# Patient Record
Sex: MALE | Race: BLACK OR AFRICAN AMERICAN | Employment: UNEMPLOYED | URBAN - METROPOLITAN AREA
[De-identification: names, ages, dates, MRNs, and addresses within clinical notes are randomized per-mention and may not be internally consistent; named-entity substitution may affect disease eponyms.]

---

## 2018-01-26 ENCOUNTER — HOSPITAL ENCOUNTER (EMERGENCY)
Age: 38
Discharge: LEFT BEFORE DISCHARGE | End: 2018-01-27
Attending: EMERGENCY MEDICINE
Payer: MEDICAID

## 2018-01-26 VITALS
HEIGHT: 70 IN | SYSTOLIC BLOOD PRESSURE: 180 MMHG | OXYGEN SATURATION: 99 % | HEART RATE: 105 BPM | BODY MASS INDEX: 36.08 KG/M2 | WEIGHT: 252 LBS | DIASTOLIC BLOOD PRESSURE: 120 MMHG | RESPIRATION RATE: 18 BRPM | TEMPERATURE: 98.5 F

## 2018-01-26 PROCEDURE — 75810000275 HC EMERGENCY DEPT VISIT NO LEVEL OF CARE

## 2018-01-27 NOTE — ED NOTES
Patient left without being seen after triage. He alerted the Registrar and left prior to the Charge Nurse being able to speak to him.

## 2018-01-27 NOTE — ED TRIAGE NOTES
Triage: Patient arrives ambulatory from home with c/o headache and neck pain x several weeks. Patient also reporting uncontrolled hypertension for several weeks.

## 2018-11-26 ENCOUNTER — HOSPITAL ENCOUNTER (EMERGENCY)
Age: 38
Discharge: STILL ADMITTED OR ADMINISTRATIVE | End: 2018-11-26
Attending: EMERGENCY MEDICINE
Payer: MEDICAID

## 2018-11-26 PROCEDURE — 75810000275 HC EMERGENCY DEPT VISIT NO LEVEL OF CARE

## 2021-01-02 ENCOUNTER — HOSPITAL ENCOUNTER (OUTPATIENT)
Age: 41
Setting detail: OBSERVATION
Discharge: LEFT BEFORE DISCHARGE | End: 2021-01-02
Attending: STUDENT IN AN ORGANIZED HEALTH CARE EDUCATION/TRAINING PROGRAM | Admitting: INTERNAL MEDICINE
Payer: MEDICAID

## 2021-01-02 ENCOUNTER — APPOINTMENT (OUTPATIENT)
Dept: GENERAL RADIOLOGY | Age: 41
End: 2021-01-02
Attending: STUDENT IN AN ORGANIZED HEALTH CARE EDUCATION/TRAINING PROGRAM
Payer: MEDICAID

## 2021-01-02 VITALS
BODY MASS INDEX: 44.5 KG/M2 | OXYGEN SATURATION: 90 % | TEMPERATURE: 97.9 F | WEIGHT: 310.85 LBS | RESPIRATION RATE: 24 BRPM | DIASTOLIC BLOOD PRESSURE: 110 MMHG | SYSTOLIC BLOOD PRESSURE: 153 MMHG | HEART RATE: 89 BPM | HEIGHT: 70 IN

## 2021-01-02 DIAGNOSIS — I16.0 HYPERTENSIVE URGENCY: ICD-10-CM

## 2021-01-02 DIAGNOSIS — I50.23 ACUTE ON CHRONIC SYSTOLIC CONGESTIVE HEART FAILURE (HCC): ICD-10-CM

## 2021-01-02 DIAGNOSIS — R07.9 ACUTE CHEST PAIN: Primary | ICD-10-CM

## 2021-01-02 DIAGNOSIS — R77.8 ELEVATED TROPONIN: ICD-10-CM

## 2021-01-02 DIAGNOSIS — R06.09 EXERTIONAL DYSPNEA: ICD-10-CM

## 2021-01-02 LAB
ALBUMIN SERPL-MCNC: 2.8 G/DL (ref 3.5–5)
ALBUMIN/GLOB SERPL: 0.6 {RATIO} (ref 1.1–2.2)
ALP SERPL-CCNC: 50 U/L (ref 45–117)
ALT SERPL-CCNC: 23 U/L (ref 12–78)
ANION GAP SERPL CALC-SCNC: 1 MMOL/L (ref 5–15)
AST SERPL-CCNC: 24 U/L (ref 15–37)
BASOPHILS # BLD: 0.1 K/UL (ref 0–0.1)
BASOPHILS NFR BLD: 0 % (ref 0–1)
BILIRUB SERPL-MCNC: 0.5 MG/DL (ref 0.2–1)
BNP SERPL-MCNC: 2855 PG/ML
BUN SERPL-MCNC: 12 MG/DL (ref 6–20)
BUN/CREAT SERPL: 10 (ref 12–20)
CALCIUM SERPL-MCNC: 8.7 MG/DL (ref 8.5–10.1)
CHLORIDE SERPL-SCNC: 106 MMOL/L (ref 97–108)
CO2 SERPL-SCNC: 32 MMOL/L (ref 21–32)
COMMENT, HOLDF: NORMAL
CREAT SERPL-MCNC: 1.16 MG/DL (ref 0.7–1.3)
DIFFERENTIAL METHOD BLD: ABNORMAL
EOSINOPHIL # BLD: 0.1 K/UL (ref 0–0.4)
EOSINOPHIL NFR BLD: 1 % (ref 0–7)
ERYTHROCYTE [DISTWIDTH] IN BLOOD BY AUTOMATED COUNT: 14.5 % (ref 11.5–14.5)
GLOBULIN SER CALC-MCNC: 4.7 G/DL (ref 2–4)
GLUCOSE SERPL-MCNC: 143 MG/DL (ref 65–100)
HCT VFR BLD AUTO: 41.8 % (ref 36.6–50.3)
HGB BLD-MCNC: 12.6 G/DL (ref 12.1–17)
IMM GRANULOCYTES # BLD AUTO: 0 K/UL (ref 0–0.04)
IMM GRANULOCYTES NFR BLD AUTO: 0 % (ref 0–0.5)
LYMPHOCYTES # BLD: 2.7 K/UL (ref 0.8–3.5)
LYMPHOCYTES NFR BLD: 23 % (ref 12–49)
MCH RBC QN AUTO: 27.9 PG (ref 26–34)
MCHC RBC AUTO-ENTMCNC: 30.1 G/DL (ref 30–36.5)
MCV RBC AUTO: 92.5 FL (ref 80–99)
MONOCYTES # BLD: 0.9 K/UL (ref 0–1)
MONOCYTES NFR BLD: 8 % (ref 5–13)
NEUTS SEG # BLD: 7.9 K/UL (ref 1.8–8)
NEUTS SEG NFR BLD: 68 % (ref 32–75)
NRBC # BLD: 0 K/UL (ref 0–0.01)
NRBC BLD-RTO: 0 PER 100 WBC
PLATELET # BLD AUTO: 313 K/UL (ref 150–400)
PMV BLD AUTO: 10.2 FL (ref 8.9–12.9)
POTASSIUM SERPL-SCNC: 4.2 MMOL/L (ref 3.5–5.1)
PROT SERPL-MCNC: 7.5 G/DL (ref 6.4–8.2)
RBC # BLD AUTO: 4.52 M/UL (ref 4.1–5.7)
SAMPLES BEING HELD,HOLD: NORMAL
SODIUM SERPL-SCNC: 139 MMOL/L (ref 136–145)
TROPONIN I SERPL-MCNC: 0.11 NG/ML
WBC # BLD AUTO: 11.7 K/UL (ref 4.1–11.1)

## 2021-01-02 PROCEDURE — 77030012341 HC CHMB SPCR OPTC MDI VYRM -A

## 2021-01-02 PROCEDURE — 94664 DEMO&/EVAL PT USE INHALER: CPT

## 2021-01-02 PROCEDURE — 80053 COMPREHEN METABOLIC PANEL: CPT

## 2021-01-02 PROCEDURE — 85025 COMPLETE CBC W/AUTO DIFF WBC: CPT

## 2021-01-02 PROCEDURE — 65660000000 HC RM CCU STEPDOWN

## 2021-01-02 PROCEDURE — 93005 ELECTROCARDIOGRAM TRACING: CPT

## 2021-01-02 PROCEDURE — 99218 HC RM OBSERVATION: CPT

## 2021-01-02 PROCEDURE — 96374 THER/PROPH/DIAG INJ IV PUSH: CPT

## 2021-01-02 PROCEDURE — 94640 AIRWAY INHALATION TREATMENT: CPT

## 2021-01-02 PROCEDURE — 99285 EMERGENCY DEPT VISIT HI MDM: CPT

## 2021-01-02 PROCEDURE — 84484 ASSAY OF TROPONIN QUANT: CPT

## 2021-01-02 PROCEDURE — 71046 X-RAY EXAM CHEST 2 VIEWS: CPT

## 2021-01-02 PROCEDURE — 74011250636 HC RX REV CODE- 250/636: Performed by: STUDENT IN AN ORGANIZED HEALTH CARE EDUCATION/TRAINING PROGRAM

## 2021-01-02 PROCEDURE — 83880 ASSAY OF NATRIURETIC PEPTIDE: CPT

## 2021-01-02 PROCEDURE — 74011250637 HC RX REV CODE- 250/637: Performed by: STUDENT IN AN ORGANIZED HEALTH CARE EDUCATION/TRAINING PROGRAM

## 2021-01-02 PROCEDURE — 36415 COLL VENOUS BLD VENIPUNCTURE: CPT

## 2021-01-02 PROCEDURE — 96375 TX/PRO/DX INJ NEW DRUG ADDON: CPT

## 2021-01-02 RX ORDER — SODIUM CHLORIDE 0.9 % (FLUSH) 0.9 %
5-40 SYRINGE (ML) INJECTION AS NEEDED
Status: DISCONTINUED | OUTPATIENT
Start: 2021-01-02 | End: 2021-01-02 | Stop reason: HOSPADM

## 2021-01-02 RX ORDER — GUAIFENESIN 100 MG/5ML
324 LIQUID (ML) ORAL
Status: COMPLETED | OUTPATIENT
Start: 2021-01-02 | End: 2021-01-02

## 2021-01-02 RX ORDER — FUROSEMIDE 10 MG/ML
40 INJECTION INTRAMUSCULAR; INTRAVENOUS ONCE
Status: COMPLETED | OUTPATIENT
Start: 2021-01-02 | End: 2021-01-02

## 2021-01-02 RX ORDER — METOLAZONE 5 MG/1
5 TABLET ORAL ONCE
Status: DISCONTINUED | OUTPATIENT
Start: 2021-01-02 | End: 2021-01-02 | Stop reason: HOSPADM

## 2021-01-02 RX ORDER — SPIRONOLACTONE 25 MG/1
25 TABLET ORAL DAILY
Status: DISCONTINUED | OUTPATIENT
Start: 2021-01-03 | End: 2021-01-02 | Stop reason: HOSPADM

## 2021-01-02 RX ORDER — SODIUM CHLORIDE 0.9 % (FLUSH) 0.9 %
5-40 SYRINGE (ML) INJECTION EVERY 8 HOURS
Status: DISCONTINUED | OUTPATIENT
Start: 2021-01-02 | End: 2021-01-02 | Stop reason: HOSPADM

## 2021-01-02 RX ORDER — CARVEDILOL 12.5 MG/1
12.5 TABLET ORAL 2 TIMES DAILY WITH MEALS
Status: DISCONTINUED | OUTPATIENT
Start: 2021-01-02 | End: 2021-01-02 | Stop reason: HOSPADM

## 2021-01-02 RX ORDER — PROMETHAZINE HYDROCHLORIDE 25 MG/1
12.5 TABLET ORAL
Status: DISCONTINUED | OUTPATIENT
Start: 2021-01-02 | End: 2021-01-02 | Stop reason: HOSPADM

## 2021-01-02 RX ORDER — LISINOPRIL 10 MG/1
20 TABLET ORAL DAILY
Status: DISCONTINUED | OUTPATIENT
Start: 2021-01-02 | End: 2021-01-02 | Stop reason: HOSPADM

## 2021-01-02 RX ORDER — ALBUTEROL SULFATE 90 UG/1
4 AEROSOL, METERED RESPIRATORY (INHALATION)
Status: COMPLETED | OUTPATIENT
Start: 2021-01-02 | End: 2021-01-02

## 2021-01-02 RX ORDER — ACETAMINOPHEN 650 MG/1
650 SUPPOSITORY RECTAL
Status: DISCONTINUED | OUTPATIENT
Start: 2021-01-02 | End: 2021-01-02 | Stop reason: HOSPADM

## 2021-01-02 RX ORDER — POLYETHYLENE GLYCOL 3350 17 G/17G
17 POWDER, FOR SOLUTION ORAL DAILY PRN
Status: DISCONTINUED | OUTPATIENT
Start: 2021-01-02 | End: 2021-01-02 | Stop reason: HOSPADM

## 2021-01-02 RX ORDER — HYDRALAZINE HYDROCHLORIDE 20 MG/ML
10 INJECTION INTRAMUSCULAR; INTRAVENOUS ONCE
Status: COMPLETED | OUTPATIENT
Start: 2021-01-02 | End: 2021-01-02

## 2021-01-02 RX ORDER — BUMETANIDE 0.25 MG/ML
2 INJECTION INTRAMUSCULAR; INTRAVENOUS EVERY 12 HOURS
Status: DISCONTINUED | OUTPATIENT
Start: 2021-01-02 | End: 2021-01-02 | Stop reason: HOSPADM

## 2021-01-02 RX ORDER — ONDANSETRON 2 MG/ML
4 INJECTION INTRAMUSCULAR; INTRAVENOUS
Status: DISCONTINUED | OUTPATIENT
Start: 2021-01-02 | End: 2021-01-02 | Stop reason: HOSPADM

## 2021-01-02 RX ORDER — ACETAMINOPHEN 325 MG/1
650 TABLET ORAL
Status: DISCONTINUED | OUTPATIENT
Start: 2021-01-02 | End: 2021-01-02 | Stop reason: HOSPADM

## 2021-01-02 RX ADMIN — ALBUTEROL SULFATE 4 PUFF: 108 AEROSOL, METERED RESPIRATORY (INHALATION) at 17:05

## 2021-01-02 RX ADMIN — FUROSEMIDE 40 MG: 10 INJECTION, SOLUTION INTRAMUSCULAR; INTRAVENOUS at 18:11

## 2021-01-02 RX ADMIN — HYDRALAZINE HYDROCHLORIDE 10 MG: 20 INJECTION INTRAMUSCULAR; INTRAVENOUS at 18:11

## 2021-01-02 RX ADMIN — ASPIRIN 324 MG: 81 TABLET, CHEWABLE ORAL at 17:04

## 2021-01-02 NOTE — CONSULTS
CARDIOLOGY CONSULT                 Assessment:     Assessment:       Active Problems:    Chest pain (1/2/2021)         Plan:    1. Elevated troponin  Will trend  prob related to htn  2.  htn  severe  3. Cardiomyopathy  Non ischemic dilated   4. CHF acute on chronic systolic btnp 1725     Home meds:   torsemide 80 bid,  Coreg 12.5 bid, lisinopril 20 mg daily,   Aldactone 25 mg every day, and asthma meds  Resume with IV bumex 2 bid  5. Asthma with exacerbation        Subjective:    Date of  Admission: 1/2/2021  4:31 PM     Admission type:Emergency    Jose Singh is a 41 y.o. male admitted for Chest pain [R07.9]. Pt merrill 3 yr hx of DCM non ischemic managed at [facility]. He has not been taking  His meds and over the last 2 weeks he has been having progressive dyspnea so now dyspneic at rest with + pnd.   Pt has been havign discussions regarding ICD for primary prevention. No hx of syncope or pal[pirtaions. No fevers.         Patient Active Problem List    Diagnosis Date Noted    Chest pain 01/02/2021      Unknown, Provider  Past Medical History:   Diagnosis Date    Asthma     Gastrointestinal disorder     ulcers    Hypertension       Past Surgical History:   Procedure Laterality Date    HX ORTHOPAEDIC      R hand repair     No Known Allergies   Family History   Problem Relation Age of Onset    Hypertension Mother       Current Facility-Administered Medications   Medication Dose Route Frequency    furosemide (LASIX) injection 40 mg  40 mg IntraVENous ONCE    hydrALAZINE (APRESOLINE) 20 mg/mL injection 10 mg  10 mg IntraVENous ONCE    sodium chloride (NS) flush 5-40 mL  5-40 mL IntraVENous Q8H    sodium chloride (NS) flush 5-40 mL  5-40 mL IntraVENous PRN    acetaminophen (TYLENOL) tablet 650 mg  650 mg Oral Q6H PRN    Or    acetaminophen (TYLENOL) suppository 650 mg  650 mg Rectal Q6H PRN    polyethylene glycol (MIRALAX) packet 17 g  17 g Oral DAILY PRN    promethazine (PHENERGAN) tablet 12.5 mg 12.5 mg Oral Q6H PRN    Or    ondansetron (ZOFRAN) injection 4 mg  4 mg IntraVENous Q6H PRN     Current Outpatient Medications   Medication Sig    albuterol (PROVENTIL HFA, VENTOLIN HFA, PROAIR HFA) 90 mcg/actuation inhaler Take 2 Puffs by inhalation every six (6) hours as needed for Wheezing.  dextromethorphan-guaiFENesin (ROBITUSSIN-DM)  mg/5 mL syrup Take 10 mL by mouth every six (6) hours as needed for Cough. Review of Symptoms:  A comprehensive review of systems was negative. No hemoptysis, hematemesis, epistaxis, melena, hematuria. No fevers,  Rashes, seizures, visual disturbances, difficulty walking, no abdominal pain         Physical Exam    Visit Vitals  BP (!) 161/116 (BP 1 Location: Left arm, BP Patient Position: Sitting)   Pulse 91   Temp 97.9 °F (36.6 °C)   Resp 24   Ht 5' 10\" (1.778 m)   Wt 141 kg (310 lb 13.6 oz)   SpO2 98%   BMI 44.60 kg/m²     Skin warm and dry  PERRLA, EOMI  Oropharynx without exudate. Mallampati 2  Neck supple, thyroid not enlarged  Lungs diffuse ronchi   PMI non displaced. Normal S1/ S2   No Mummurs, click or Rubs  + S3 or S4  Abdomen soft and non tender, No Hepatosplenomegaly  Pulses 2+ throughout,    Neuro:   normal facial grimace,  Moves all extremities.    AAAO  unanxious      Cardiographics    Telemetry:nsr   ECG: normal sinus rhythm, nonspecific ST and T waves changes  Echocardiogram: Not done    Labs:   Recent Results (from the past 24 hour(s))   EKG, 12 LEAD, INITIAL    Collection Time: 01/02/21  3:32 PM   Result Value Ref Range    Ventricular Rate 85 BPM    Atrial Rate 85 BPM    P-R Interval 180 ms    QRS Duration 132 ms    Q-T Interval 410 ms    QTC Calculation (Bezet) 487 ms    Calculated P Axis 49 degrees    Calculated R Axis -59 degrees    Calculated T Axis 123 degrees    Diagnosis       Normal sinus rhythm  Left axis deviation  Left ventricular hypertrophy with QRS widening and repolarization abnormality  No previous ECGs available     SAMPLES BEING HELD    Collection Time: 01/02/21  3:36 PM   Result Value Ref Range    SAMPLES BEING HELD 1RED,1BLU     COMMENT        Add-on orders for these samples will be processed based on acceptable specimen integrity and analyte stability, which may vary by analyte. CBC WITH AUTOMATED DIFF    Collection Time: 01/02/21  3:36 PM   Result Value Ref Range    WBC 11.7 (H) 4.1 - 11.1 K/uL    RBC 4.52 4.10 - 5.70 M/uL    HGB 12.6 12.1 - 17.0 g/dL    HCT 41.8 36.6 - 50.3 %    MCV 92.5 80.0 - 99.0 FL    MCH 27.9 26.0 - 34.0 PG    MCHC 30.1 30.0 - 36.5 g/dL    RDW 14.5 11.5 - 14.5 %    PLATELET 963 535 - 239 K/uL    MPV 10.2 8.9 - 12.9 FL    NRBC 0.0 0  WBC    ABSOLUTE NRBC 0.00 0.00 - 0.01 K/uL    NEUTROPHILS 68 32 - 75 %    LYMPHOCYTES 23 12 - 49 %    MONOCYTES 8 5 - 13 %    EOSINOPHILS 1 0 - 7 %    BASOPHILS 0 0 - 1 %    IMMATURE GRANULOCYTES 0 0.0 - 0.5 %    ABS. NEUTROPHILS 7.9 1.8 - 8.0 K/UL    ABS. LYMPHOCYTES 2.7 0.8 - 3.5 K/UL    ABS. MONOCYTES 0.9 0.0 - 1.0 K/UL    ABS. EOSINOPHILS 0.1 0.0 - 0.4 K/UL    ABS. BASOPHILS 0.1 0.0 - 0.1 K/UL    ABS. IMM. GRANS. 0.0 0.00 - 0.04 K/UL    DF AUTOMATED     METABOLIC PANEL, COMPREHENSIVE    Collection Time: 01/02/21  3:36 PM   Result Value Ref Range    Sodium 139 136 - 145 mmol/L    Potassium 4.2 3.5 - 5.1 mmol/L    Chloride 106 97 - 108 mmol/L    CO2 32 21 - 32 mmol/L    Anion gap 1 (L) 5 - 15 mmol/L    Glucose 143 (H) 65 - 100 mg/dL    BUN 12 6 - 20 MG/DL    Creatinine 1.16 0.70 - 1.30 MG/DL    BUN/Creatinine ratio 10 (L) 12 - 20      GFR est AA >60 >60 ml/min/1.73m2    GFR est non-AA >60 >60 ml/min/1.73m2    Calcium 8.7 8.5 - 10.1 MG/DL    Bilirubin, total 0.5 0.2 - 1.0 MG/DL    ALT (SGPT) 23 12 - 78 U/L    AST (SGOT) 24 15 - 37 U/L    Alk.  phosphatase 50 45 - 117 U/L    Protein, total 7.5 6.4 - 8.2 g/dL    Albumin 2.8 (L) 3.5 - 5.0 g/dL    Globulin 4.7 (H) 2.0 - 4.0 g/dL    A-G Ratio 0.6 (L) 1.1 - 2.2     TROPONIN I    Collection Time: 01/02/21  3:36 PM   Result Value Ref Range    Troponin-I, Qt. 0.11 (H) <0.05 ng/mL   NT-PRO BNP    Collection Time: 01/02/21  3:36 PM   Result Value Ref Range    NT pro-BNP 2,855 (H) <125 PG/ML

## 2021-01-02 NOTE — ED NOTES
Triage Note: Patient is coming in with shortness of breath for the past couple of weeks. Patient is also having chest pain. Patient states has been running low on medicine and is having issue getting medicine from pharmacy. Patient's cardiologist is at [facility].

## 2021-01-02 NOTE — Clinical Note
Status[de-identified] INPATIENT [101]   Type of Bed: Telemetry [19]   Inpatient Hospitalization Certified Necessary for the Following Reasons: 3.  Patient receiving treatment that can only be provided in an inpatient setting (further clarification in H&P documentation)   Admitting Diagnosis: Chest pain [[id number]]   Admitting Physician: Abigail Velasquez   Attending Physician: Abigail Velasquez   Estimated Length of Stay: 3-4 Midnights   Discharge Plan[de-identified] Home with Office Follow-up

## 2021-01-02 NOTE — ED PROVIDER NOTES
Chief Complaint   Patient presents with    Chest Pain    Shortness of Breath     This is a 68-year-old male with hypertension, congestive heart failure (last ejection fracture unknown as no echocardiogram results are available in our EMR), asthma, presenting with right-sided chest pain and exertional dyspnea over the last week and a half. He also reports orthopnea and has had a 20 pound weight gain in the last several months. He ran out of his medications sometime last week and asked his pharmacist to call his cardiologist to provide him with a refill but has not yet heard back. He receives most of his cardiac care through [facility]. Denies any fevers, cough, abdominal pain, vomiting, or recent respiratory illness. No recent sick contacts that have been positive for or under investigation for COVID-19 infection. Has chronic lower extremity edema which he does not feel is acutely worse today. No other systemic complaints.         Past Medical History:   Diagnosis Date    Asthma     Gastrointestinal disorder     ulcers    Hypertension        Past Surgical History:   Procedure Laterality Date    HX ORTHOPAEDIC      R hand repair         Family History:   Problem Relation Age of Onset    Hypertension Mother        Social History     Socioeconomic History    Marital status: SINGLE     Spouse name: Not on file    Number of children: Not on file    Years of education: Not on file    Highest education level: Not on file   Occupational History    Not on file   Social Needs    Financial resource strain: Not on file    Food insecurity     Worry: Not on file     Inability: Not on file    Transportation needs     Medical: Not on file     Non-medical: Not on file   Tobacco Use    Smoking status: Current Every Day Smoker     Packs/day: 0.25   Substance and Sexual Activity    Alcohol use: No    Drug use: No    Sexual activity: Not on file   Lifestyle    Physical activity     Days per week: Not on file Minutes per session: Not on file    Stress: Not on file   Relationships    Social connections     Talks on phone: Not on file     Gets together: Not on file     Attends Taoist service: Not on file     Active member of club or organization: Not on file     Attends meetings of clubs or organizations: Not on file     Relationship status: Not on file    Intimate partner violence     Fear of current or ex partner: Not on file     Emotionally abused: Not on file     Physically abused: Not on file     Forced sexual activity: Not on file   Other Topics Concern    Not on file   Social History Narrative    Not on file         ALLERGIES: Patient has no known allergies. Review of Systems   Constitutional: Negative for fever. HENT: Negative for congestion. Eyes: Negative for redness. Respiratory: Positive for shortness of breath. Cardiovascular: Positive for chest pain. Gastrointestinal: Negative for abdominal pain. Genitourinary: Negative for difficulty urinating. Musculoskeletal: Negative for back pain. Neurological: Negative for headaches. Psychiatric/Behavioral: Negative for confusion.        Vitals:    01/02/21 1518 01/02/21 1706   BP: (!) 161/116    Pulse: 91    Resp: 24    Temp: 97.9 °F (36.6 °C)    SpO2: 95% 98%   Weight: 141 kg (310 lb 13.6 oz)    Height: 5' 10\" (1.778 m)             Physical Exam  General:  Awake and alert, NAD  HEENT:  NC/AT, equal pupils, moist mucous membranes  Neck:   Normal inspection, full range of motion  Cardiac:  RRR, no murmurs  Respiratory:  +Coarse breath sounds throughout, no wheezing, normal effort  Abdomen:  Soft and nontender, nondistended  Extremities: Warm and well perfused, mild to moderate pitting edema in the lower extremities, symmetric  Neuro:  Moving all extremities symmetrically without gross motor deficit  Skin:   No rashes or pallor    RESULTS  Recent Results (from the past 12 hour(s))   EKG, 12 LEAD, INITIAL    Collection Time: 01/02/21  3:32 PM Result Value Ref Range    Ventricular Rate 85 BPM    Atrial Rate 85 BPM    P-R Interval 180 ms    QRS Duration 132 ms    Q-T Interval 410 ms    QTC Calculation (Bezet) 487 ms    Calculated P Axis 49 degrees    Calculated R Axis -59 degrees    Calculated T Axis 123 degrees    Diagnosis       Normal sinus rhythm  Left axis deviation  Left ventricular hypertrophy with QRS widening and repolarization abnormality  No previous ECGs available     SAMPLES BEING HELD    Collection Time: 01/02/21  3:36 PM   Result Value Ref Range    SAMPLES BEING HELD 1RED,1BLU     COMMENT        Add-on orders for these samples will be processed based on acceptable specimen integrity and analyte stability, which may vary by analyte. CBC WITH AUTOMATED DIFF    Collection Time: 01/02/21  3:36 PM   Result Value Ref Range    WBC 11.7 (H) 4.1 - 11.1 K/uL    RBC 4.52 4.10 - 5.70 M/uL    HGB 12.6 12.1 - 17.0 g/dL    HCT 41.8 36.6 - 50.3 %    MCV 92.5 80.0 - 99.0 FL    MCH 27.9 26.0 - 34.0 PG    MCHC 30.1 30.0 - 36.5 g/dL    RDW 14.5 11.5 - 14.5 %    PLATELET 625 806 - 695 K/uL    MPV 10.2 8.9 - 12.9 FL    NRBC 0.0 0  WBC    ABSOLUTE NRBC 0.00 0.00 - 0.01 K/uL    NEUTROPHILS 68 32 - 75 %    LYMPHOCYTES 23 12 - 49 %    MONOCYTES 8 5 - 13 %    EOSINOPHILS 1 0 - 7 %    BASOPHILS 0 0 - 1 %    IMMATURE GRANULOCYTES 0 0.0 - 0.5 %    ABS. NEUTROPHILS 7.9 1.8 - 8.0 K/UL    ABS. LYMPHOCYTES 2.7 0.8 - 3.5 K/UL    ABS. MONOCYTES 0.9 0.0 - 1.0 K/UL    ABS. EOSINOPHILS 0.1 0.0 - 0.4 K/UL    ABS. BASOPHILS 0.1 0.0 - 0.1 K/UL    ABS. IMM.  GRANS. 0.0 0.00 - 0.04 K/UL    DF AUTOMATED     METABOLIC PANEL, COMPREHENSIVE    Collection Time: 01/02/21  3:36 PM   Result Value Ref Range    Sodium 139 136 - 145 mmol/L    Potassium 4.2 3.5 - 5.1 mmol/L    Chloride 106 97 - 108 mmol/L    CO2 32 21 - 32 mmol/L    Anion gap 1 (L) 5 - 15 mmol/L    Glucose 143 (H) 65 - 100 mg/dL    BUN 12 6 - 20 MG/DL    Creatinine 1.16 0.70 - 1.30 MG/DL    BUN/Creatinine ratio 10 (L) 12 - 20      GFR est AA >60 >60 ml/min/1.73m2    GFR est non-AA >60 >60 ml/min/1.73m2    Calcium 8.7 8.5 - 10.1 MG/DL    Bilirubin, total 0.5 0.2 - 1.0 MG/DL    ALT (SGPT) 23 12 - 78 U/L    AST (SGOT) 24 15 - 37 U/L    Alk. phosphatase 50 45 - 117 U/L    Protein, total 7.5 6.4 - 8.2 g/dL    Albumin 2.8 (L) 3.5 - 5.0 g/dL    Globulin 4.7 (H) 2.0 - 4.0 g/dL    A-G Ratio 0.6 (L) 1.1 - 2.2     TROPONIN I    Collection Time: 01/02/21  3:36 PM   Result Value Ref Range    Troponin-I, Qt. 0.11 (H) <0.05 ng/mL   NT-PRO BNP    Collection Time: 01/02/21  3:36 PM   Result Value Ref Range    NT pro-BNP 2,855 (H) <125 PG/ML        IMAGING  Xr Chest Pa Lat    Result Date: 1/2/2021  IMPRESSION: There is moderately severe cardiomegaly and a pericardial effusion should be excluded. The lungs are clear of a focal airspace process. Procedures - none unless documented below  EKG as interpreted by me:  Normal sinus rhythm at a rate of 85, left axis deviation, nonspecific intraventricular conduction delay with ST elevations in leads V3 and V4 although they do not appear to be of ischemic morphology. Left ventricular hypertrophy by aVL criteria. There are new T wave inversions in leads V5V6, 1 and aVL. These changes are new compared to his last EKG from 2014. ED course: Labs, EKG and imaging reviewed. Hx CHF unknown EF, ran out of his medications 1-2 weeks ago. Hypertensive with exertional dyspnea, orthopnea, troponin 0.11 without basis for comparison and elevated BNP in the 2000's. Chest film clear. EKG abnormal but no STEMI. Discussed with cardiology Chance Mayorga) who agrees this is likely CHF driven, he agreed to evaluate at the bedside. I've ordered aspirin, IV hydralazine and Lasix 40 mg, he was given albuterol as well, he does not known what he takes for his blood pressure. When I reexamined the patient, he was very unhappy that he did not receive an albuterol neb treatment.   I informed him that this was not safe in the setting of the current COVID pandemic especially without the results of a COVID-19 test, and offered to give him additional albuterol MDI. He declined the test itself, felt that he was waiting too long to receive medical care, did not want to wait any further, and wished to seek care at a different hospitalist.  He was evaluated by the hospitalist and decided to leave AMA. The patient wishes to leave against medical advice as he does not wish to be hospitalized, understanding that this is necessary for further evaluation and treatment, given the concern for potential life-threatening illness. At this time the patient is alert and oriented with full decision making capacity, verbalized understanding of risks of leaving AMA including heart attack, respiratory failure, as well as other undiagnosed medical pathology that could result in severe disability or death. He refused to sign a copy of the AMA form. His IV was removed by nursing and he left shortly thereafter. Hospitalist King Serve for Admission  5:29 PM    ED Room Number:   ER16/16  Patient Name and age:  Alysa George 41 y.o.  male  Working Diagnosis:     1. Acute chest pain    2. Exertional dyspnea    3. Acute on chronic systolic congestive heart failure (HCC)    4. Elevated troponin    5. Hypertensive urgency      COVID suspicion:   Other(low suspicion but will defer to you on testing)  Code Status:    Full Code  Readmission:    no  Isolation Requirements:  Other  Recommended Level of Care: telemetry  Department:    [facility] ED - 21   Other:     Hx CHF unknown EF, ran out of his medications 1-2 weeks ago. Hypertensive with exertional dyspnea, orthopnea, troponin 0.11 without basis for comparison and elevated BNP in the 2000's. Chest film clear. EKG abnormal but no STEMI. Discussed with cardiology Adriana Tay) who agrees this is likely CHF driven, he will evaluate at the bedside.   I've ordered aspirin, IV hydralazine and Lasix 40 mg, he does not known what he takes for his blood pressure. no indicators present

## 2021-01-02 NOTE — ED NOTES
Patient signed out [place] after being seen by the Hospitalist and Cardiologist. Patient signed out AMA by Dr. Dolores Rodríguez

## 2021-01-03 LAB
ATRIAL RATE: 85 BPM
CALCULATED P AXIS, ECG09: 49 DEGREES
CALCULATED R AXIS, ECG10: -59 DEGREES
CALCULATED T AXIS, ECG11: 123 DEGREES
DIAGNOSIS, 93000: NORMAL
P-R INTERVAL, ECG05: 180 MS
Q-T INTERVAL, ECG07: 410 MS
QRS DURATION, ECG06: 132 MS
QTC CALCULATION (BEZET), ECG08: 487 MS
VENTRICULAR RATE, ECG03: 85 BPM

## 2021-01-03 NOTE — PROGRESS NOTES
Hospitalist consulted for admission. During brief encounter, patient elected to leave AMA. No history or physical exam completed. Signed out AMA with emergency room provider.

## 2021-01-20 NOTE — ADT AUTH CERT NOTES
PREVIOUSLY DENIED FOR INPATIENT DOWNGRADED TO OBSERVATION REF #[id number]    
  
PLEASE FAX FORM OR CALL BACK TO NOTIFY IF  AUTHORIZATION FOR OBSERVATION IS OR IS NOT REQUIRED  PHONE # [phone]  FAX # [phone]

## 2022-03-20 PROBLEM — R07.9 CHEST PAIN: Status: ACTIVE | Noted: 2021-01-02

## 2022-06-28 ENCOUNTER — HOSPITAL ENCOUNTER (EMERGENCY)
Age: 42
Discharge: HOME | End: 2022-06-28
Attending: STUDENT IN AN ORGANIZED HEALTH CARE EDUCATION/TRAINING PROGRAM
Payer: MEDICAID

## 2022-06-28 ENCOUNTER — APPOINTMENT (OUTPATIENT)
Dept: ULTRASOUND IMAGING | Age: 42
End: 2022-06-28
Attending: STUDENT IN AN ORGANIZED HEALTH CARE EDUCATION/TRAINING PROGRAM
Payer: MEDICAID

## 2022-06-28 VITALS
SYSTOLIC BLOOD PRESSURE: 156 MMHG | WEIGHT: 315 LBS | TEMPERATURE: 97.9 F | RESPIRATION RATE: 20 BRPM | HEART RATE: 86 BPM | DIASTOLIC BLOOD PRESSURE: 95 MMHG | OXYGEN SATURATION: 91 % | BODY MASS INDEX: 49.76 KG/M2

## 2022-06-28 DIAGNOSIS — N43.3 HYDROCELE IN ADULT: Primary | ICD-10-CM

## 2022-06-28 LAB
ALBUMIN SERPL-MCNC: 2.8 G/DL (ref 3.5–5)
ALBUMIN/GLOB SERPL: 0.5 {RATIO} (ref 1.1–2.2)
ALP SERPL-CCNC: 56 U/L (ref 45–117)
ALT SERPL-CCNC: 15 U/L (ref 12–78)
ANION GAP SERPL CALC-SCNC: 3 MMOL/L (ref 5–15)
AST SERPL-CCNC: 12 U/L (ref 15–37)
ATRIAL RATE: 90 BPM
BASOPHILS # BLD: 0 K/UL (ref 0–0.1)
BASOPHILS NFR BLD: 0 % (ref 0–1)
BILIRUB SERPL-MCNC: 0.3 MG/DL (ref 0.2–1)
BNP SERPL-MCNC: 1738 PG/ML
BUN SERPL-MCNC: 14 MG/DL (ref 6–20)
BUN/CREAT SERPL: 13 (ref 12–20)
CALCIUM SERPL-MCNC: 8.3 MG/DL (ref 8.5–10.1)
CALCULATED P AXIS, ECG09: 70 DEGREES
CALCULATED R AXIS, ECG10: -60 DEGREES
CALCULATED T AXIS, ECG11: 109 DEGREES
CHLORIDE SERPL-SCNC: 100 MMOL/L (ref 97–108)
CO2 SERPL-SCNC: 35 MMOL/L (ref 21–32)
COMMENT, HOLDF: NORMAL
CREAT SERPL-MCNC: 1.09 MG/DL (ref 0.7–1.3)
DIAGNOSIS, 93000: NORMAL
DIFFERENTIAL METHOD BLD: ABNORMAL
EOSINOPHIL # BLD: 0.1 K/UL (ref 0–0.4)
EOSINOPHIL NFR BLD: 1 % (ref 0–7)
ERYTHROCYTE [DISTWIDTH] IN BLOOD BY AUTOMATED COUNT: 16.6 % (ref 11.5–14.5)
GLOBULIN SER CALC-MCNC: 5.8 G/DL (ref 2–4)
GLUCOSE SERPL-MCNC: 111 MG/DL (ref 65–100)
HCT VFR BLD AUTO: 39 % (ref 36.6–50.3)
HGB BLD-MCNC: 11.3 G/DL (ref 12.1–17)
IMM GRANULOCYTES # BLD AUTO: 0 K/UL (ref 0–0.04)
IMM GRANULOCYTES NFR BLD AUTO: 0 % (ref 0–0.5)
LYMPHOCYTES # BLD: 2.1 K/UL (ref 0.8–3.5)
LYMPHOCYTES NFR BLD: 19 % (ref 12–49)
MCH RBC QN AUTO: 24.4 PG (ref 26–34)
MCHC RBC AUTO-ENTMCNC: 29 G/DL (ref 30–36.5)
MCV RBC AUTO: 84.2 FL (ref 80–99)
MONOCYTES # BLD: 0.9 K/UL (ref 0–1)
MONOCYTES NFR BLD: 8 % (ref 5–13)
NEUTS SEG # BLD: 8.2 K/UL (ref 1.8–8)
NEUTS SEG NFR BLD: 72 % (ref 32–75)
NRBC # BLD: 0 K/UL (ref 0–0.01)
NRBC BLD-RTO: 0 PER 100 WBC
P-R INTERVAL, ECG05: 198 MS
PLATELET # BLD AUTO: 333 K/UL (ref 150–400)
POTASSIUM SERPL-SCNC: 3.3 MMOL/L (ref 3.5–5.1)
PROT SERPL-MCNC: 8.6 G/DL (ref 6.4–8.2)
Q-T INTERVAL, ECG07: 404 MS
QRS DURATION, ECG06: 130 MS
QTC CALCULATION (BEZET), ECG08: 494 MS
RBC # BLD AUTO: 4.63 M/UL (ref 4.1–5.7)
RBC MORPH BLD: ABNORMAL
RBC MORPH BLD: ABNORMAL
SAMPLES BEING HELD,HOLD: NORMAL
SODIUM SERPL-SCNC: 138 MMOL/L (ref 136–145)
TROPONIN-HIGH SENSITIVITY: 72 NG/L (ref 0–76)
VENTRICULAR RATE, ECG03: 90 BPM
WBC # BLD AUTO: 11.3 K/UL (ref 4.1–11.1)

## 2022-06-28 PROCEDURE — 80053 COMPREHEN METABOLIC PANEL: CPT

## 2022-06-28 PROCEDURE — 93005 ELECTROCARDIOGRAM TRACING: CPT

## 2022-06-28 PROCEDURE — 85025 COMPLETE CBC W/AUTO DIFF WBC: CPT

## 2022-06-28 PROCEDURE — 83880 ASSAY OF NATRIURETIC PEPTIDE: CPT

## 2022-06-28 PROCEDURE — 84484 ASSAY OF TROPONIN QUANT: CPT

## 2022-06-28 PROCEDURE — 99284 EMERGENCY DEPT VISIT MOD MDM: CPT

## 2022-06-28 PROCEDURE — 36415 COLL VENOUS BLD VENIPUNCTURE: CPT

## 2022-06-28 PROCEDURE — 76870 US EXAM SCROTUM: CPT

## 2022-06-28 RX ORDER — CARVEDILOL 3.12 MG/1
3.12 TABLET ORAL 2 TIMES DAILY
COMMUNITY
Start: 2022-04-25

## 2022-06-28 RX ORDER — DAPAGLIFLOZIN 5 MG/1
5 TABLET, FILM COATED ORAL DAILY
COMMUNITY
Start: 2022-06-18

## 2022-06-28 RX ORDER — SPIRONOLACTONE 25 MG/1
25 TABLET ORAL 2 TIMES DAILY
Status: ON HOLD | COMMUNITY
Start: 2022-06-18 | End: 2022-11-03 | Stop reason: SDUPTHER

## 2022-06-28 RX ORDER — SACUBITRIL AND VALSARTAN 49; 51 MG/1; MG/1
TABLET, FILM COATED ORAL
COMMUNITY
Start: 2022-06-20

## 2022-06-28 RX ORDER — TORSEMIDE 20 MG/1
TABLET ORAL
COMMUNITY
Start: 2022-06-24

## 2022-06-28 RX ORDER — LISINOPRIL 20 MG/1
20 TABLET ORAL DAILY
COMMUNITY
Start: 2022-05-19 | End: 2022-10-25

## 2022-06-28 NOTE — ED TRIAGE NOTES
Patient in through triage with complaints of SOB and bilateral leg edema with notable swelling of his scrotum. Patient reports that he takes 200mg of Lasix daily but is still noticing increased swelling all over his body. Patient states that he has an apt with his cardiologist on Friday.

## 2022-06-28 NOTE — ED PROVIDER NOTES
Patient is a 20-year-old male present emergency department with scrotal edema, swelling. Patient states that he has a history of CHF is on Lasix daily recently been wearing his compression socks to help with edema noticed that his upper thighs are more edematous but patient's more concerned about the scrotal swelling. Patient says he noticed a bump on his scrotum recently thought it was a blackhead and tried to pop it the following day he was concerned because he was developing scrotal swelling. Patient denies any fevers, dysuria nausea or vomiting. Patient states that his shortness of breath secondary to CHF has actually improved recently he has a appointment with his cardiologist next Friday. Patient denies any tenderness to the scrotum but he has noticed that the scrotum is extremely swollen.            Past Medical History:   Diagnosis Date    Asthma     Gastrointestinal disorder     ulcers    Hypertension        Past Surgical History:   Procedure Laterality Date    HX ORTHOPAEDIC      R hand repair         Family History:   Problem Relation Age of Onset    Hypertension Mother        Social History     Socioeconomic History    Marital status: SINGLE     Spouse name: Not on file    Number of children: Not on file    Years of education: Not on file    Highest education level: Not on file   Occupational History    Not on file   Tobacco Use    Smoking status: Current Every Day Smoker     Packs/day: 0.25    Smokeless tobacco: Not on file   Substance and Sexual Activity    Alcohol use: No    Drug use: No    Sexual activity: Not on file   Other Topics Concern    Not on file   Social History Narrative    Not on file     Social Determinants of Health     Financial Resource Strain:     Difficulty of Paying Living Expenses: Not on file   Food Insecurity:     Worried About Running Out of Food in the Last Year: Not on file    Renato of Food in the Last Year: Not on file   Transportation Needs:     Lack of Transportation (Medical): Not on file    Lack of Transportation (Non-Medical): Not on file   Physical Activity:     Days of Exercise per Week: Not on file    Minutes of Exercise per Session: Not on file   Stress:     Feeling of Stress : Not on file   Social Connections:     Frequency of Communication with Friends and Family: Not on file    Frequency of Social Gatherings with Friends and Family: Not on file    Attends Yazdanism Services: Not on file    Active Member of [address] [place] [address] or Organizations: Not on file    Attends Club or Organization Meetings: Not on file    Marital Status: Not on file   Intimate Partner Violence:     Fear of Current or Ex-Partner: Not on file    Emotionally Abused: Not on file    Physically Abused: Not on file    Sexually Abused: Not on file   Housing Stability:     Unable to Pay for Housing in the Last Year: Not on file    Number of [place] in the Last Year: Not on file    Unstable Housing in the Last Year: Not on file         ALLERGIES: Patient has no known allergies. Review of Systems   Cardiovascular: Positive for leg swelling. Genitourinary: Positive for penile discharge, penile swelling and scrotal swelling. Negative for difficulty urinating, dysuria, flank pain, frequency, penile pain and testicular pain. All other systems reviewed and are negative. Vitals:    06/28/22 0050   BP: 120/85   Pulse: 93   Resp: 20   Temp: 97.9 °F (36.6 °C)   SpO2: 93%   Weight: 157.3 kg (346 lb 12.5 oz)            Physical Exam  Vitals and nursing note reviewed. Constitutional:       Appearance: Normal appearance. He is morbidly obese. HENT:      Head: Normocephalic and atraumatic. Eyes:      Extraocular Movements: Extraocular movements intact. Pupils: Pupils are equal, round, and reactive to light. Cardiovascular:      Rate and Rhythm: Normal rate and regular rhythm. Abdominal:      General: Abdomen is protuberant.    Genitourinary:     Penis: Normal. Testes:         Right: Swelling and testicular hydrocele present. Left: Swelling and testicular hydrocele present. Comments: Scrotum severely swollen boggy fluid-filled. Nontender nonerythematous no evidence of skin breakdown or subcutaneous collection. Musculoskeletal:      Cervical back: Normal range of motion and neck supple. Right lower le+ Edema present. Left lower le+ Edema present. Skin:     General: Skin is warm and dry. Neurological:      General: No focal deficit present. Mental Status: He is alert and oriented to person, place, and time. Psychiatric:         Mood and Affect: Mood is anxious. MDM  Number of Diagnoses or Management Options  Diagnosis management comments: Scrotal edema, cellulitis. 26-year-old male present emergency department with severe scrotal swelling likely secondary to CHF, peripheral edema. Patient recently started using compression socks leaving them on at night with increased swelling to his upper thighs likely communicating now to the scrotum. Low suspicion for cellulitis versus foreign years gangrene or infectious causes. Will obtain labs including cardiac markers, proBNP, scrotal ultrasound. Procedures      EKG shows a normal sinus rhythm with a rate of 90, left axis deviation no ST or T wave abnormalities to suggest ischemia or infarct.

## 2022-10-25 ENCOUNTER — HOSPITAL ENCOUNTER (INPATIENT)
Age: 42
LOS: 9 days | Discharge: HOME | DRG: 194 | End: 2022-11-03
Attending: STUDENT IN AN ORGANIZED HEALTH CARE EDUCATION/TRAINING PROGRAM | Admitting: INTERNAL MEDICINE
Payer: MEDICAID

## 2022-10-25 ENCOUNTER — APPOINTMENT (OUTPATIENT)
Dept: GENERAL RADIOLOGY | Age: 42
DRG: 194 | End: 2022-10-25
Attending: HOSPITALIST
Payer: MEDICAID

## 2022-10-25 ENCOUNTER — APPOINTMENT (OUTPATIENT)
Dept: GENERAL RADIOLOGY | Age: 42
DRG: 194 | End: 2022-10-25
Attending: STUDENT IN AN ORGANIZED HEALTH CARE EDUCATION/TRAINING PROGRAM
Payer: MEDICAID

## 2022-10-25 ENCOUNTER — APPOINTMENT (OUTPATIENT)
Dept: VASCULAR SURGERY | Age: 42
DRG: 194 | End: 2022-10-25
Attending: INTERNAL MEDICINE
Payer: MEDICAID

## 2022-10-25 DIAGNOSIS — E87.70 HYPERVOLEMIA DUE TO CONGESTIVE HEART FAILURE (HCC): Primary | ICD-10-CM

## 2022-10-25 DIAGNOSIS — R06.09 DOE (DYSPNEA ON EXERTION): ICD-10-CM

## 2022-10-25 DIAGNOSIS — I50.9 ACUTE ON CHRONIC CONGESTIVE HEART FAILURE, UNSPECIFIED HEART FAILURE TYPE (HCC): ICD-10-CM

## 2022-10-25 DIAGNOSIS — I50.9 HYPERVOLEMIA DUE TO CONGESTIVE HEART FAILURE (HCC): Primary | ICD-10-CM

## 2022-10-25 LAB
ALBUMIN SERPL-MCNC: 2.8 G/DL (ref 3.5–5)
ALBUMIN/GLOB SERPL: 0.4 {RATIO} (ref 1.1–2.2)
ALP SERPL-CCNC: 53 U/L (ref 45–117)
ALT SERPL-CCNC: 15 U/L (ref 12–78)
ANION GAP SERPL CALC-SCNC: 9 MMOL/L (ref 5–15)
AST SERPL-CCNC: 20 U/L (ref 15–37)
ATRIAL RATE: 102 BPM
BASOPHILS # BLD: 0 K/UL (ref 0–0.1)
BASOPHILS NFR BLD: 0 % (ref 0–1)
BILIRUB SERPL-MCNC: 1 MG/DL (ref 0.2–1)
BNP SERPL-MCNC: 4293 PG/ML
BUN SERPL-MCNC: 11 MG/DL (ref 6–20)
BUN/CREAT SERPL: 8 (ref 12–20)
CALCIUM SERPL-MCNC: 8.3 MG/DL (ref 8.5–10.1)
CALCULATED P AXIS, ECG09: 71 DEGREES
CALCULATED R AXIS, ECG10: -108 DEGREES
CALCULATED T AXIS, ECG11: 75 DEGREES
CHLORIDE SERPL-SCNC: 93 MMOL/L (ref 97–108)
CO2 SERPL-SCNC: 31 MMOL/L (ref 21–32)
COMMENT, HOLDF: NORMAL
CREAT SERPL-MCNC: 1.43 MG/DL (ref 0.7–1.3)
D DIMER PPP FEU-MCNC: 0.93 MG/L FEU (ref 0–0.65)
DIAGNOSIS, 93000: NORMAL
DIFFERENTIAL METHOD BLD: ABNORMAL
EOSINOPHIL # BLD: 0 K/UL (ref 0–0.4)
EOSINOPHIL NFR BLD: 0 % (ref 0–7)
ERYTHROCYTE [DISTWIDTH] IN BLOOD BY AUTOMATED COUNT: 18.5 % (ref 11.5–14.5)
GLOBULIN SER CALC-MCNC: 6.8 G/DL (ref 2–4)
GLUCOSE SERPL-MCNC: 118 MG/DL (ref 65–100)
HCT VFR BLD AUTO: 37.1 % (ref 36.6–50.3)
HGB BLD-MCNC: 10.3 G/DL (ref 12.1–17)
IMM GRANULOCYTES # BLD AUTO: 0 K/UL (ref 0–0.04)
IMM GRANULOCYTES NFR BLD AUTO: 0 % (ref 0–0.5)
LYMPHOCYTES # BLD: 0.9 K/UL (ref 0.8–3.5)
LYMPHOCYTES NFR BLD: 10 % (ref 12–49)
MCH RBC QN AUTO: 22 PG (ref 26–34)
MCHC RBC AUTO-ENTMCNC: 27.8 G/DL (ref 30–36.5)
MCV RBC AUTO: 79.3 FL (ref 80–99)
MONOCYTES # BLD: 1.4 K/UL (ref 0–1)
MONOCYTES NFR BLD: 16 % (ref 5–13)
NEUTS SEG # BLD: 6.7 K/UL (ref 1.8–8)
NEUTS SEG NFR BLD: 74 % (ref 32–75)
NRBC # BLD: 0 K/UL (ref 0–0.01)
NRBC BLD-RTO: 0 PER 100 WBC
P-R INTERVAL, ECG05: 194 MS
PLATELET # BLD AUTO: 316 K/UL (ref 150–400)
PMV BLD AUTO: 9.3 FL (ref 8.9–12.9)
POTASSIUM SERPL-SCNC: 3.5 MMOL/L (ref 3.5–5.1)
PROT SERPL-MCNC: 9.6 G/DL (ref 6.4–8.2)
Q-T INTERVAL, ECG07: 390 MS
QRS DURATION, ECG06: 132 MS
QTC CALCULATION (BEZET), ECG08: 508 MS
RBC # BLD AUTO: 4.68 M/UL (ref 4.1–5.7)
RBC MORPH BLD: ABNORMAL
SAMPLES BEING HELD,HOLD: NORMAL
SODIUM SERPL-SCNC: 133 MMOL/L (ref 136–145)
TROPONIN-HIGH SENSITIVITY: 135 NG/L (ref 0–76)
VENTRICULAR RATE, ECG03: 102 BPM
WBC # BLD AUTO: 9 K/UL (ref 4.1–11.1)

## 2022-10-25 PROCEDURE — 94640 AIRWAY INHALATION TREATMENT: CPT

## 2022-10-25 PROCEDURE — 74011000250 HC RX REV CODE- 250: Performed by: STUDENT IN AN ORGANIZED HEALTH CARE EDUCATION/TRAINING PROGRAM

## 2022-10-25 PROCEDURE — 93970 EXTREMITY STUDY: CPT

## 2022-10-25 PROCEDURE — 85025 COMPLETE CBC W/AUTO DIFF WBC: CPT

## 2022-10-25 PROCEDURE — 74011000250 HC RX REV CODE- 250: Performed by: INTERNAL MEDICINE

## 2022-10-25 PROCEDURE — 74011250636 HC RX REV CODE- 250/636: Performed by: INTERNAL MEDICINE

## 2022-10-25 PROCEDURE — 93005 ELECTROCARDIOGRAM TRACING: CPT

## 2022-10-25 PROCEDURE — 36415 COLL VENOUS BLD VENIPUNCTURE: CPT

## 2022-10-25 PROCEDURE — 74011250637 HC RX REV CODE- 250/637: Performed by: INTERNAL MEDICINE

## 2022-10-25 PROCEDURE — 85379 FIBRIN DEGRADATION QUANT: CPT

## 2022-10-25 PROCEDURE — 96374 THER/PROPH/DIAG INJ IV PUSH: CPT

## 2022-10-25 PROCEDURE — 65270000046 HC RM TELEMETRY

## 2022-10-25 PROCEDURE — 71045 X-RAY EXAM CHEST 1 VIEW: CPT

## 2022-10-25 PROCEDURE — 80053 COMPREHEN METABOLIC PANEL: CPT

## 2022-10-25 PROCEDURE — 74011250636 HC RX REV CODE- 250/636: Performed by: STUDENT IN AN ORGANIZED HEALTH CARE EDUCATION/TRAINING PROGRAM

## 2022-10-25 PROCEDURE — 99285 EMERGENCY DEPT VISIT HI MDM: CPT

## 2022-10-25 PROCEDURE — 83880 ASSAY OF NATRIURETIC PEPTIDE: CPT

## 2022-10-25 PROCEDURE — 71046 X-RAY EXAM CHEST 2 VIEWS: CPT

## 2022-10-25 PROCEDURE — 84484 ASSAY OF TROPONIN QUANT: CPT

## 2022-10-25 RX ORDER — ONDANSETRON 4 MG/1
4 TABLET, ORALLY DISINTEGRATING ORAL
Status: DISCONTINUED | OUTPATIENT
Start: 2022-10-25 | End: 2022-11-03 | Stop reason: HOSPADM

## 2022-10-25 RX ORDER — FUROSEMIDE 10 MG/ML
80 INJECTION INTRAMUSCULAR; INTRAVENOUS ONCE
Status: COMPLETED | OUTPATIENT
Start: 2022-10-25 | End: 2022-10-25

## 2022-10-25 RX ORDER — ONDANSETRON 2 MG/ML
4 INJECTION INTRAMUSCULAR; INTRAVENOUS
Status: DISCONTINUED | OUTPATIENT
Start: 2022-10-25 | End: 2022-11-03 | Stop reason: HOSPADM

## 2022-10-25 RX ORDER — POLYETHYLENE GLYCOL 3350 17 G/17G
17 POWDER, FOR SOLUTION ORAL DAILY PRN
Status: DISCONTINUED | OUTPATIENT
Start: 2022-10-25 | End: 2022-11-03 | Stop reason: HOSPADM

## 2022-10-25 RX ORDER — CARVEDILOL 3.12 MG/1
3.12 TABLET ORAL 2 TIMES DAILY
Status: DISCONTINUED | OUTPATIENT
Start: 2022-10-25 | End: 2022-11-03 | Stop reason: HOSPADM

## 2022-10-25 RX ORDER — ENOXAPARIN SODIUM 100 MG/ML
40 INJECTION SUBCUTANEOUS DAILY
Status: DISCONTINUED | OUTPATIENT
Start: 2022-10-26 | End: 2022-10-26

## 2022-10-25 RX ORDER — IPRATROPIUM BROMIDE AND ALBUTEROL SULFATE 2.5; .5 MG/3ML; MG/3ML
3 SOLUTION RESPIRATORY (INHALATION)
Status: DISCONTINUED | OUTPATIENT
Start: 2022-10-25 | End: 2022-11-03 | Stop reason: HOSPADM

## 2022-10-25 RX ORDER — SODIUM CHLORIDE 0.9 % (FLUSH) 0.9 %
5-40 SYRINGE (ML) INJECTION EVERY 8 HOURS
Status: DISCONTINUED | OUTPATIENT
Start: 2022-10-25 | End: 2022-11-03 | Stop reason: HOSPADM

## 2022-10-25 RX ORDER — ACETAMINOPHEN 325 MG/1
650 TABLET ORAL
Status: DISCONTINUED | OUTPATIENT
Start: 2022-10-25 | End: 2022-11-03 | Stop reason: HOSPADM

## 2022-10-25 RX ORDER — IPRATROPIUM BROMIDE AND ALBUTEROL SULFATE 2.5; .5 MG/3ML; MG/3ML
3 SOLUTION RESPIRATORY (INHALATION)
Status: COMPLETED | OUTPATIENT
Start: 2022-10-25 | End: 2022-10-25

## 2022-10-25 RX ORDER — FLUTICASONE PROPIONATE 50 MCG
2 SPRAY, SUSPENSION (ML) NASAL
COMMUNITY

## 2022-10-25 RX ORDER — SODIUM CHLORIDE 0.9 % (FLUSH) 0.9 %
5-40 SYRINGE (ML) INJECTION AS NEEDED
Status: DISCONTINUED | OUTPATIENT
Start: 2022-10-25 | End: 2022-11-03 | Stop reason: HOSPADM

## 2022-10-25 RX ORDER — FUROSEMIDE 10 MG/ML
40 INJECTION INTRAMUSCULAR; INTRAVENOUS ONCE
Status: COMPLETED | OUTPATIENT
Start: 2022-10-26 | End: 2022-10-26

## 2022-10-25 RX ORDER — FUROSEMIDE 10 MG/ML
80 INJECTION INTRAMUSCULAR; INTRAVENOUS 2 TIMES DAILY
Status: DISCONTINUED | OUTPATIENT
Start: 2022-10-25 | End: 2022-10-27

## 2022-10-25 RX ORDER — ACETAMINOPHEN 650 MG/1
650 SUPPOSITORY RECTAL
Status: DISCONTINUED | OUTPATIENT
Start: 2022-10-25 | End: 2022-11-03 | Stop reason: HOSPADM

## 2022-10-25 RX ADMIN — IPRATROPIUM BROMIDE AND ALBUTEROL SULFATE 3 ML: .5; 3 SOLUTION RESPIRATORY (INHALATION) at 17:05

## 2022-10-25 RX ADMIN — CARVEDILOL 3.12 MG: 3.12 TABLET, FILM COATED ORAL at 18:45

## 2022-10-25 RX ADMIN — IPRATROPIUM BROMIDE AND ALBUTEROL SULFATE 3 ML: .5; 3 SOLUTION RESPIRATORY (INHALATION) at 22:55

## 2022-10-25 RX ADMIN — FUROSEMIDE 80 MG: 10 INJECTION, SOLUTION INTRAVENOUS at 17:03

## 2022-10-25 RX ADMIN — FUROSEMIDE 80 MG: 10 INJECTION, SOLUTION INTRAVENOUS at 18:46

## 2022-10-25 RX ADMIN — SACUBITRIL AND VALSARTAN 1 TABLET: 49; 51 TABLET, FILM COATED ORAL at 22:20

## 2022-10-25 RX ADMIN — SODIUM CHLORIDE, PRESERVATIVE FREE 10 ML: 5 INJECTION INTRAVENOUS at 22:20

## 2022-10-25 RX ADMIN — SODIUM CHLORIDE, PRESERVATIVE FREE 10 ML: 5 INJECTION INTRAVENOUS at 18:46

## 2022-10-25 NOTE — ROUTINE PROCESS
TRANSFER - OUT REPORT:    Verbal report given to [employer] RN(name) on Ila Cole  being transferred to (unit) for routine progression of care       Report consisted of patients Situation, Background, Assessment and   Recommendations(SBAR). Information from the following report(s) SBAR, ED Summary, Intake/Output, MAR, and Recent Results was reviewed with the receiving nurse. Lines:   Peripheral IV 10/25/22 Left Antecubital (Active)   Site Assessment Clean, dry, & intact 10/25/22 1244   Phlebitis Assessment 0 10/25/22 1244   Infiltration Assessment 0 10/25/22 1244   Dressing Status Clean, dry, & intact 10/25/22 1244   Dressing Type Transparent 10/25/22 1244   Hub Color/Line Status Pink;Flushed;Patent 10/25/22 1244   Action Taken Blood drawn 10/25/22 1244        Opportunity for questions and clarification was provided.       Patient transported with:   Monitor  O2 @ 2 liters  Registered Nurse

## 2022-10-25 NOTE — ED PROVIDER NOTES
Ila Cole is a 38 y.o. male with past medical history notable for congestive heart failure presenting with gradually worsening dyspnea exertion, lower extremity edema, and severe episodes of paroxysmal nocturnal dyspnea over the past week. He wakes up with \"panic attacks\" related to dyspnea over the past week. He states he been compliant with Lasix but has not taken some of the other medications recently prescribed by his cardiologist due to potential side effects, has not experienced side effects however. Denies fevers, chills. He also has a history of asthma. Past Medical History:   Diagnosis Date    Asthma     Gastrointestinal disorder     ulcers    Hypertension        Past Surgical History:   Procedure Laterality Date    HX ORTHOPAEDIC      R hand repair         Family History:   Problem Relation Age of Onset    Hypertension Mother        Social History     Socioeconomic History    Marital status: SINGLE     Spouse name: Not on file    Number of children: Not on file    Years of education: Not on file    Highest education level: Not on file   Occupational History    Not on file   Tobacco Use    Smoking status: Every Day     Packs/day: 0.25     Types: Cigarettes    Smokeless tobacco: Not on file   Substance and Sexual Activity    Alcohol use: No    Drug use: No    Sexual activity: Not on file   Other Topics Concern    Not on file   Social History Narrative    Not on file     Social Determinants of Health     Financial Resource Strain: Not on file   Food Insecurity: Not on file   Transportation Needs: Not on file   Physical Activity: Not on file   Stress: Not on file   Social Connections: Not on file   Intimate Partner Violence: Not on file   Housing Stability: Not on file         ALLERGIES: Patient has no known allergies. Review of Systems   Constitutional:  Positive for fatigue. Negative for chills and fever. HENT:  Negative for ear pain, sore throat and trouble swallowing.     Eyes: Negative for visual disturbance. Respiratory:  Positive for shortness of breath. Negative for cough. Cardiovascular:  Positive for leg swelling. Negative for chest pain. Gastrointestinal:  Negative for abdominal pain, nausea and vomiting. Genitourinary:  Negative for dysuria. Musculoskeletal:  Negative for back pain. Skin:  Negative for rash. Neurological:  Negative for syncope, light-headedness and headaches. Psychiatric/Behavioral:  Negative for confusion. All other systems reviewed and are negative. Vitals:    10/25/22 1206 10/25/22 1511 10/25/22 1519   BP: (!) 136/95 (!) 149/133    Pulse: (!) 104 99    Resp: 24 23    Temp: 98.9 °F (37.2 °C) 99.8 °F (37.7 °C)    SpO2: (!) 89% 96% 96%            Physical Exam  Constitutional:       General: He is not in acute distress. Appearance: He is obese. He is not toxic-appearing. HENT:      Head: Normocephalic and atraumatic. Mouth/Throat:      Mouth: Mucous membranes are moist.   Eyes:      Extraocular Movements: Extraocular movements intact. Neck:      Vascular: No JVD. Cardiovascular:      Rate and Rhythm: Normal rate and regular rhythm. Heart sounds: Normal heart sounds. Pulmonary:      Effort: Pulmonary effort is normal. No respiratory distress. Breath sounds: Rhonchi present. Abdominal:      Palpations: Abdomen is soft. Tenderness: There is no abdominal tenderness. Musculoskeletal:      Cervical back: Normal range of motion. Right lower leg: No tenderness. Edema present. Left lower leg: No tenderness. Edema present. Skin:     Capillary Refill: Capillary refill takes less than 2 seconds. Neurological:      General: No focal deficit present. Mental Status: He is alert and oriented to person, place, and time.    Psychiatric:         Mood and Affect: Mood normal.        [facility]       MEDICAL DECISION MAKIN y.o. male presents with Shortness of Breath and Peripheral Edema    Differential diagnosis includes but not limited to:    CHF, hyperkalemia from renal failure, liver failure, pneumonia, pleural effusion, hydrothorax    Appears to be CHF, no pulmonary edema but would make sense in the context of gradually worsening hypervolemia. He has severe PND. He appears to be markedly hypervolemic. Not been checking his weights. LABORATORY TESTS:  Labs Reviewed   CBC WITH AUTOMATED DIFF - Abnormal; Notable for the following components:       Result Value    HGB 10.3 (*)     MCV 79.3 (*)     MCH 22.0 (*)     MCHC 27.8 (*)     RDW 18.5 (*)     LYMPHOCYTES 10 (*)     MONOCYTES 16 (*)     ABS. MONOCYTES 1.4 (*)     All other components within normal limits   METABOLIC PANEL, COMPREHENSIVE - Abnormal; Notable for the following components:    Sodium 133 (*)     Chloride 93 (*)     Glucose 118 (*)     Creatinine 1.43 (*)     BUN/Creatinine ratio 8 (*)     Calcium 8.3 (*)     Protein, total 9.6 (*)     Albumin 2.8 (*)     Globulin 6.8 (*)     A-G Ratio 0.4 (*)     All other components within normal limits   NT-PRO BNP - Abnormal; Notable for the following components:    NT pro-BNP 4,293 (*)     All other components within normal limits   SAMPLES BEING HELD       IMAGING RESULTS:  XR CHEST PA LAT   Final Result      No acute process. Stable cardiac silhouette enlargement. MEDICATIONS GIVEN:  Medications   albuterol-ipratropium (DUO-NEB) 2.5 MG-0.5 MG/3 ML (has no administration in time range)   furosemide (LASIX) injection 80 mg (has no administration in time range)     EKG:  Reviewed           CONSULTS:  Discussed with family at bedside    [facility] for Admission  4:54 PM    ED Room Number: ER04/04  Patient Name and age:  Loyda Chandra 38 y.o.  male  Working Diagnosis:   1. Hypervolemia due to congestive heart failure ([address]    2. FRY (dyspnea on exertion)    3.  Acute on chronic congestive heart failure, unspecified heart failure type ([facility] [address]        COVID-19 Suspicion:  no  Sepsis present:  no  Reassessment needed: no  Code Status:  Full Code  Readmission: no  Isolation Requirements:  no  Recommended Level of Care:  telemetry  Department:[facility] Adult ED - 21   Other: Worsening dyspnea on exertion PND      IMPRESSION:  1. Hypervolemia due to congestive heart failure ([address]    2. FRY (dyspnea on exertion)    3. Acute on chronic congestive heart failure, unspecified heart failure type ([address]        PLAN:  - Admit to hospitalist    Rina Guthrie MD      Please note that this dictation was completed with [employer], the computer voice recognition software. Quite often unanticipated grammatical, syntax, homophones, and other interpretive errors are inadvertently transcribed by the computer software. Please disregard these errors. Please excuse any errors that have escaped final proofreading.   Procedures

## 2022-10-25 NOTE — PROGRESS NOTES
Admission Medication Reconciliation:    Information obtained from:  Patient at bedside  RxQuery data available¹:  YES    Comments/Recommendations: Updated PTA meds/reviewed patient's allergies. 1)  Spoke to patient at bedside who is a fair historian, able to confirm which medication he takes (and doesn't). Admits to poor compliance with some of his medications such as Coreg and Farxiga due to fear of side effects, but reports he knows he should be on those medications    2)  Medication changes (since last review): Added  - Flonase    Adjusted  - n/a    Removed  - Lisinopril     ¹RxQuery pharmacy benefit data reflects medications filled and processed through the patient's insurance, however   this data does NOT capture whether the medication was picked up or is currently being taken by the patient. Allergies:  Patient has no known allergies. Significant PMH/Disease States:   Past Medical History:   Diagnosis Date    Asthma     Gastrointestinal disorder     ulcers    Hypertension      Chief Complaint for this Admission:    Chief Complaint   Patient presents with    Shortness of Breath    Peripheral Edema     Prior to Admission Medications:   Prior to Admission Medications   Prescriptions Last Dose Informant Taking? Entresto 49-51 mg tab tablet  Self Yes   Sig: TAKE 1 TABLET BY MOUTH TWICE DAILY. STOP TAKING LISINOPRIL   Farxiga 5 mg tab tablet 2022 Self Yes   Sig: Take 5 mg by mouth daily. albuterol (PROVENTIL HFA, VENTOLIN HFA, PROAIR HFA) 90 mcg/actuation inhaler  Self Yes   Sig: Take 2 Puffs by inhalation every six (6) hours as needed for Wheezing. carvediloL (COREG) 3.125 mg tablet  Self Yes   Sig: Take 3.125 mg by mouth two (2) times a day. fluticasone propionate (FLONASE) 50 mcg/actuation nasal spray  Self Yes   Si Sprays by Both Nostrils route daily as needed. spironolactone (ALDACTONE) 25 mg tablet  Self Yes   Sig: Take 25 mg by mouth two (2) times a day.    torsemide (DEMADEX) 20 mg tablet  Self Yes   Sig: TAKE 5 TABLETS BY MOUTH TWICE DAILY      Facility-Administered Medications: None     Please contact the main inpatient pharmacy with any questions or concerns at [phone] and we will direct you to the clinical pharmacist covering this patient's care while in-house.    Aileen Monterroso, PHARMD

## 2022-10-25 NOTE — H&P
HISTORY AND PHYSICAL      PCP: Unknown, Provider, MD  History source: Patient       CC: Shortness of breath       HPI: A 38year old male patient with PMH of CHF with EF 20%, HTN presented to ED for evaluation of worsening sob. Patient has been non complain with his medications - he only takes torsemide and stopped taking other meds as he feels that he gets amnesia/ memory impairment. He has been noticing worsening sob since 1 month. He was not able to sleep. + PND and orthopnea. He c/o worsening edema of legs and abdominal distension. He does have chronic productive cough. He also states that he has on/off chest pains. He denied fever, abd pain, urinary or bowel changes. In ED, pro BNP elevated , IV lasix 80mg given. Hospitalist consulted for admission       PMH/PSH:  Past Medical History:   Diagnosis Date    Asthma     Gastrointestinal disorder     ulcers    Hypertension      Past Surgical History:   Procedure Laterality Date    HX ORTHOPAEDIC      R hand repair       Home meds:   Prior to Admission medications    Medication Sig Start Date End Date Taking? Authorizing Provider   carvediloL (COREG) 3.125 mg tablet Take 3.125 mg by mouth two (2) times a day. 4/25/22   Robby Garzon MD   Farxiga 5 mg tab tablet Take 5 mg by mouth daily. 6/18/22   Robby Garzon MD   lisinopriL (PRINIVIL, ZESTRIL) 20 mg tablet Take 20 mg by mouth daily. 5/19/22   Robby Garzon MD   Entresto 49-51 mg tab tablet TAKE 1 TABLET BY MOUTH TWICE DAILY. STOP TAKING LISINOPRIL 6/20/22   Robby Garzon MD   spironolactone (ALDACTONE) 25 mg tablet Take 25 mg by mouth two (2) times a day. 6/18/22   Robby Garzon MD   torsemide (DEMADEX) 20 mg tablet TAKE 5 TABLETS BY MOUTH TWICE DAILY 6/24/22   Robby Garzon MD   albuterol (PROVENTIL HFA, VENTOLIN HFA, PROAIR HFA) 90 mcg/actuation inhaler Take 2 Puffs by inhalation every six (6) hours as needed for Wheezing.  12/31/16   Mala Escobar MD   dextromethorphan-guaiFENesin (ROBITUSSIN-DM)  mg/5 mL syrup Take 10 mL by mouth every six (6) hours as needed for Cough. 12/31/16   Leana Miller MD       Allergies:  No Known Allergies    FH:  Family History   Problem Relation Age of Onset    Hypertension Mother        SH:  Social History     Tobacco Use    Smoking status: Every Day     Packs/day: 0.25     Types: Cigarettes    Smokeless tobacco: Not on file   Substance Use Topics    Alcohol use: No   He denied smoker or alcohol intake or illicit drug use     ROS: A comprehensive review of systems was negative except for that written in the HPI. PHYSICAL EXAM:  Visit Vitals  BP (!) 139/93 (BP 1 Location: Left upper arm, BP Patient Position: At rest)   Pulse (!) 102   Temp 99.8 °F (37.7 °C)   Resp 20   SpO2 95%       Gen: mild distress, chronically ill   HEENT: anicteric sclerae, normal conjunctiva, oropharynx clear, MM moist  Neck: supple, trachea midline, no adenopathy  Heart: RRR, no MRG, no JVD, +++ edema  Lungs: CTA b/l, non-labored respirations  Abd: soft, NT, ND, BS+, no organomegaly  Extr: warm  Skin: dry, no rash  Neuro: CN II-XII grossly intact, normal speech, moves all extremities  Psych: normal mood, appropriate affect        Labs/Imaging:  Recent Results (from the past 24 hour(s))   CBC WITH AUTOMATED DIFF    Collection Time: 10/25/22 12:40 PM   Result Value Ref Range    WBC 9.0 4.1 - 11.1 K/uL    RBC 4.68 4.10 - 5.70 M/uL    HGB 10.3 (L) 12.1 - 17.0 g/dL    HCT 37.1 36.6 - 50.3 %    MCV 79.3 (L) 80.0 - 99.0 FL    MCH 22.0 (L) 26.0 - 34.0 PG    MCHC 27.8 (L) 30.0 - 36.5 g/dL    RDW 18.5 (H) 11.5 - 14.5 %    PLATELET 988 993 - 904 K/uL    MPV 9.3 8.9 - 12.9 FL    NRBC 0.0 0  WBC    ABSOLUTE NRBC 0.00 0.00 - 0.01 K/uL    NEUTROPHILS 74 32 - 75 %    LYMPHOCYTES 10 (L) 12 - 49 %    MONOCYTES 16 (H) 5 - 13 %    EOSINOPHILS 0 0 - 7 %    BASOPHILS 0 0 - 1 %    IMMATURE GRANULOCYTES 0 0.0 - 0.5 %    ABS. NEUTROPHILS 6.7 1.8 - 8.0 K/UL    ABS. LYMPHOCYTES 0.9 0.8 - 3.5 K/UL    ABS.  MONOCYTES 1.4 (H) 0.0 - 1.0 K/UL    ABS. EOSINOPHILS 0.0 0.0 - 0.4 K/UL    ABS. BASOPHILS 0.0 0.0 - 0.1 K/UL    ABS. IMM. GRANS. 0.0 0.00 - 0.04 K/UL    DF SMEAR SCANNED      RBC COMMENTS ANISOCYTOSIS  1+        RBC COMMENTS MICROCYTOSIS  1+        RBC COMMENTS HYPOCHROMIA  2+        RBC COMMENTS POLYCHROMASIA  PRESENT       METABOLIC PANEL, COMPREHENSIVE    Collection Time: 10/25/22 12:40 PM   Result Value Ref Range    Sodium 133 (L) 136 - 145 mmol/L    Potassium 3.5 3.5 - 5.1 mmol/L    Chloride 93 (L) 97 - 108 mmol/L    CO2 31 21 - 32 mmol/L    Anion gap 9 5 - 15 mmol/L    Glucose 118 (H) 65 - 100 mg/dL    BUN 11 6 - 20 MG/DL    Creatinine 1.43 (H) 0.70 - 1.30 MG/DL    BUN/Creatinine ratio 8 (L) 12 - 20      eGFR >60 >60 ml/min/1.73m2    Calcium 8.3 (L) 8.5 - 10.1 MG/DL    Bilirubin, total 1.0 0.2 - 1.0 MG/DL    ALT (SGPT) 15 12 - 78 U/L    AST (SGOT) 20 15 - 37 U/L    Alk. phosphatase 53 45 - 117 U/L    Protein, total 9.6 (H) 6.4 - 8.2 g/dL    Albumin 2.8 (L) 3.5 - 5.0 g/dL    Globulin 6.8 (H) 2.0 - 4.0 g/dL    A-G Ratio 0.4 (L) 1.1 - 2.2     SAMPLES BEING HELD    Collection Time: 10/25/22 12:40 PM   Result Value Ref Range    SAMPLES BEING HELD 1RED 1BLUE     COMMENT        Add-on orders for these samples will be processed based on acceptable specimen integrity and analyte stability, which may vary by analyte. NT-PRO BNP    Collection Time: 10/25/22 12:40 PM   Result Value Ref Range    NT pro-BNP 4,293 (H) <125 PG/ML       Recent Labs     10/25/22  1240   WBC 9.0   HGB 10.3*   HCT 37.1        Recent Labs     10/25/22  1240   *   K 3.5   CL 93*   CO2 31   BUN 11   CREA 1.43*   *   CA 8.3*     Recent Labs     10/25/22  1240   ALT 15   AP 53   TBILI 1.0   TP 9.6*   ALB 2.8*   GLOB 6.8*       No results for input(s): CPK, CKNDX, TROIQ in the last 72 hours. No lab exists for component: CPKMB    No results for input(s): INR, PTP, APTT, INREXT in the last 72 hours.      No results for input(s): PH, PCO2, PO2 in the last 72 hours. All labs and imaging personally reviewed by me       Assessment & Plan:   Acute on Chronic Systolic and diastolic CHF NYHA class IV on poa  - admit to tele   - last echo in 2018 with EF 20%  - pro BNP 4293  - CXR: clear lungs  - cardiology consult, echo ordered   - strict I&Os , daily weight   - c/w IV lasix 80mg bid   - restarted cardiac meds coreg, jardiance, entresto. - hold aldactone   - venous duplex, trop, d dimer ordered   - saturating on 2l NC, try to wean off     ANH- cr 1.43 on poa  - likely due to CHF  - will watch closely while on diuresis   - nephro consult if cr worsens     HTN  - c/w home meds coreg, entresto       DVT ppx: Lovenox   Code status: Full. Mother at bedside and was updated about the plan   Disposition: TBD.  Home likely when ready     Signed By: Lilliana Maria MD     October 25, 2022

## 2022-10-25 NOTE — ED NOTES
Bedside and Verbal shift change report given to Sushma Jacobs RN (oncoming nurse) by Justina Carson RN (offgoing nurse). Report included the following information SBAR, ED Summary, MAR and Recent Results.

## 2022-10-25 NOTE — DISCHARGE INSTRUCTIONS
Download the Heart Failure [place] Cecilia: Search in your [employer] (Android) or Meet My Friends Cecilia Store (WhatSalonphone): Search for- HF [place] Cecilia.    BOKU    HF [place] is a brand-new phone cecilia that helps you track daily symptoms, vitals, mood, energy level and more. You can even add your heart failure care team members to view your data and monitor your condition at home. [facility] [place] lets you:  Track symptoms, medications and more  Share health information with your health care team  Connect with others living with heart failure     [facility] Visit Follow-Up Instructions/Information    You may have an in home follow up visit set up [facility] or may wish to contact [facility] to set-up a visit:    What are we? [facility] is an in-home urgent care service staffed with emergency trained medical teams. We come to your home in a vehicle stocked with medical supplies and technology. An ER physician is always available if needed. When? As a part of your hospital follow-up, an appointment has been/ or can be set up for us to come see you. Usually, this will be 24-72 hours after you leave the hospital or as needed. [employer] is open 7am-9pm, 7 days a week, 365 days a year, including holidays. Why? We know that you cannot always get to your doctor after being in the hospital and that your doctor is not always available when you need them. Once your workup is complete, we'll call in your prescriptions, update your family doctor, and handle billing with your insurance so you can focus on feeling better, faster without leaving home. How much? We accept most major health insurance plans, including Medicaid, Medicare, and Medicare Advantage [facility], [facility], [facility], and [employer].  We also accept: credit, debit, health savings account (HSA), health reimbursement account (HRA) and flexible spending account (FSA) payments. [facility]'s prices compare to conventional urgent care facilities, but we bring the care to you. How to reach us? Getting care is easy- use our mobile arianna ([employer]), website ([email or web address]) or call us [phone].

## 2022-10-25 NOTE — ED TRIAGE NOTES
Pt c/o SOB and increased swelling. Pt reports taking his diuretic as prescribed with no improvement. Denies CP. Pt 89% on room air on arrival. Pt placed on 2L nasal cannula and improved to 95%.

## 2022-10-26 ENCOUNTER — APPOINTMENT (OUTPATIENT)
Dept: NON INVASIVE DIAGNOSTICS | Age: 42
DRG: 194 | End: 2022-10-26
Attending: INTERNAL MEDICINE
Payer: MEDICAID

## 2022-10-26 ENCOUNTER — APPOINTMENT (OUTPATIENT)
Dept: CT IMAGING | Age: 42
DRG: 194 | End: 2022-10-26
Attending: INTERNAL MEDICINE
Payer: MEDICAID

## 2022-10-26 LAB
ANION GAP SERPL CALC-SCNC: 5 MMOL/L (ref 5–15)
ARTERIAL PATENCY WRIST A: POSITIVE
B PERT DNA SPEC QL NAA+PROBE: NOT DETECTED
BASE EXCESS BLD CALC-SCNC: 6.3 MMOL/L
BDY SITE: ABNORMAL
BORDETELLA PARAPERTUSSIS PCR, BORPAR: NOT DETECTED
BUN SERPL-MCNC: 17 MG/DL (ref 6–20)
BUN/CREAT SERPL: 13 (ref 12–20)
C PNEUM DNA SPEC QL NAA+PROBE: NOT DETECTED
CALCIUM SERPL-MCNC: 7.9 MG/DL (ref 8.5–10.1)
CHLORIDE SERPL-SCNC: 95 MMOL/L (ref 97–108)
CO2 SERPL-SCNC: 33 MMOL/L (ref 21–32)
CREAT SERPL-MCNC: 1.26 MG/DL (ref 0.7–1.3)
ERYTHROCYTE [DISTWIDTH] IN BLOOD BY AUTOMATED COUNT: 18.6 % (ref 11.5–14.5)
FLUAV SUBTYP SPEC NAA+PROBE: NOT DETECTED
FLUBV RNA SPEC QL NAA+PROBE: NOT DETECTED
GAS FLOW.O2 O2 DELIVERY SYS: ABNORMAL L/MIN
GLUCOSE SERPL-MCNC: 120 MG/DL (ref 65–100)
HADV DNA SPEC QL NAA+PROBE: NOT DETECTED
HCO3 BLD-SCNC: 31.6 MMOL/L (ref 22–26)
HCOV 229E RNA SPEC QL NAA+PROBE: NOT DETECTED
HCOV HKU1 RNA SPEC QL NAA+PROBE: NOT DETECTED
HCOV NL63 RNA SPEC QL NAA+PROBE: NOT DETECTED
HCOV OC43 RNA SPEC QL NAA+PROBE: NOT DETECTED
HCT VFR BLD AUTO: 36.6 % (ref 36.6–50.3)
HGB BLD-MCNC: 10.4 G/DL (ref 12.1–17)
HMPV RNA SPEC QL NAA+PROBE: NOT DETECTED
HPIV1 RNA SPEC QL NAA+PROBE: NOT DETECTED
HPIV2 RNA SPEC QL NAA+PROBE: NOT DETECTED
HPIV3 RNA SPEC QL NAA+PROBE: NOT DETECTED
HPIV4 RNA SPEC QL NAA+PROBE: NOT DETECTED
LACTATE SERPL-SCNC: 1.1 MMOL/L (ref 0.4–2)
M PNEUMO DNA SPEC QL NAA+PROBE: NOT DETECTED
MCH RBC QN AUTO: 22.7 PG (ref 26–34)
MCHC RBC AUTO-ENTMCNC: 28.4 G/DL (ref 30–36.5)
MCV RBC AUTO: 79.7 FL (ref 80–99)
NRBC # BLD: 0.02 K/UL (ref 0–0.01)
NRBC BLD-RTO: 0.3 PER 100 WBC
O2/TOTAL GAS SETTING VFR VENT: 4 %
PCO2 BLD: 47 MMHG (ref 35–45)
PH BLD: 7.44 [PH] (ref 7.35–7.45)
PLATELET # BLD AUTO: 309 K/UL (ref 150–400)
PMV BLD AUTO: 9.7 FL (ref 8.9–12.9)
PO2 BLD: 78 MMHG (ref 80–100)
POTASSIUM SERPL-SCNC: 3.9 MMOL/L (ref 3.5–5.1)
PROCALCITONIN SERPL-MCNC: 0.19 NG/ML
RBC # BLD AUTO: 4.59 M/UL (ref 4.1–5.7)
RSV RNA SPEC QL NAA+PROBE: NOT DETECTED
RV+EV RNA SPEC QL NAA+PROBE: NOT DETECTED
SAO2 % BLD: 95.6 % (ref 92–97)
SARS-COV-2 PCR, COVPCR: NOT DETECTED
SODIUM SERPL-SCNC: 133 MMOL/L (ref 136–145)
SPECIMEN TYPE: ABNORMAL
TROPONIN-HIGH SENSITIVITY: 149 NG/L (ref 0–76)
TROPONIN-HIGH SENSITIVITY: 164 NG/L (ref 0–76)
WBC # BLD AUTO: 7.3 K/UL (ref 4.1–11.1)

## 2022-10-26 PROCEDURE — 80048 BASIC METABOLIC PNL TOTAL CA: CPT

## 2022-10-26 PROCEDURE — 94640 AIRWAY INHALATION TREATMENT: CPT

## 2022-10-26 PROCEDURE — 36600 WITHDRAWAL OF ARTERIAL BLOOD: CPT

## 2022-10-26 PROCEDURE — 74011250636 HC RX REV CODE- 250/636: Performed by: HOSPITALIST

## 2022-10-26 PROCEDURE — 36415 COLL VENOUS BLD VENIPUNCTURE: CPT

## 2022-10-26 PROCEDURE — 85027 COMPLETE CBC AUTOMATED: CPT

## 2022-10-26 PROCEDURE — 0202U NFCT DS 22 TRGT SARS-COV-2: CPT

## 2022-10-26 PROCEDURE — 77010033678 HC OXYGEN DAILY

## 2022-10-26 PROCEDURE — 74011250637 HC RX REV CODE- 250/637: Performed by: INTERNAL MEDICINE

## 2022-10-26 PROCEDURE — 74011000250 HC RX REV CODE- 250: Performed by: INTERNAL MEDICINE

## 2022-10-26 PROCEDURE — 65270000046 HC RM TELEMETRY

## 2022-10-26 PROCEDURE — 74011250636 HC RX REV CODE- 250/636: Performed by: INTERNAL MEDICINE

## 2022-10-26 PROCEDURE — 82803 BLOOD GASES ANY COMBINATION: CPT

## 2022-10-26 PROCEDURE — 83605 ASSAY OF LACTIC ACID: CPT

## 2022-10-26 PROCEDURE — 71275 CT ANGIOGRAPHY CHEST: CPT

## 2022-10-26 PROCEDURE — 74011000636 HC RX REV CODE- 636: Performed by: INTERNAL MEDICINE

## 2022-10-26 PROCEDURE — 84145 PROCALCITONIN (PCT): CPT

## 2022-10-26 PROCEDURE — 84484 ASSAY OF TROPONIN QUANT: CPT

## 2022-10-26 PROCEDURE — 74011000258 HC RX REV CODE- 258: Performed by: INTERNAL MEDICINE

## 2022-10-26 RX ORDER — ENOXAPARIN SODIUM 100 MG/ML
30 INJECTION SUBCUTANEOUS EVERY 12 HOURS
Status: DISCONTINUED | OUTPATIENT
Start: 2022-10-26 | End: 2022-11-02 | Stop reason: SDUPTHER

## 2022-10-26 RX ADMIN — CARVEDILOL 3.12 MG: 3.12 TABLET, FILM COATED ORAL at 09:03

## 2022-10-26 RX ADMIN — IOPAMIDOL 100 ML: 755 INJECTION, SOLUTION INTRAVENOUS at 15:21

## 2022-10-26 RX ADMIN — SODIUM CHLORIDE, PRESERVATIVE FREE 10 ML: 5 INJECTION INTRAVENOUS at 05:32

## 2022-10-26 RX ADMIN — ENOXAPARIN SODIUM 30 MG: 100 INJECTION SUBCUTANEOUS at 09:03

## 2022-10-26 RX ADMIN — CARVEDILOL 3.12 MG: 3.12 TABLET, FILM COATED ORAL at 18:48

## 2022-10-26 RX ADMIN — EMPAGLIFLOZIN 10 MG: 10 TABLET, FILM COATED ORAL at 09:07

## 2022-10-26 RX ADMIN — ENOXAPARIN SODIUM 30 MG: 100 INJECTION SUBCUTANEOUS at 22:46

## 2022-10-26 RX ADMIN — IPRATROPIUM BROMIDE AND ALBUTEROL SULFATE 3 ML: .5; 3 SOLUTION RESPIRATORY (INHALATION) at 16:12

## 2022-10-26 RX ADMIN — PIPERACILLIN AND TAZOBACTAM 4.5 G: 4; .5 INJECTION, POWDER, FOR SOLUTION INTRAVENOUS at 18:48

## 2022-10-26 RX ADMIN — FUROSEMIDE 40 MG: 10 INJECTION, SOLUTION INTRAVENOUS at 01:20

## 2022-10-26 RX ADMIN — FUROSEMIDE 80 MG: 10 INJECTION, SOLUTION INTRAVENOUS at 18:48

## 2022-10-26 RX ADMIN — FUROSEMIDE 80 MG: 10 INJECTION, SOLUTION INTRAVENOUS at 09:03

## 2022-10-26 RX ADMIN — SODIUM CHLORIDE, PRESERVATIVE FREE 10 ML: 5 INJECTION INTRAVENOUS at 22:47

## 2022-10-26 NOTE — PROGRESS NOTES
18 MD notified of pt's Dyspnea an exertion and at rest. On 4L via NC. Pt with noted expiratory wheezing and coarse lung sounds. Critical troponin reported per lab. Pt denies CP. Pt also noted to have 2 large chic-christian-a cups of water and lemonade half consumed. Pt educated on fluid restrictions and the importance of compliance. Pt states he is aware of fluid restrictions. Fluids removed from bedside. MD aware. New order for lasix now, cxr, abg, and bipap if needed. RT on unit and notified. 0230 MD notified of critical troponin results and of abg. Pt does not need bipap or transfer at this time.  Pt to remain on unit per MD.

## 2022-10-26 NOTE — PROGRESS NOTES
Physician Progress Note      PATIENT:               Torrie Guido  CSN #:                  [id number]  :                       1980  ADMIT DATE:       10/25/2022 3:06 PM  [address] [place] DATE:  RESPONDING  PROVIDER #:        Angel Aviles MD          QUERY TEXT:    Patient admitted with acute CHF. Noted documentation of Acute Kidney Injury in H&P in a patient whose admitting Cr is not 1.5x baseline of 1.09 from 2022. In order to support the diagnosis of ANH, please include additional clinical indicators in your documentation. ? Or please document if the diagnosis of ANH has been ruled out after further study. The medical record reflects the following:  Risk Factors: HTN    Clinical Indicators:    Cr 1.43-> 1.26 (1.09 on 22), (1.16 on 21)    H&P-  ANH- cr 1.43 on poa  - likely due to CHF  - will watch closely while on diuresis  - nephro consult if cr worsens    Treatment: monitor while on diuretics; monitor BMP; Defined by Kidney Disease Improving Global Outcomes (KDIGO) clinical practice guideline for acute kidney injury:  -Increase in SCr by greater than or equal to 0.3 mg/dl within 48 hours; or  -Increase or decrease in SCr to greater than or equal to 1.5 times baseline, which is known or presumed to have occurred within the prior 7 days; or  -Urine volume < 0.5ml/kg/h for 6 hours. Thank you,  Trang Brown RN, BSN, CRCR, CCDS, SMART  Clinical Documentation Improvement  Jannet Alvarez. [email or web address]  LM at [phone] or via Perfect Serve  Options provided:  -- Acute kidney injury evidenced by, Please document evidence as well as a numerical baseline creatinine, if known. -- Currently resolved acute kidney injury was evidenced by, Please document evidence as well as a numerical baseline creatinine, if known.   -- Acute kidney injury ruled out after study  -- Other - I will add my own diagnosis  -- Disagree - Not applicable / Not valid  -- Disagree - Clinically unable to determine / Unknown  -- Refer to Clinical Documentation Reviewer    PROVIDER RESPONSE TEXT:    Now resolved acute kidney injury was evidenced by cr 1.43 on poa    Query created by: Oralia Damon on 10/26/2022 11:46 AM      Electronically signed by:  Art Kang MD 10/26/2022 1:39 PM

## 2022-10-26 NOTE — PROGRESS NOTES
Cc:  worsening Hypoxia/Resp failure      A/P :   likely 2/2 chf exacerbation  Iv lasix 40mg now  Abg   BIPAP  CXR

## 2022-10-26 NOTE — PROGRESS NOTES
Hospitalist Progress Note      Hospital summary: A 38year old male patient with PMH of CHF with EF 20%, HTN presented to ED for evaluation of worsening sob. Patient has been non complain with his medications - he only takes torsemide and stopped taking other meds as he feels that he gets amnesia/ memory impairment. He has been noticing worsening sob since 1 month. He was not able to sleep. + PND and orthopnea. He c/o worsening edema of legs and abdominal distension. He does have chronic productive cough. He also states that he has on/off chest pains. He denied fever, abd pain, urinary or bowel changes. In ED, pro BNP elevated , IV lasix 80mg given. Hospitalist consulted for admission  10/25/2022      Assessment/Plan:  Acute on Chronic Systolic and diastolic CHF NYHA class IV on poa  Acute hypoxic respiratory failure   - last echo in 2018 with EF 20%  - pro BNP 4293  - CT: Mild groundglass opacity which may represent mild pulmonary edema. - appreciate cardiology input  - echo pending    - strict I&Os , daily weight   - c/w IV lasix 80mg bid ------>not much diuresing   - c/w cardiac meds coreg, jardiance, entresto. hold aldactone   - venous duplex: no DVT   - saturating on 4l NC, try to wean off     Fever on 10/26 . Possible aspiration PNA   - normal lactic, procalcitonin  - resp panel neg  - CTA chest: no PE. Small nodular airspace opacities in the right lower lobe may reflect aspiration or infection  - started on zosyn      ANH- cr 1.43 on poa - cr improved   - likely due to CHF  - will watch closely while on diuresis      HTN  - c/w home meds coreg, entresto     Morbid obesity Body mass index is 44.48 kg/m². Recommend weight loss     Suspect YONATAN- sleep study as outpt     Not currently a candidate for advanced CHF therapies given non-compliance with follow-up and medications, morbid obesity and poor social support system     DVT ppx: Lovenox   Code status: Full. Disposition: TBD.  Home likely when ready   ----------------------------------------------    CC: SOB     S: Patient is seen and examined at bedside this AM. He feels the same. Still has sob, requesting bigger bed. He also wants to drink fluids - stressed about 1500ml fluid restriction and also recommended no food from outside ( [employer] food present with large cup)     Discussed with nursing     Review of Systems:  A comprehensive review of systems was negative.     O:  Visit Vitals  BP (!) 112/90 (BP 1 Location: Left upper arm, BP Patient Position: Sitting)   Pulse 76   Temp 98.1 °F (36.7 °C)   Resp 30   Ht 5' 10\" (1.778 m)   Wt 140.6 kg (309 lb 15.5 oz)   SpO2 95%   BMI 44.48 kg/m²       PHYSICAL EXAM:  Gen: NAD  HEENT: anicteric sclerae, normal conjunctiva, oropharynx clear, MM moist  Neck: supple, trachea midline, no adenopathy  Heart: RRR, no MRG, no JVD, no peripheral edema  Lungs: CTA b/l, non-labored respirations  Abd: soft, NT, ND, BS+, no organomegaly  Extr: warm  Skin: dry, no rash  Neuro: CN II-XII grossly intact, normal speech, moves all extremities  Psych: normal mood, appropriate affect      Intake/Output Summary (Last 24 hours) at 10/26/2022 1352  Last data filed at 10/26/2022 0400  Gross per 24 hour   Intake --   Output 1475 ml   Net -1475 ml        Recent labs & imaging reviewed:  Recent Results (from the past 24 hour(s))   TROPONIN-HIGH SENSITIVITY    Collection Time: 10/25/22  7:29 PM   Result Value Ref Range    Troponin-High Sensitivity 135 (HH) 0 - 76 ng/L   D DIMER    Collection Time: 10/25/22  7:29 PM   Result Value Ref Range    D-dimer 0.93 (H) 0.00 - 0.65 mg/L FEU   POC G3 - PUL    Collection Time: 10/26/22 12:35 AM   Result Value Ref Range    FIO2 (POC) 4 %    pH (POC) 7.44 7.35 - 7.45      pCO2 (POC) 47.0 (H) 35.0 - 45.0 MMHG    pO2 (POC) 78 (L) 80 - 100 MMHG    HCO3 (POC) 31.6 (H) 22 - 26 MMOL/L    sO2 (POC) 95.6 92 - 97 %    Base excess (POC) 6.3 mmol/L    Site RIGHT RADIAL      Device: NASAL CANNULA      Allens test (POC) Positive      Specimen type (POC) ARTERIAL     METABOLIC PANEL, BASIC    Collection Time: 10/26/22  1:07 AM   Result Value Ref Range    Sodium 133 (L) 136 - 145 mmol/L    Potassium 3.9 3.5 - 5.1 mmol/L    Chloride 95 (L) 97 - 108 mmol/L    CO2 33 (H) 21 - 32 mmol/L    Anion gap 5 5 - 15 mmol/L    Glucose 120 (H) 65 - 100 mg/dL    BUN 17 6 - 20 MG/DL    Creatinine 1.26 0.70 - 1.30 MG/DL    BUN/Creatinine ratio 13 12 - 20      eGFR >60 >60 ml/min/1.73m2    Calcium 7.9 (L) 8.5 - 10.1 MG/DL   CBC W/O DIFF    Collection Time: 10/26/22  1:07 AM   Result Value Ref Range    WBC 7.3 4.1 - 11.1 K/uL    RBC 4.59 4. 10 - 5.70 M/uL    HGB 10.4 (L) 12.1 - 17.0 g/dL    HCT 36.6 36.6 - 50.3 %    MCV 79.7 (L) 80.0 - 99.0 FL    MCH 22.7 (L) 26.0 - 34.0 PG    MCHC 28.4 (L) 30.0 - 36.5 g/dL    RDW 18.6 (H) 11.5 - 14.5 %    PLATELET 981 357 - 342 K/uL    MPV 9.7 8.9 - 12.9 FL    NRBC 0.3 (H) 0  WBC    ABSOLUTE NRBC 0.02 (H) 0.00 - 0.01 K/uL   TROPONIN-HIGH SENSITIVITY    Collection Time: 10/26/22  1:07 AM   Result Value Ref Range    Troponin-High Sensitivity 164 (HH) 0 - 76 ng/L   TROPONIN-HIGH SENSITIVITY    Collection Time: 10/26/22  3:32 AM   Result Value Ref Range    Troponin-High Sensitivity 149 (HH) 0 - 76 ng/L   PROCALCITONIN    Collection Time: 10/26/22 11:22 AM   Result Value Ref Range    Procalcitonin 0.19 ng/mL   LACTIC ACID    Collection Time: 10/26/22 11:22 AM   Result Value Ref Range    Lactic acid 1.1 0.4 - 2.0 MMOL/L     Recent Labs     10/26/22  0107 10/25/22  1240   WBC 7.3 9.0   HGB 10.4* 10.3*   HCT 36.6 37.1    316     Recent Labs     10/26/22  0107 10/25/22  1240   * 133*   K 3.9 3.5   CL 95* 93*   CO2 33* 31   BUN 17 11   CREA 1.26 1.43*   * 118*   CA 7.9* 8.3*     Recent Labs     10/25/22  1240   ALT 15   AP 53   TBILI 1.0   TP 9.6*   ALB 2.8*   GLOB 6.8*     No results for input(s): INR, PTP, APTT, INREXT in the last 72 hours.    No results for input(s): FE, TIBC, PSAT, FERR in the last 72 hours. No results found for: FOL, RBCF   No results for input(s): PH, PCO2, PO2 in the last 72 hours. No results for input(s): CPK, CKNDX, TROIQ in the last 72 hours.     No lab exists for component: CPKMB  No results found for: CHOL, CHOLX, CHLST, CHOLV, HDL, HDLP, LDL, LDLC, DLDLP, TGLX, TRIGL, TRIGP, CHHD, CHHDX  Lab Results   Component Value Date/Time    Glucose (POC) 99 12/04/2014 12:44 PM     No results found for: COLOR, APPRN, SPGRU, REFSG, CORINNA, PROTU, GLUCU, KETU, BILU, UROU, LALO, LEUKU, GLUKE, EPSU, BACTU, WBCU, RBCU, CASTS, UCRY    Med list reviewed  Current Facility-Administered Medications   Medication Dose Route Frequency    enoxaparin (LOVENOX) injection 30 mg  30 mg SubCUTAneous Q12H    sodium chloride (NS) flush 5-40 mL  5-40 mL IntraVENous Q8H    sodium chloride (NS) flush 5-40 mL  5-40 mL IntraVENous PRN    acetaminophen (TYLENOL) tablet 650 mg  650 mg Oral Q6H PRN    Or    acetaminophen (TYLENOL) suppository 650 mg  650 mg Rectal Q6H PRN    polyethylene glycol (MIRALAX) packet 17 g  17 g Oral DAILY PRN    ondansetron (ZOFRAN ODT) tablet 4 mg  4 mg Oral Q8H PRN    Or    ondansetron (ZOFRAN) injection 4 mg  4 mg IntraVENous Q6H PRN    carvediloL (COREG) tablet 3.125 mg  3.125 mg Oral BID    sacubitriL-valsartan (ENTRESTO) 49-51 mg tablet 1 Tablet  1 Tablet Oral Q12H    empagliflozin (JARDIANCE) tablet 10 mg  10 mg Oral DAILY    albuterol-ipratropium (DUO-NEB) 2.5 MG-0.5 MG/3 ML  3 mL Nebulization Q4H PRN    furosemide (LASIX) injection 80 mg  80 mg IntraVENous BID       Care Plan discussed with:  Patient/Family and Nurse    Socorro Apley, MD  Internal Medicine  Date of Service: 10/26/2022

## 2022-10-26 NOTE — CONSULTS
S Cardiology Consult Note    Date of consult:  10/26/22  Date of admission: 10/25/2022  Primary Cardiologist: Dr Lisa [facility])  Physician Requesting consult: Dr Keven Mckeon / Reason for consult: CHF    History of the presenting illness:  Gordon Del Real is a 38 y.o. M admitted with shortness of breath. He has been having difficulty sleeping, with orthopnea. Has had progressive dyspnea on exertion and edema that isn't responding to lasix. He has a known history of CHF, apparently with an EF around 20%. He is normally seen by [facility] but has not been to recent follow-up visits, stating that he's been raising kids by himself and has a lot of social challenges. He has not been taking CHF meds other than lasix recently. Past Medical History:   Diagnosis Date    Asthma     Gastrointestinal disorder     ulcers    Hypertension        Prior to Admission medications    Medication Sig Start Date End Date Taking? Authorizing Provider   fluticasone propionate (FLONASE) 50 mcg/actuation nasal spray 2 Sprays by Both Nostrils route daily as needed. Yes Provider, Historical   carvediloL (COREG) 3.125 mg tablet Take 3.125 mg by mouth two (2) times a day. 4/25/22  Yes Other, MD Saurabh Bustamante [place] 5 mg tab tablet Take 5 mg by mouth daily. 6/18/22  Yes Other, MD Zia Bustamantesto 49-51 mg tab tablet TAKE 1 TABLET BY MOUTH TWICE DAILY. STOP TAKING LISINOPRIL 6/20/22  Yes Other, MD Robby   spironolactone (ALDACTONE) 25 mg tablet Take 25 mg by mouth two (2) times a day. 6/18/22  Yes Other, MD Robby   torsemide (DEMADEX) 20 mg tablet TAKE 5 TABLETS BY MOUTH TWICE DAILY 6/24/22  Yes Other, MD Robby   albuterol (PROVENTIL HFA, VENTOLIN HFA, PROAIR HFA) 90 mcg/actuation inhaler Take 2 Puffs by inhalation every six (6) hours as needed for Wheezing.  12/31/16  Yes Michelle Madrid MD       No Known Allergies     Family History   Problem Relation Age of Onset    Hypertension Mother      No pertinent CV family history    Social History     Socioeconomic History    Marital status: SINGLE     Spouse name: Not on file    Number of children: Not on file    Years of education: Not on file    Highest education level: Not on file   Occupational History    Not on file   Tobacco Use    Smoking status: Every Day     Packs/day: 0.25     Types: Cigarettes    Smokeless tobacco: Not on file   Substance and Sexual Activity    Alcohol use: No    Drug use: No    Sexual activity: Not on file   Other Topics Concern    Not on file   Social History Narrative    Not on file     Social Determinants of Health     Financial Resource Strain: Not on file   Food Insecurity: Not on file   Transportation Needs: Not on file   Physical Activity: Not on file   Stress: Not on file   Social Connections: Not on file   Intimate Partner Violence: Not on file   Housing Stability: Not on file       Review of Systems   Constitutional:  Negative for chills and fever. HENT:  Negative for hearing loss and nosebleeds. Eyes:  Negative for blurred vision and double vision. Respiratory:  Positive for shortness of breath and wheezing. Negative for cough and sputum production. Cardiovascular:  Positive for orthopnea, leg swelling and PND. Negative for chest pain. Gastrointestinal:  Negative for abdominal pain, nausea and vomiting. Genitourinary:  Negative for frequency and urgency. Musculoskeletal:  Negative for back pain and joint pain. Skin:  Negative for itching and rash. Neurological:  Negative for dizziness and headaches. Endo/Heme/Allergies:  Negative for environmental allergies. Does not bruise/bleed easily. Visit Vitals  /88 (BP 1 Location: Right arm, BP Patient Position: At rest)   Pulse 88   Temp 98.4 °F (36.9 °C)   Resp 22   Ht 5' 10\" (1.778 m)   Wt 140.6 kg (309 lb 15.5 oz)   SpO2 94%   BMI 44.48 kg/m²     Physical Exam  Constitutional:       Appearance: He is obese. Interventions: Nasal cannula in place.    HENT: Head: Normocephalic and atraumatic. Nose: Nose normal.   Eyes:      General: No scleral icterus. Conjunctiva/sclera: Conjunctivae normal.   Cardiovascular:      Rate and Rhythm: Normal rate and regular rhythm. Heart sounds: Normal heart sounds. No murmur heard. No gallop. Pulmonary:      Effort: Pulmonary effort is normal. No respiratory distress. Breath sounds: Normal breath sounds. No stridor. No wheezing. Abdominal:      General: There is no distension. Palpations: Abdomen is soft. Musculoskeletal:         General: No deformity. Normal range of motion. Skin:     General: Skin is warm and dry. Neurological:      General: No focal deficit present. Mental Status: He is alert.        Lab review:  BMP:   Lab Results   Component Value Date/Time     (L) 10/26/2022 01:07 AM    K 3.9 10/26/2022 01:07 AM    CL 95 (L) 10/26/2022 01:07 AM    CO2 33 (H) 10/26/2022 01:07 AM    AGAP 5 10/26/2022 01:07 AM     (H) 10/26/2022 01:07 AM    BUN 17 10/26/2022 01:07 AM    CREA 1.26 10/26/2022 01:07 AM        CBC:  Lab Results   Component Value Date/Time    WBC 7.3 10/26/2022 01:07 AM    Hemoglobin (POC) 12.9 12/04/2014 12:44 PM    HGB 10.4 (L) 10/26/2022 01:07 AM    Hematocrit (POC) 38 12/04/2014 12:44 PM    HCT 36.6 10/26/2022 01:07 AM    PLATELET 189 17/06/7743 01:07 AM    MCV 79.7 (L) 10/26/2022 01:07 AM       All Cardiac Markers in the last 24 hours:    Lab Results   Component Value Date/Time    BNPNT 4,293 (H) 10/25/2022 12:40 PM           Data review:  EKG tracing personally reviewed: MERY JONES    Echocardiogram:  pending    Other imaging:  CXR: cardiomegaly with central congestion    Assessment & Recommendations / Plan:    Acute on chronic systolic heart failure  Exacerbation due to non-compliance with medications and follow-up.    -> continue IV lasix for now  -> monitor Is and Os, fluid restriction 1500ml  -> monitor renal function  -> resume coreg  -> resume Entresto  -> resume Marieldiance    Not currently a candidate for advanced CHF therapies given non-compliance with follow-up and medications, morbid obesity and poor social support system. Non-ischemic cardiomyopathy  Chronic. Repeat echo pending. Non-MI troponin elevation secondary to CHF    Hypertensive heart disease with chronic systolic CHF    Non-compliance as above    Once feeling better, can be discharged and will need appointment with [address] [place] [address] Cardiology, Dr Amy Noguera. Discussed the importance of long term follow-up. Signed:  Yanet ROBLEDO  [facility]  Interventional Cardiology  10/26/22

## 2022-10-26 NOTE — PROGRESS NOTES
Lovenox Monitoring  Indication: DVT Prophylaxis  Recent Labs     10/26/22  0107 10/25/22  1240   HGB 10.4* 10.3*    316   CREA 1.26 1.43*     Current Weight: 140.6 kg  Est. CrCl = 105.8 ml/min  Current Dose: 40 mg subcutaneously every 24 hours. Plan: Change to 30 mg every 12 hours per protocol for patient 101-105.9 kg.

## 2022-10-26 NOTE — PROGRESS NOTES
TRANSITION OF CARE  RUR--8%  Disposition--Home with family assist.  PT and OT evaluations are pending. Transport--Family    Patient's primary family contact: mother Isabela Garcia    Note: Plan made for patient to contact his [employer] medicaid  to obtain a PCP in [facility] with his insurance. Note: Patient plans to call his [employer]  to discuss possible eligibilty for [place] [employer]. Care Management Interventions  PCP Verified by CM: No  Transition of Care Consult (CM Consult): Other  Physical Therapy Consult: Yes  Occupational Therapy Consult: Yes  Speech Therapy Consult: No  Support Systems: Parent(s)  Confirm Follow Up Transport: Family  The Plan for Transition of Care is Related to the Following Treatment Goals : Home with family assistance. Discharge Location  Patient Expects to be Discharged to[de-identified] Home with family assistance    Reason for Admission:  Acute on chronic CHF                        Fever with possible aspiration PNA                                 Morbid Obesity (3909 lbs)                   RUR Score:             8%           Plan for utilizing home health:        TBD    PCP: First and Last name:  None  Note: patient has a cardiologist at [facility]. Name of Practice:    Are you a current patient: Yes    Approximate date of last visit:    Can you participate in a virtual visit with your PCP:                     Current Advanced Directive/Advance Care Plan: Full Code    Healthcare Decision Maker:   Click here to complete [address] including selection of the Healthcare Decision Maker Relationship (ie \"Primary\")                         Transition of Care Plan:     CM met with patient. Patient  has a cousin in poor health who lives with him. He lives in a one story home. Patient has  mother and 5 supportive adult children as well as extended family who live locally. Patient reports good family /social support.  Patient is ambulatory and expects return to independent functioning. Patient confirmed health insurance and prescription coverage.    Previous home health : None  Previous IPR/ SNF rehab : None  DME : cane

## 2022-10-26 NOTE — NURSE NAVIGATOR
HEART FAILURE NURSE NAVIGATOR NOTE  [address]    Patient chart was reviewed by Heart Failure (HF) Nurse Navigators for compliance of prescribed treatment with guidelines directed medical therapy (GDMT) and HF database completed. Please, review beneath recommendations for symptomatic patients with HF with Reduced Ejection Fraction ? 40% (HFrEF)* and patients whose LVEF improved > 40% (HFimpEF)* for your consideration when taking care of this patient. Current Medical Therapy:    Name Amari MARAVILLA 1980   LVEF Prior echo 20%; repeat echo pending   NYHA Functional Class IV on admission   ARNi/ACEi/ARB Entresto 49-51 mg twice daily   Beta-blocker Coreg 3.125 mg twice daily   Aldosterone Antagonist [place] for elevated creatinine   SGLT2 inhibitor Jardiance 10 mg daily   Hydralazine/Isosorbide Dinitrate Not currently prescribed, see recommendations below   Consulting Cardiologist: Dr. Garret Veronica ([facility]). Pt is [facility] by [facility] cardiolgy Dr. Mandy Nunez. Recommendations:    Please, add the following GDMT for HFrEF ? 40% [Class 1] or document in discharge summary/progress note why patient cannot take the medication:  ARNi/ACEi or ARB  Beta-blockers (carvedilol, sustained-release metoprolol succinate or bisoprolol)  Aldosterone antagonists GFR > 30 and K< 5  SGLT2 inhibitor  Hydralazine/Isosorbide dinitrate for  Americans with Class III/IV symptoms  Adjust diuretic dose at discharge if hospitalized for volume overload    Consider adding the following GDMT for HFrEF ? 40%, if appropriate [Class 2b]:   Ivabradine for patients on maximally tolerated beta-blocker dose in order to achieve HR 70-80bpm  Digoxin, goal level 0.5-0.9  Polyunsaturated fatty acids  Vericuguat  For patient with hyperkalemia while on RAASi > 5.5, consider adding potassium binders (patiromer, sodium zirconium cycosilicate)    Note: the following medications may be potentially harmful in heart failure [Class 3]:  Calcium channel blockers (doxazosin, diltiazem, verapamil, nifedipine)  Antiarrhythmics (flecanide, disopyrimide, sotalol, dronedarone)  Diabetes medications (thiasolidinediones, saxagliptin, alogliptin)  NSAIDs and ROSENTHAL 2 inhibitors    Consider vaccinations for respiratory illnesses (flu, pneumonia, covid) [Class 2b]    For eligible patients with LVEF < 35% consider discharge with wearable defibrillation [Class 2b] and/or referral for ICD implantation [Class 1] for prevention of sudden cardiac death. Due to severely reduced LVEF < 25% and/or recurrent hospitalizations this patient may benefit from referral to Advanced Heart Failure Program to assess suitability for advanced therapies, such as left-ventricular assist device, heart transplantation, palliative inotropes or palliation [Class 1]. To obtain in-patient consultation or refer to [address] [place] [address], call [phone]    Patient Education:     Teach back in heart failure education provided, including information about medical therapy, lifestyle modifications, diet and fluid restrictions, physical activity. Educational resources provided: Living with Heart Failure booklet; Signs/Symptoms magnet; Weight Calendar; [facility] flyer; Preparation for Successful Discharge Checklist.  Information provided about HF support group. Heart failure avoiding triggers on discharge instructions. Plan for Transitional Care:    Post discharge follow up phone call to be made within 48-72 hours of discharge. Patient will follow-up with PCP. Patient will follow-up with Primary Cardiologist.  Obstructive sleep apnea screening done and patient was referred to Sleep Medicine. Referral/follow-up with Cardiac Rehabilitation.   Referral/follow-up with Advanced Heart Failure Specialist.  Referral/follow-up with Palliative Care Specialist.      Heart Failure Nurse Navigator  Phone: [phone]  /  [phone]    *Recommendations listed above are based on 2022 AHA/ACC/HFSA Guideline for the Management of Heart Failure: A Report of the [address] on Clinical Practice Guidelines. Circulation 2022; [id number]. and 2017 AHA/ACC/HRS guideline for management of patients with ventricular arrhythmias and the prevention of sudden cardiac death: A Report of the [employer] of Cardiology/American Heart Association Task Force on Clinical Practice Guidelines and the Heart Rhythm Society.  Heart Rhythm, Vol 15, No 10, October 2018 *Class of Recommendation: Class 1 (strong), Class 2a (moderate), Class 2b (weak), Class 3 (not recommended, potentially harmful)

## 2022-10-26 NOTE — PROGRESS NOTES
Pharmacy Note     Zosyn 3.375 gm IV q8h ordered for treatment of aspiration pneumonia. Per [address] / Extended Infusion B Lactam Policy, Zosyn will be changed to 4.5 gm IV once then 4.5 gm IV q8h. Estimated Creatinine Clearance: Estimated Creatinine Clearance: 105.8 mL/min (based on SCr of 1.26 mg/dL). Dialysis Status, ANH, CKD: n/a    BMI:  Body mass index is 44.48 kg/m². Recent Labs     10/26/22  0107 10/25/22  1240   WBC 7.3 9.0     Temp (24hrs), Av.2 °F (37.3 °C), Min:98.1 °F (36.7 °C), Max:100.6 °F (38.1 °C)      Rationale for Adjustment: Extended infusion dosing strategy aims to enhance microbiology and clinical efficacy. Pharmacy will continue to monitor and adjust dose as necessary. Please call with any questions.     Thank you,  Jaquan Petit, PHARMD

## 2022-10-26 NOTE — PROGRESS NOTES
Problem: Heart Failure: Day 1  Goal: Activity/Safety  Outcome: Progressing Towards Goal  Goal: Consults, if ordered  Outcome: Progressing Towards Goal  Goal: Diagnostic Test/Procedures  Outcome: Progressing Towards Goal  Goal: Nutrition/Diet  Outcome: Progressing Towards Goal  Goal: Medications  Outcome: Progressing Towards Goal

## 2022-10-27 ENCOUNTER — APPOINTMENT (OUTPATIENT)
Dept: NON INVASIVE DIAGNOSTICS | Age: 42
DRG: 194 | End: 2022-10-27
Attending: INTERNAL MEDICINE
Payer: MEDICAID

## 2022-10-27 LAB
ANION GAP SERPL CALC-SCNC: 5 MMOL/L (ref 5–15)
BUN SERPL-MCNC: 22 MG/DL (ref 6–20)
BUN/CREAT SERPL: 14 (ref 12–20)
CALCIUM SERPL-MCNC: 8.4 MG/DL (ref 8.5–10.1)
CHLORIDE SERPL-SCNC: 96 MMOL/L (ref 97–108)
CO2 SERPL-SCNC: 33 MMOL/L (ref 21–32)
CREAT SERPL-MCNC: 1.56 MG/DL (ref 0.7–1.3)
ECHO AV AREA PEAK VELOCITY: 1.4 CM2
ECHO AV AREA PEAK VELOCITY: 2.4 CM2
ECHO AV MEAN GRADIENT: 14 MMHG
ECHO AV MEAN VELOCITY: 1.8 M/S
ECHO AV PEAK GRADIENT: 25 MMHG
ECHO AV PEAK GRADIENT: 9 MMHG
ECHO AV PEAK VELOCITY: 1.5 M/S
ECHO AV PEAK VELOCITY: 2.5 M/S
ECHO AV VTI: 43.1 CM
ECHO LA DIAMETER INDEX: 1.87 CM/M2
ECHO LA DIAMETER: 4.8 CM
ECHO LV E' LATERAL VELOCITY: 6 CM/S
ECHO LV E' SEPTAL VELOCITY: 8 CM/S
ECHO LV FRACTIONAL SHORTENING: 11 % (ref 28–44)
ECHO LV INTERNAL DIMENSION DIASTOLE INDEX: 2.57 CM/M2
ECHO LV INTERNAL DIMENSION DIASTOLIC: 6.6 CM (ref 4.2–5.9)
ECHO LV INTERNAL DIMENSION SYSTOLIC INDEX: 2.3 CM/M2
ECHO LV INTERNAL DIMENSION SYSTOLIC: 5.9 CM
ECHO LV IVSD: 2 CM (ref 0.6–1)
ECHO LV MASS 2D: 809.5 G (ref 88–224)
ECHO LV MASS INDEX 2D: 315 G/M2 (ref 49–115)
ECHO LV POSTERIOR WALL DIASTOLIC: 2.2 CM (ref 0.6–1)
ECHO LV RELATIVE WALL THICKNESS RATIO: 0.67
ECHO LVOT AREA: 4.2 CM2
ECHO LVOT DIAM: 2.3 CM
ECHO LVOT PEAK GRADIENT: 3 MMHG
ECHO LVOT PEAK VELOCITY: 0.8 M/S
ECHO MV A VELOCITY: 0.58 M/S
ECHO MV AREA PHT: 7.4 CM2
ECHO MV E DECELERATION TIME (DT): 102.3 MS
ECHO MV E VELOCITY: 0.66 M/S
ECHO MV E/A RATIO: 1.14
ECHO MV E/E' LATERAL: 11
ECHO MV E/E' RATIO (AVERAGED): 9.63
ECHO MV E/E' SEPTAL: 8.25
ECHO MV PRESSURE HALF TIME (PHT): 29.7 MS
ECHO PV MAX VELOCITY: 1 M/S
ECHO PV PEAK GRADIENT: 4 MMHG
ECHO RV FREE WALL PEAK S': 14 CM/S
ECHO RV INTERNAL DIMENSION: 4.1 CM
ECHO TV REGURGITANT MAX VELOCITY: 2.35 M/S
ECHO TV REGURGITANT PEAK GRADIENT: 22 MMHG
ERYTHROCYTE [DISTWIDTH] IN BLOOD BY AUTOMATED COUNT: 18.5 % (ref 11.5–14.5)
GLUCOSE SERPL-MCNC: 127 MG/DL (ref 65–100)
HCT VFR BLD AUTO: 38.5 % (ref 36.6–50.3)
HGB BLD-MCNC: 10.5 G/DL (ref 12.1–17)
MCH RBC QN AUTO: 22 PG (ref 26–34)
MCHC RBC AUTO-ENTMCNC: 27.3 G/DL (ref 30–36.5)
MCV RBC AUTO: 80.7 FL (ref 80–99)
NRBC # BLD: 0 K/UL (ref 0–0.01)
NRBC BLD-RTO: 0 PER 100 WBC
PLATELET # BLD AUTO: 323 K/UL (ref 150–400)
PMV BLD AUTO: 9.5 FL (ref 8.9–12.9)
POTASSIUM SERPL-SCNC: 3.4 MMOL/L (ref 3.5–5.1)
RBC # BLD AUTO: 4.77 M/UL (ref 4.1–5.7)
SODIUM SERPL-SCNC: 134 MMOL/L (ref 136–145)
WBC # BLD AUTO: 6.2 K/UL (ref 4.1–11.1)

## 2022-10-27 PROCEDURE — 74011000258 HC RX REV CODE- 258: Performed by: INTERNAL MEDICINE

## 2022-10-27 PROCEDURE — 74011000250 HC RX REV CODE- 250: Performed by: HOSPITALIST

## 2022-10-27 PROCEDURE — 97535 SELF CARE MNGMENT TRAINING: CPT | Performed by: OCCUPATIONAL THERAPIST

## 2022-10-27 PROCEDURE — 85027 COMPLETE CBC AUTOMATED: CPT

## 2022-10-27 PROCEDURE — 74011000250 HC RX REV CODE- 250: Performed by: INTERNAL MEDICINE

## 2022-10-27 PROCEDURE — 97161 PT EVAL LOW COMPLEX 20 MIN: CPT

## 2022-10-27 PROCEDURE — 74011250637 HC RX REV CODE- 250/637: Performed by: INTERNAL MEDICINE

## 2022-10-27 PROCEDURE — 80048 BASIC METABOLIC PNL TOTAL CA: CPT

## 2022-10-27 PROCEDURE — 36415 COLL VENOUS BLD VENIPUNCTURE: CPT

## 2022-10-27 PROCEDURE — 74011250636 HC RX REV CODE- 250/636: Performed by: HOSPITALIST

## 2022-10-27 PROCEDURE — 97116 GAIT TRAINING THERAPY: CPT

## 2022-10-27 PROCEDURE — 65270000046 HC RM TELEMETRY

## 2022-10-27 PROCEDURE — 74011250636 HC RX REV CODE- 250/636: Performed by: INTERNAL MEDICINE

## 2022-10-27 PROCEDURE — 74011250637 HC RX REV CODE- 250/637: Performed by: HOSPITALIST

## 2022-10-27 PROCEDURE — 97165 OT EVAL LOW COMPLEX 30 MIN: CPT | Performed by: OCCUPATIONAL THERAPIST

## 2022-10-27 PROCEDURE — C8929 TTE W OR WO FOL WCON,DOPPLER: HCPCS

## 2022-10-27 RX ORDER — POTASSIUM CHLORIDE 750 MG/1
40 TABLET, FILM COATED, EXTENDED RELEASE ORAL
Status: COMPLETED | OUTPATIENT
Start: 2022-10-27 | End: 2022-10-27

## 2022-10-27 RX ORDER — PANTOPRAZOLE SODIUM 40 MG/1
40 TABLET, DELAYED RELEASE ORAL
Status: DISCONTINUED | OUTPATIENT
Start: 2022-10-27 | End: 2022-11-03 | Stop reason: HOSPADM

## 2022-10-27 RX ORDER — TORSEMIDE 100 MG/1
100 TABLET ORAL 2 TIMES DAILY
Status: DISCONTINUED | OUTPATIENT
Start: 2022-10-27 | End: 2022-11-03 | Stop reason: HOSPADM

## 2022-10-27 RX ADMIN — PIPERACILLIN AND TAZOBACTAM 4.5 G: 4; .5 INJECTION, POWDER, FOR SOLUTION INTRAVENOUS at 00:38

## 2022-10-27 RX ADMIN — PANTOPRAZOLE SODIUM 40 MG: 40 TABLET, DELAYED RELEASE ORAL at 16:52

## 2022-10-27 RX ADMIN — POTASSIUM CHLORIDE 40 MEQ: 750 TABLET, FILM COATED, EXTENDED RELEASE ORAL at 16:52

## 2022-10-27 RX ADMIN — SACUBITRIL AND VALSARTAN 1 TABLET: 49; 51 TABLET, FILM COATED ORAL at 16:52

## 2022-10-27 RX ADMIN — PIPERACILLIN AND TAZOBACTAM 4.5 G: 4; .5 INJECTION, POWDER, FOR SOLUTION INTRAVENOUS at 16:48

## 2022-10-27 RX ADMIN — SODIUM CHLORIDE, PRESERVATIVE FREE 10 ML: 5 INJECTION INTRAVENOUS at 07:41

## 2022-10-27 RX ADMIN — IPRATROPIUM BROMIDE AND ALBUTEROL SULFATE 3 ML: .5; 3 SOLUTION RESPIRATORY (INHALATION) at 03:10

## 2022-10-27 RX ADMIN — SODIUM CHLORIDE, PRESERVATIVE FREE 10 ML: 5 INJECTION INTRAVENOUS at 16:54

## 2022-10-27 RX ADMIN — ENOXAPARIN SODIUM 30 MG: 100 INJECTION SUBCUTANEOUS at 08:53

## 2022-10-27 RX ADMIN — EMPAGLIFLOZIN 10 MG: 10 TABLET, FILM COATED ORAL at 08:53

## 2022-10-27 RX ADMIN — TORSEMIDE 100 MG: 100 TABLET ORAL at 16:52

## 2022-10-27 RX ADMIN — PERFLUTREN 1 ML: 6.52 INJECTION, SUSPENSION INTRAVENOUS at 11:09

## 2022-10-27 RX ADMIN — CARVEDILOL 3.12 MG: 3.12 TABLET, FILM COATED ORAL at 17:59

## 2022-10-27 RX ADMIN — ENOXAPARIN SODIUM 30 MG: 100 INJECTION SUBCUTANEOUS at 22:55

## 2022-10-27 RX ADMIN — SACUBITRIL AND VALSARTAN 1 TABLET: 49; 51 TABLET, FILM COATED ORAL at 22:56

## 2022-10-27 RX ADMIN — CARVEDILOL 3.12 MG: 3.12 TABLET, FILM COATED ORAL at 08:53

## 2022-10-27 RX ADMIN — PIPERACILLIN AND TAZOBACTAM 4.5 G: 4; .5 INJECTION, POWDER, FOR SOLUTION INTRAVENOUS at 08:53

## 2022-10-27 NOTE — CARDIO/PULMONARY
Cardiac rehab: following for elevated enzymes: Per cardiology:     Not currently a candidate for advanced CHF therapies given non-compliance with follow-up and medications, morbid obesity and poor social support system. Non-ischemic cardiomyopathy  Chronic. Repeat echo still pending.      Non-MI troponin elevation secondary to CHF

## 2022-10-27 NOTE — PROGRESS NOTES
Physical Therapy Note  10/27/2022    Order acknowledged and chart reviewed in prep for PT natalia. Attempted to see however currently undergoing bedside testing. Will follow back up later today as appropriate.     Thank you,  Hiro Templeton, PT, DPT

## 2022-10-27 NOTE — PROGRESS NOTES
Problem: Falls - Risk of  Goal: *Absence of Falls  Description: Document Osiel Mendieta Fall Risk and appropriate interventions in the flowsheet.   Outcome: Progressing Towards Goal  Note: Fall Risk Interventions:            Medication Interventions: Teach patient to arise slowly, Evaluate medications/consider consulting pharmacy, Assess postural VS orthostatic hypotension                   Problem: Patient Education: Go to Patient Education Activity  Goal: Patient/Family Education  Outcome: Progressing Towards Goal     Problem: Heart Failure: Day 2  Goal: Consults, if ordered  Outcome: Progressing Towards Goal  Goal: Diagnostic Test/Procedures  Outcome: Progressing Towards Goal  Goal: Nutrition/Diet  Outcome: Progressing Towards Goal  Goal: Medications  Outcome: Progressing Towards Goal  Goal: Respiratory  Outcome: Progressing Towards Goal     Problem: Patient Education: Go to Patient Education Activity  Goal: Patient/Family Education  Outcome: Progressing Towards Goal     Problem: Heart Failure: Day 2  Goal: Consults, if ordered  Outcome: Progressing Towards Goal     Problem: Heart Failure: Day 2  Goal: Diagnostic Test/Procedures  Outcome: Progressing Towards Goal

## 2022-10-27 NOTE — PROGRESS NOTES
OCCUPATIONAL THERAPY EVALUATION/DISCHARGE  Patient: Suzette Pain (42 y.o. male)  Date: 10/27/2022  Primary Diagnosis: CHF exacerbation ([facility] [address] [I50.9]       Precautions: Fall    ASSESSMENT  Based on the objective data described below, the patient presents with good safety awareness and no LOB following admission for CHF exacerbation. Pt does have difficulty reaching his feet for ADLs due to his body habitus and edema. Educated pt on energy conservation techniques using written handout and provided pt with hip kit for improved safety and independence. Pt demonstrated good understanding. VSS on 3L O2 throughout session. No further skilled OT required at this time. Current Level of Function (ADLs/self-care): mod I to min A    Functional Outcome Measure: The patient scored 80/100 on the Barthel Index outcome measure. Other factors to consider for discharge: see above     PLAN :  Recommend with staff: up with assist for line management    Recommendation for discharge: (in order for the patient to meet his/her long term goals)  No skilled occupational therapy/ follow up rehabilitation needs identified at this time. This discharge recommendation:  Has not yet been discussed the attending provider and/or case management    IF patient discharges home will need the following DME: provided with hip kit       SUBJECTIVE:   Patient stated My ex-wife is my caregiver sometimes especially for bathing.     OBJECTIVE DATA SUMMARY:   HISTORY:   Past Medical History:   Diagnosis Date    Asthma     Gastrointestinal disorder     ulcers    Hypertension      Past Surgical History:   Procedure Laterality Date    HX ORTHOPAEDIC      R hand repair       Prior Level of Function/Environment/Context: Mod I using cane. Drives.   ex-wife assists with bathing  Expanded or extensive additional review of patient history:   Home Situation  Home Environment: Apartment  # Steps to Enter: 0  One/Two Story Residence: One story  Living Alone: No  Support Systems: Other Family Member(s) (Cousin)  Patient Expects to be Discharged to[de-identified] Home with family assistance  Current DME Used/Available at Home: Cane, straight  Tub or Shower Type: Tub/Shower combination    Hand dominance: Right    EXAMINATION OF PERFORMANCE DEFICITS:  Cognitive/Behavioral Status:  Neurologic State: Alert  Orientation Level: Oriented X4  Cognition: Appropriate decision making; Follows commands  Perception: Appears intact  Perseveration: No perseveration noted  Safety/Judgement: Awareness of environment; Insight into deficits    Hearing: Auditory  Auditory Impairment: None    Vision/Perceptual:    Acuity: Able to read clock/calendar on wall without difficulty; Able to read employee name badge without difficulty; Impaired near vision (per pt needs glasses for close up)         Range of Motion:  AROM: Generally decreased, functional  PROM: Generally decreased, functional                      Strength:  Strength: Generally decreased, functional                Coordination:  Coordination: Within functional limits  Fine Motor Skills-Upper: Left Intact; Right Intact    Gross Motor Skills-Upper: Left Intact; Right Intact    Tone & Sensation:  Tone: Normal  Sensation: Impaired (per pt numbness in LEs with edema)                      Balance:  Sitting: Intact  Standing: Intact    Functional Mobility and Transfers for ADLs:  Bed Mobility:  Supine to Sit: Modified independent  Scooting: Modified independent    Transfers:  Sit to Stand: Modified independent  Stand to Sit: Modified independent  Bed to Chair: Modified independent  Bathroom Mobility: Modified independent  Toilet Transfer : Modified independent    ADL Assessment:  Feeding: Independent    Oral Facial Hygiene/Grooming: Modified Independent (standing at sink)    Bathing: Minimum assistance; Additional time;Assist x1 (seated- A to reach feet)         Upper Body Dressing: Modified independent    Lower Body Dressing: Minimum assistance; Additional time;Assist x1 (seated- A to reach feet)    Toileting: Modified independent                ADL Intervention and task modifications:  Patient instructed and indicated understanding energy conservation techniques to increase independence and safety during ADLs with visual handout provided. Cognitive Retraining  Safety/Judgement: Awareness of environment; Insight into deficits      Functional Measure:    Barthel Index:  Bathin  Bladder: 10  Bowels: 10  Groomin  Dressin  Feeding: 10  Mobility: 10  Stairs: 5  Toilet Use: 10  Transfer (Bed to Chair and Back): 15  Total: 80/100      The Barthel ADL Index: Guidelines  1. The index should be used as a record of what a patient does, not as a record of what a patient could do. 2. The main aim is to establish degree of independence from any help, physical or verbal, however minor and for whatever reason. 3. The need for supervision renders the patient not independent. 4. A patient's performance should be established using the best available evidence. Asking the patient, friends/relatives and nurses are the usual sources, but direct observation and common sense are also important. However direct testing is not needed. 5. Usually the patient's performance over the preceding 24-48 hours is important, but occasionally longer periods will be relevant. 6. Middle categories imply that the patient supplies over 50 per cent of the effort. 7. Use of aids to be independent is allowed. Score Interpretation (from [address] [place] [address])    Independent   60-79 Minimally independent   40-59 Partially dependent   20-39 Very dependent   <20 Totally dependent     -Royce Dee., Barthel, D.W. (1965). Functional evaluation: the Barthel Index. [address] [facility] ([address] (1997). The Barthel activities of daily living index: self-reporting versus actual performance in the old (> or = 75 years).  Journal of [address] [place] [address] 45(7), 14 [facility] CenterJoshuaJoshua, Marcelino Álvarez. (). Measuring the change in disability after inpatient rehabilitation; comparison of the responsiveness of the Barthel Index and Functional [place] Measure. Journal of Neurology, Neurosurgery, and Psychiatry, 66(4), 714-520. JUAREZ Spaulding, VINITA Bolden, & Chang Galindo M.A. (2004) Assessment of post-stroke quality of life in cost-effectiveness studies: The usefulness of the Barthel Index and the EuroQoL-5D. Quality of Life Research, 15, 500-61        Occupational Therapy Evaluation Charge Determination   History Examination Decision-Making   LOW Complexity : Brief history review  MEDIUM Complexity : 3-5 performance deficits relating to physical, cognitive , or psychosocial skils that result in activity limitations and / or participation restrictions MEDIUM Complexity : Patient may present with comorbidities that affect occupational performnce. Miniml to moderate modification of tasks or assistance (eg, physical or verbal ) with assesment(s) is necessary to enable patient to complete evaluation       Based on the above components, the patient evaluation is determined to be of the following complexity level: LOW   Pain Ratin/10    Activity Tolerance:   Fair, desaturates with exertion and requires oxygen, requires frequent rest breaks, and observed SOB with activity    After treatment patient left in no apparent distress:    Sitting in chair and Call bell within reach    COMMUNICATION/EDUCATION:   The patients plan of care was discussed with: Physical therapist and Registered nurse.      Thank you for this referral.  Ronaldo Anne OT  Time Calculation: 25 mins

## 2022-10-27 NOTE — PROGRESS NOTES
PHYSICAL THERAPY EVALUATION/DISCHARGE  Patient: Dagoberto Rangel (1 y.o. male)  Date: 10/27/2022  Primary Diagnosis: CHF exacerbation ([facility] [address] [I50.9]       Precautions:   Fall      ASSESSMENT  Based on the objective data described below, the patient presents at/near his baseline functional mobility level following admission for SOB and CHF exacerbation. At baseline, he reports that he lived at home with a cousin and was generally mod I with mobility, however recently began intermittently using a SPC and feels that he \"needs something with more support for bad days\". Ex-wife assists with bathing. No home O2 previously. Received pt supine in bed on 3L O2; O2 briefly removed while de-tangling lines and noted to quickly drop to the low 80s/upper 70s. Reapplied and quickly recovered to mid 90s. He was able to complete bed mobility and transfers without assist; demos good standing balance once up. Gait training completed over short distance in room without AD - demos generally slowed pace but no overt LOB, deviation, or difficulty with minimal challenge. Mild SOB but reports significantly improved. Provided education on energy conservation, activity pacing, home safety, and use of rollator if/as needed on his \"bad days\". Answered questions to satisfaction. No further acute care PT needs at this time, will sign off. Recommend continued mobility with RN staff to reduce risk of functional decline during admission - high risk for quick decline with relative immobility. .     Functional Outcome Measure: The patient scored 23/28 on the Tinetti outcome measure which is indicative of low falls risk. Other factors to consider for discharge: none; has good support     Further skilled acute physical therapy is not indicated at this time.      PLAN :  Recommendation for discharge: (in order for the patient to meet his/her long term goals)  No skilled physical therapy/ follow up rehabilitation needs identified at this time.    This discharge recommendation:  A follow-up discussion with the attending provider and/or case management is planned    IF patient discharges home will need the following DME: requesting bariatric rollator - will place order        SUBJECTIVE:   Patient stated Some times when I got no wind I need to hold onto stuff.     OBJECTIVE DATA SUMMARY:   HISTORY:    Past Medical History:   Diagnosis Date    Asthma     Gastrointestinal disorder     ulcers    Hypertension      Past Surgical History:   Procedure Laterality Date    HX ORTHOPAEDIC      R hand repair       Prior level of function: Lives at home with a cousin; mod I with mobility using a SPC; ex-wife assists as needed with some ADLs; no falls; no home O2  Personal factors and/or comorbidities impacting plan of care: PMHx    Home Situation  Home Environment: Apartment  # Steps to Enter: 0  One/Two Story Residence: One story  Living Alone: No  Support Systems: Other Family Member(s) (Cousin)  Patient Expects to be Discharged to[de-identified] Home with family assistance  Current DME Used/Available at Home: Cane, straight  Tub or Shower Type: Tub/Shower combination    EXAMINATION/PRESENTATION/DECISION MAKING:   Critical Behavior:  Neurologic State: Alert  Orientation Level: Oriented X4  Cognition: Appropriate decision making, Follows commands  Safety/Judgement: Awareness of environment, Insight into deficits  Hearing: Auditory  Auditory Impairment: None  Skin:    Edema:   Range Of Motion:  AROM: Generally decreased, functional  PROM: Generally decreased, functional      Strength:    Strength: Generally decreased, functional     Tone & Sensation:   Tone: Normal  Sensation: Impaired (per pt numbness in LEs with edema)         Coordination:  Coordination: Within functional limits  Vision:   Acuity: Able to read clock/calendar on wall without difficulty; Able to read employee name badge without difficulty; Impaired near vision (per pt needs glasses for close up)  Functional Mobility:  Bed Mobility:     Supine to Sit: Modified independent  Scooting: Modified independent    Transfers:  Sit to Stand: Modified independent  Stand to Sit: Modified independent  Bed to Chair: Modified independent     Balance:   Sitting: Intact  Standing: Intact    Ambulation/Gait Training:  Distance (ft): 50 Feet (ft)  Assistive Device: Gait belt  Ambulation - Level of Assistance: Supervision  Gait Description (WDL): Exceptions to WDL  Gait Abnormalities: Trunk sway increased  Base of Support: Widened  Speed/Michelle: Shuffled; Slow  Step Length: Right shortened;Left shortened    Functional Measure:  Tinetti test:    Sitting Balance: 1  Arises: 1  Attempts to Rise: 2  Immediate Standing Balance: 2  Standing Balance: 1  Nudged: 2  Eyes Closed: 1  Turn 360 Degrees - Continuous/Discontinuous: 1  Turn 360 Degrees - Steady/Unsteady: 1  Sitting Down: 1  Balance Score: 13 Balance total score  Indication of Gait: 1  R Step Length/Height: 1  L Step Length/Height: 1  R Foot Clearance: 1  L Foot Clearance: 1  Step Symmetry: 1  Step Continuity: 1  Path: 1  Trunk: 1  Walking Time: 1  Gait Score: 10 Gait total score  Total Score: 23/28 Overall total score         Tinetti Tool Score Risk of Falls  <19 = High Fall Risk  19-24 = Moderate Fall Risk  25-28 = Low Fall Risk  Tinetti ME. Performance-Oriented Assessment of Mobility Problems in Elderly Patients. [address]; [id number].  (Scoring Description: PT Bulletin Feb. 10, 1993)    Older adults: Heyward Goodell et al, 2009; n = 1000 [facility] elderly evaluated with ABC, SHAMA, ADL, and IADL)  · Mean SHAMA score for males aged 69-68 years = 26.21(3.40)  · Mean SHAMA score for females age 69-68 years = 25.16(4.30)  · Mean SHAMA score for males over 80 years = 23.29(6.02)  · Mean SHAMA score for females over 80 years = 17.20(8.32)            Physical Therapy Evaluation Charge Determination   History Examination Presentation Decision-Making   MEDIUM  Complexity : 1-2 comorbidities / personal factors will impact the outcome/ POC  LOW Complexity : 1-2 Standardized tests and measures addressing body structure, function, activity limitation and / or participation in recreation  MEDIUM Complexity : Evolving with changing characteristics  LOW Complexity : FOTO score of       Based on the above components, the patient evaluation is determined to be of the following complexity level: LOW     Pain Rating:  none    Activity Tolerance:   Good, desaturates with exertion and requires oxygen, requires rest breaks, and observed SOB with activity      After treatment patient left in no apparent distress:   Sitting in chair and Call bell within reach    COMMUNICATION/EDUCATION:   The patients plan of care was discussed with: Registered nurse. Fall prevention education was provided and the patient/caregiver indicated understanding.     Thank you for this referral.  Rohit Alexis PT, DPT   Time Calculation: 29 mins

## 2022-10-27 NOTE — PROGRESS NOTES
[facility]  Hospitalist Group                                                                                          Hospitalist Progress Note  Dayle Najjar, MD  Answering service: [phone] from in house phone        Date of Service:  10/27/2022  NAME:  Omar Gorman  :  1980  MRN:  [id number]      Admission Summary:     A 38year old male patient with PMH of CHF with EF 20%, HTN presented to ED for evaluation of worsening sob. Patient has been non complain with his medications - he only takes torsemide and stopped taking other meds as he feels that he gets amnesia/ memory impairment. He has been noticing worsening sob since 1 month. He was not able to sleep. + PND and orthopnea. He c/o worsening edema of legs and abdominal distension. He does have chronic productive cough. He also states that he has on/off chest pains. He denied fever, abd pain, urinary or bowel changes. In ED, pro BNP elevated , IV lasix 80mg given. Hospitalist consulted for admission  10/25/2022     Interval history / Subjective:     Patient is sitting on the chair, sleepy but wakeful to voice, he said breathing the same, no left-sided chest pain, palpitation or diaphoresis     Assessment & Plan:     Acute on chronic systolic and diastolic CHF NYHA class IV on poa  Acute hypoxic respiratory failure   - pro BNP 4293  - CT: Mild groundglass opacity which may represent mild pulmonary edema    - echo on 10/27/2022 severely reduced left ventricular systolic function with estimated EF 20 to 25%, left ventricle is moderately dilated, severe septal thickening. Finding consistent with severe concentric hypertrophy.   Severe global hypokinesis present  -Monitor strict I&Os , daily weight   -IV Lasix switched to oral torsemide 100 mg p.o. twice daily, on Coreg, Entresto, and empagliflozin  - hold aldactone   - venous duplex: no DVT   - SpO2 % on 3 l/m, monitor pulse ox, wean down oxygen  -Cardiologist on board     Fever on 10/26 possible due to aspiration PNA   - normal lactic, procalcitonin  - resp panel neg  - CTA chest: no PE. Small nodular airspace opacities in the right lower lobe may reflect aspiration or infection   - started on zosyn      ANH possibly prerenal  - cr 1.43 on poa - cr improved   -Avoid nephrotoxin   - monitor renal function while patient on diuretics     HTN  -Blood pressure normal, continue with home meds coreg, entresto, and monitor blood pressure     Morbid obesity   -Body mass index is 44.48 kg/m². -Recommend weight loss      Suspect YONATAN-  - sleep study as outpt        Code status: Full code  Prophylaxis: Lovenox  Care Plan discussed with: Patient, nurse and CM  Anticipated Disposition: Home with home health care service in the next 24 to 48 hours     Hospital Problems  Never Reviewed            Codes Class Noted POA    CHF exacerbation ([facility] [address] ICD-10-CM: I50.9  ICD-9-CM: 428.0  10/25/2022 Unknown           Vital Signs:    Last 24hrs VS reviewed since prior progress note. Most recent are:  Visit Vitals  /75 (BP 1 Location: Left upper arm, BP Patient Position: Reclining;Sitting)   Pulse 81   Temp 97.8 °F (36.6 °C)   Resp 21   Ht 5' 10\" (1.778 m)   Wt 148 kg (326 lb 4.5 oz)   SpO2 98%   BMI 46.82 kg/m²         Intake/Output Summary (Last 24 hours) at 10/27/2022 1647  Last data filed at 10/27/2022 0600  Gross per 24 hour   Intake 1080 ml   Output 3525 ml   Net -2445 ml        Physical Examination:     I had a face to face encounter with this patient and independently examined them on 10/27/2022 as outlined below:          Constitutional:  No acute distress, cooperative, pleasant    ENT:  Oral mucosa moist, oropharynx benign. Resp: Decreased bronchial breath sounds to auscultation bilaterally. No wheezing/rhonchi/rales. No accessory muscle use. CV:  Regular rhythm, normal rate, no murmurs, gallops, rubs    GI:  Soft, non distended, non tender.  normoactive bowel sounds, no hepatosplenomegaly     Musculoskeletal: Bilateral pretibial and pedal.    Neurologic: Conscious in the last ,moves all extremities. AAOx3, CN II-XII reviewed            Data Review:    Review and/or order of clinical lab test  Review and/or order of tests in the radiology section of CPT  Review and/or order of tests in the medicine section of CPT      Labs:     Recent Labs     10/27/22  0226 10/26/22  0107   WBC 6.2 7.3   HGB 10.5* 10.4*   HCT 38.5 36.6    309     Recent Labs     10/27/22  0226 10/26/22  0107 10/25/22  1240   * 133* 133*   K 3.4* 3.9 3.5   CL 96* 95* 93*   CO2 33* 33* 31   BUN 22* 17 11   CREA 1.56* 1.26 1.43*   * 120* 118*   CA 8.4* 7.9* 8.3*     Recent Labs     10/25/22  1240   ALT 15   AP 53   TBILI 1.0   TP 9.6*   ALB 2.8*   GLOB 6.8*     No results for input(s): INR, PTP, APTT, INREXT in the last 72 hours. No results for input(s): FE, TIBC, PSAT, FERR in the last 72 hours. No results found for: FOL, RBCF   No results for input(s): PH, PCO2, PO2 in the last 72 hours. No results for input(s): CPK, CKNDX, TROIQ in the last 72 hours.     No lab exists for component: CPKMB  No results found for: CHOL, CHOLX, CHLST, CHOLV, HDL, HDLP, LDL, LDLC, DLDLP, TGLX, TRIGL, TRIGP, CHHD, CHHDX  Lab Results   Component Value Date/Time    Glucose (POC) 99 12/04/2014 12:44 PM     No results found for: COLOR, APPRN, SPGRU, REFSG, CORINNA, PROTU, GLUCU, KETU, BILU, UROU, LALO, LEUKU, GLUKE, EPSU, BACTU, WBCU, RBCU, CASTS, UCRY      Medications Reviewed:     Current Facility-Administered Medications   Medication Dose Route Frequency    torsemide (DEMADEX) tablet 100 mg  100 mg Oral BID    potassium chloride SR (KLOR-CON 10) tablet 40 mEq  40 mEq Oral NOW    pantoprazole (PROTONIX) tablet 40 mg  40 mg Oral ACB    enoxaparin (LOVENOX) injection 30 mg  30 mg SubCUTAneous Q12H    piperacillin-tazobactam (ZOSYN) 4.5 g in 0.9% sodium chloride (MBP/ADV) 100 mL MBP  4.5 g IntraVENous Q8H sodium chloride (NS) flush 5-40 mL  5-40 mL IntraVENous Q8H    sodium chloride (NS) flush 5-40 mL  5-40 mL IntraVENous PRN    acetaminophen (TYLENOL) tablet 650 mg  650 mg Oral Q6H PRN    Or    acetaminophen (TYLENOL) suppository 650 mg  650 mg Rectal Q6H PRN    polyethylene glycol (MIRALAX) packet 17 g  17 g Oral DAILY PRN    ondansetron (ZOFRAN ODT) tablet 4 mg  4 mg Oral Q8H PRN    Or    ondansetron (ZOFRAN) injection 4 mg  4 mg IntraVENous Q6H PRN    carvediloL (COREG) tablet 3.125 mg  3.125 mg Oral BID    sacubitriL-valsartan (ENTRESTO) 49-51 mg tablet 1 Tablet  1 Tablet Oral Q12H    empagliflozin (JARDIANCE) tablet 10 mg  10 mg Oral DAILY    albuterol-ipratropium (DUO-NEB) 2.5 MG-0.5 MG/3 ML  3 mL Nebulization Q4H PRN     ______________________________________________________________________  EXPECTED LENGTH OF STAY: 3d 19h  ACTUAL LENGTH OF STAY:          2                 Cem Bobo MD

## 2022-10-27 NOTE — PROGRESS NOTES
[facility] Cardiology Progress Note    Date of service: 10/27/2022    Subjective:   Breathing improved  Slept better last night    Objective:    Visit Vitals  BP (!) 135/96 (BP 1 Location: Right arm, BP Patient Position: At rest)   Pulse 90   Temp 97.8 °F (36.6 °C)   Resp 18   Ht 5' 10\" (1.778 m)   Wt 148.8 kg (328 lb 0.7 oz)   SpO2 100%   BMI 47.07 kg/m²       Physical Exam  Constitutional:       Appearance: He is well-developed. He is obese. Interventions: Nasal cannula in place. HENT:      Head: Normocephalic and atraumatic. Eyes:      General: No scleral icterus. Conjunctiva/sclera: Conjunctivae normal.   Neck:      Vascular: No JVD. Cardiovascular:      Rate and Rhythm: Normal rate and regular rhythm. Heart sounds: Normal heart sounds. No murmur heard. No gallop. Pulmonary:      Effort: Pulmonary effort is normal. No respiratory distress. Breath sounds: No stridor. Wheezing present. No rales. Abdominal:      General: There is no distension. Palpations: Abdomen is soft. Musculoskeletal:         General: No deformity. Right lower leg: Edema present. Left lower leg: Edema present. Skin:     General: Skin is warm and dry. Neurological:      Mental Status: He is alert and oriented to person, place, and time.    Psychiatric:         Behavior: Behavior normal.       Data reviewed:  Recent Results (from the past 12 hour(s))   CBC W/O DIFF    Collection Time: 10/27/22  2:26 AM   Result Value Ref Range    WBC 6.2 4.1 - 11.1 K/uL    RBC 4.77 4.10 - 5.70 M/uL    HGB 10.5 (L) 12.1 - 17.0 g/dL    HCT 38.5 36.6 - 50.3 %    MCV 80.7 80.0 - 99.0 FL    MCH 22.0 (L) 26.0 - 34.0 PG    MCHC 27.3 (L) 30.0 - 36.5 g/dL    RDW 18.5 (H) 11.5 - 14.5 %    PLATELET 868 478 - 482 K/uL    MPV 9.5 8.9 - 12.9 FL    NRBC 0.0 0  WBC    ABSOLUTE NRBC 0.00 0.00 - 0.55 K/uL   METABOLIC PANEL, BASIC    Collection Time: 10/27/22  2:26 AM   Result Value Ref Range    Sodium 134 (L) 136 - 145 mmol/L    Potassium 3.4 (L) 3.5 - 5.1 mmol/L    Chloride 96 (L) 97 - 108 mmol/L    CO2 33 (H) 21 - 32 mmol/L    Anion gap 5 5 - 15 mmol/L    Glucose 127 (H) 65 - 100 mg/dL    BUN 22 (H) 6 - 20 MG/DL    Creatinine 1.56 (H) 0.70 - 1.30 MG/DL    BUN/Creatinine ratio 14 12 - 20      eGFR 56 (L) >60 ml/min/1.73m2    Calcium 8.4 (L) 8.5 - 10.1 MG/DL     CTA Chest  1. No evidence of pulmonary embolus. 2.  Mild groundglass opacity which may represent mild pulmonary edema. Cardiomegaly is noted. 3.  Small nodular airspace opacities in the right lower lobe may reflect  aspiration or infection. Assessment and Plan:    Acute on chronic systolic heart failure  Exacerbation due to non-compliance with medications and follow-up. Symptoms improved     -> change diuretic to Torsemide 100mg PO bid  -> monitor Is and Os, fluid restriction 1500ml  -> monitor renal function  -> continue coreg  -> continue Entresto  -> continue Jardiance     Not currently a candidate for advanced CHF therapies given non-compliance with follow-up and medications, morbid obesity and poor social support system. Non-ischemic cardiomyopathy  Chronic. Repeat echo still pending. Non-MI troponin elevation secondary to CHF     Hypertensive heart disease with chronic systolic CHF  BP improved     Non-compliance as above     Once feeling better, can be discharged and will need appointment with [facility] Cardiology, Dr Memo Osullivan. Discussed the importance of long term follow-up. Signed:  Miguel Angel Guerrero MD  Interventional Cardiology  10/27/2022

## 2022-10-28 LAB
ALBUMIN SERPL-MCNC: 2.4 G/DL (ref 3.5–5)
ALBUMIN/GLOB SERPL: 0.4 {RATIO} (ref 1.1–2.2)
ALP SERPL-CCNC: 41 U/L (ref 45–117)
ALT SERPL-CCNC: 14 U/L (ref 12–78)
ANION GAP SERPL CALC-SCNC: 6 MMOL/L (ref 5–15)
AST SERPL-CCNC: 21 U/L (ref 15–37)
BILIRUB SERPL-MCNC: 0.3 MG/DL (ref 0.2–1)
BUN SERPL-MCNC: 19 MG/DL (ref 6–20)
BUN/CREAT SERPL: 14 (ref 12–20)
CALCIUM SERPL-MCNC: 7.8 MG/DL (ref 8.5–10.1)
CHLORIDE SERPL-SCNC: 98 MMOL/L (ref 97–108)
CO2 SERPL-SCNC: 32 MMOL/L (ref 21–32)
CREAT SERPL-MCNC: 1.34 MG/DL (ref 0.7–1.3)
ERYTHROCYTE [DISTWIDTH] IN BLOOD BY AUTOMATED COUNT: 18.6 % (ref 11.5–14.5)
GLOBULIN SER CALC-MCNC: 5.5 G/DL (ref 2–4)
GLUCOSE SERPL-MCNC: 123 MG/DL (ref 65–100)
HCT VFR BLD AUTO: 38.8 % (ref 36.6–50.3)
HEMOCCULT STL QL: POSITIVE
HGB BLD-MCNC: 10.3 G/DL (ref 12.1–17)
MCH RBC QN AUTO: 22.3 PG (ref 26–34)
MCHC RBC AUTO-ENTMCNC: 26.5 G/DL (ref 30–36.5)
MCV RBC AUTO: 84.2 FL (ref 80–99)
NRBC # BLD: 0 K/UL (ref 0–0.01)
NRBC BLD-RTO: 0 PER 100 WBC
PLATELET # BLD AUTO: 321 K/UL (ref 150–400)
PMV BLD AUTO: 9.7 FL (ref 8.9–12.9)
POTASSIUM SERPL-SCNC: 4 MMOL/L (ref 3.5–5.1)
PROT SERPL-MCNC: 7.9 G/DL (ref 6.4–8.2)
RBC # BLD AUTO: 4.61 M/UL (ref 4.1–5.7)
SODIUM SERPL-SCNC: 136 MMOL/L (ref 136–145)
WBC # BLD AUTO: 5.8 K/UL (ref 4.1–11.1)

## 2022-10-28 PROCEDURE — 74011250636 HC RX REV CODE- 250/636: Performed by: INTERNAL MEDICINE

## 2022-10-28 PROCEDURE — 74011250637 HC RX REV CODE- 250/637: Performed by: HOSPITALIST

## 2022-10-28 PROCEDURE — 74011000250 HC RX REV CODE- 250: Performed by: INTERNAL MEDICINE

## 2022-10-28 PROCEDURE — 74011250637 HC RX REV CODE- 250/637: Performed by: INTERNAL MEDICINE

## 2022-10-28 PROCEDURE — 65270000046 HC RM TELEMETRY

## 2022-10-28 PROCEDURE — 36415 COLL VENOUS BLD VENIPUNCTURE: CPT

## 2022-10-28 PROCEDURE — 82272 OCCULT BLD FECES 1-3 TESTS: CPT

## 2022-10-28 PROCEDURE — 80053 COMPREHEN METABOLIC PANEL: CPT

## 2022-10-28 PROCEDURE — 74011000258 HC RX REV CODE- 258: Performed by: INTERNAL MEDICINE

## 2022-10-28 PROCEDURE — 85027 COMPLETE CBC AUTOMATED: CPT

## 2022-10-28 RX ADMIN — PANTOPRAZOLE SODIUM 40 MG: 40 TABLET, DELAYED RELEASE ORAL at 06:54

## 2022-10-28 RX ADMIN — TORSEMIDE 100 MG: 100 TABLET ORAL at 17:21

## 2022-10-28 RX ADMIN — SACUBITRIL AND VALSARTAN 1 TABLET: 49; 51 TABLET, FILM COATED ORAL at 08:44

## 2022-10-28 RX ADMIN — PIPERACILLIN AND TAZOBACTAM 4.5 G: 4; .5 INJECTION, POWDER, FOR SOLUTION INTRAVENOUS at 16:18

## 2022-10-28 RX ADMIN — CARVEDILOL 3.12 MG: 3.12 TABLET, FILM COATED ORAL at 17:21

## 2022-10-28 RX ADMIN — ENOXAPARIN SODIUM 30 MG: 100 INJECTION SUBCUTANEOUS at 08:45

## 2022-10-28 RX ADMIN — CARVEDILOL 3.12 MG: 3.12 TABLET, FILM COATED ORAL at 08:44

## 2022-10-28 RX ADMIN — SODIUM CHLORIDE, PRESERVATIVE FREE 10 ML: 5 INJECTION INTRAVENOUS at 16:24

## 2022-10-28 RX ADMIN — TORSEMIDE 100 MG: 100 TABLET ORAL at 08:45

## 2022-10-28 RX ADMIN — SACUBITRIL AND VALSARTAN 1 TABLET: 49; 51 TABLET, FILM COATED ORAL at 21:45

## 2022-10-28 RX ADMIN — PIPERACILLIN AND TAZOBACTAM 4.5 G: 4; .5 INJECTION, POWDER, FOR SOLUTION INTRAVENOUS at 00:01

## 2022-10-28 RX ADMIN — PIPERACILLIN AND TAZOBACTAM 4.5 G: 4; .5 INJECTION, POWDER, FOR SOLUTION INTRAVENOUS at 08:44

## 2022-10-28 RX ADMIN — ENOXAPARIN SODIUM 30 MG: 100 INJECTION SUBCUTANEOUS at 21:47

## 2022-10-28 RX ADMIN — SODIUM CHLORIDE, PRESERVATIVE FREE 10 ML: 5 INJECTION INTRAVENOUS at 21:45

## 2022-10-28 RX ADMIN — EMPAGLIFLOZIN 10 MG: 10 TABLET, FILM COATED ORAL at 08:44

## 2022-10-28 NOTE — NURSE NAVIGATOR
HF NN contacted [facility] cardiology and scheduled first available appointment with Edie Lesch, NP on 11/21/22 at 1:00 PM. Details on AVS.

## 2022-10-28 NOTE — PROGRESS NOTES
Bedside shift change report given to [address] Ram[address]rez (oncoming nurse) by SHEREEN Foley (offgoing nurse). Report included the following information SBAR, Kardex, Procedure Summary, MAR, and Recent Results.

## 2022-10-28 NOTE — PROGRESS NOTES
[facility]  Hospitalist Group                                                                                          Hospitalist Progress Note  Phil Lang MD  Answering service: [phone] from in house phone        Date of Service:  10/28/2022  NAME:  Ronnie MARAVILLA:  1980  MRN:  [id number]      Admission Summary:     A 38year old male patient with PMH of CHF with EF 20%, HTN presented to ED for evaluation of worsening sob. Patient has been non complain with his medications - he only takes torsemide and stopped taking other meds as he feels that he gets amnesia/ memory impairment. He has been noticing worsening sob since 1 month. He was not able to sleep. + PND and orthopnea. He c/o worsening edema of legs and abdominal distension. He does have chronic productive cough. He also states that he has on/off chest pains. He denied fever, abd pain, urinary or bowel changes. In ED, pro BNP elevated , IV lasix 80mg given. Hospitalist consulted for admission  10/25/2022     Interval history / Subjective:     He said he is breathing better today, no left-sided chest pain  Oxygen drops on room air, currently on 3 l/m     Assessment & Plan:     Acute on chronic systolic and diastolic CHF NYHA class IV on poa  Acute hypoxic respiratory failure   - pro BNP 4293  - CT: Mild groundglass opacity which may represent mild pulmonary edema    - echo on 10/27/2022 severely reduced left ventricular systolic function with estimated EF 20 to 25%, left ventricle is moderately dilated, severe septal thickening. Finding consistent with severe concentric hypertrophy.   Severe global hypokinesis present  -Monitor strict I&Os , daily weight   -IV Lasix switched to oral torsemide 100 mg p.o. twice daily, on Coreg, Entresto, and empagliflozin  - hold aldactone   - venous duplex: no DVT   - SpO2 % on 3 l/m, monitor pulse ox, wean down oxygen  -Cardiologist on board     Fever on 10/26 possible due to aspiration PNA   - normal lactic, procalcitonin  - resp panel neg  - CTA chest: no PE. Small nodular airspace opacities in the right lower lobe may reflect aspiration or infection   -On IV zosyn      ANH possibly prerenal  - cr 1.43 on poa - cr improved to 1.34  -Avoid nephrotoxin   - monitor renal function while patient on diuretics     HTN  -Blood pressure normal, continue with home meds coreg, entresto, and monitor blood pressure     Morbid obesity   -Body mass index is 44.48 kg/m². -Recommend weight loss      Suspect YONATAN-  - sleep study as outpt        Code status: Full code  Prophylaxis: Lovenox  Care Plan discussed with: Patient, nurse and CM  Anticipated Disposition: Home with home health care service in the next 24 to 48 hours     Hospital Problems  Never Reviewed            Codes Class Noted POA    CHF exacerbation ([facility] [address] ICD-10-CM: I50.9  ICD-9-CM: 428.0  10/25/2022 Unknown         Vital Signs:    Last 24hrs VS reviewed since prior progress note. Most recent are:  Visit Vitals  /78 (BP 1 Location: Left upper arm, BP Patient Position: Sitting)   Pulse 71   Temp 98.7 °F (37.1 °C)   Resp 28   Ht 5' 10\" (1.778 m)   Wt 149.3 kg (329 lb 2.4 oz)   SpO2 (!) 75%   BMI 47.23 kg/m²         Intake/Output Summary (Last 24 hours) at 10/28/2022 1548  Last data filed at 10/28/2022 1053  Gross per 24 hour   Intake 954 ml   Output 2750 ml   Net -1796 ml          Physical Examination:     I had a face to face encounter with this patient and independently examined them on 10/28/2022 as outlined below:          Constitutional:  No acute distress, cooperative, pleasant    ENT:  Oral mucosa moist, oropharynx benign. Resp: Decreased bronchial breath sounds to auscultation bilaterally. No wheezing/rhonchi/rales. No accessory muscle use. CV:  Regular rhythm, normal rate, no murmurs, gallops, rubs    GI:  Soft, non distended, non tender.  normoactive bowel sounds, no hepatosplenomegaly Musculoskeletal: Bilateral pretibial and pedal.    Neurologic: Conscious in the last ,moves all extremities. AAOx3, CN II-XII reviewed            Data Review:    Review and/or order of clinical lab test  Review and/or order of tests in the radiology section of CPT  Review and/or order of tests in the medicine section of CPT      Labs:     Recent Labs     10/28/22  0002 10/27/22  0226   WBC 5.8 6.2   HGB 10.3* 10.5*   HCT 38.8 38.5    323       Recent Labs     10/28/22  0002 10/27/22  0226 10/26/22  0107    134* 133*   K 4.0 3.4* 3.9   CL 98 96* 95*   CO2 32 33* 33*   BUN 19 22* 17   CREA 1.34* 1.56* 1.26   * 127* 120*   CA 7.8* 8.4* 7.9*       Recent Labs     10/28/22  0002   ALT 14   AP 41*   TBILI 0.3   TP 7.9   ALB 2.4*   GLOB 5.5*       No results for input(s): INR, PTP, APTT, INREXT, INREXT in the last 72 hours. No results for input(s): FE, TIBC, PSAT, FERR in the last 72 hours. No results found for: FOL, RBCF   No results for input(s): PH, PCO2, PO2 in the last 72 hours. No results for input(s): CPK, CKNDX, TROIQ in the last 72 hours.     No lab exists for component: CPKMB  No results found for: CHOL, CHOLX, CHLST, CHOLV, HDL, HDLP, LDL, LDLC, DLDLP, TGLX, TRIGL, TRIGP, CHHD, CHHDX  Lab Results   Component Value Date/Time    Glucose (POC) 99 12/04/2014 12:44 PM     No results found for: COLOR, APPRN, SPGRU, REFSG, CORINNA, PROTU, GLUCU, KETU, BILU, UROU, LALO, LEUKU, GLUKE, EPSU, BACTU, WBCU, RBCU, CASTS, UCRY      Medications Reviewed:     Current Facility-Administered Medications   Medication Dose Route Frequency    torsemide (DEMADEX) tablet 100 mg  100 mg Oral BID    pantoprazole (PROTONIX) tablet 40 mg  40 mg Oral ACB    enoxaparin (LOVENOX) injection 30 mg  30 mg SubCUTAneous Q12H    piperacillin-tazobactam (ZOSYN) 4.5 g in 0.9% sodium chloride (MBP/ADV) 100 mL MBP  4.5 g IntraVENous Q8H    sodium chloride (NS) flush 5-40 mL  5-40 mL IntraVENous Q8H    sodium chloride (NS) flush 5-40 mL  5-40 mL IntraVENous PRN    acetaminophen (TYLENOL) tablet 650 mg  650 mg Oral Q6H PRN    Or    acetaminophen (TYLENOL) suppository 650 mg  650 mg Rectal Q6H PRN    polyethylene glycol (MIRALAX) packet 17 g  17 g Oral DAILY PRN    ondansetron (ZOFRAN ODT) tablet 4 mg  4 mg Oral Q8H PRN    Or    ondansetron (ZOFRAN) injection 4 mg  4 mg IntraVENous Q6H PRN    carvediloL (COREG) tablet 3.125 mg  3.125 mg Oral BID    sacubitriL-valsartan (ENTRESTO) 49-51 mg tablet 1 Tablet  1 Tablet Oral Q12H    empagliflozin (JARDIANCE) tablet 10 mg  10 mg Oral DAILY    albuterol-ipratropium (DUO-NEB) 2.5 MG-0.5 MG/3 ML  3 mL Nebulization Q4H PRN     ______________________________________________________________________  EXPECTED LENGTH OF STAY: 3d 19h  ACTUAL LENGTH OF STAY:          3                 Clark Mendoza MD

## 2022-10-28 NOTE — PROGRESS NOTES
[facility] Cardiology Progress Note    Date of service: 10/28/2022    Subjective:   Breathing improved  Slept better last night    Objective:    Visit Vitals  /84 (BP 1 Location: Left upper arm, BP Patient Position: Sitting)   Pulse 75   Temp 99.7 °F (37.6 °C)   Resp 18   Ht 5' 10\" (1.778 m)   Wt 149.3 kg (329 lb 2.4 oz)   SpO2 93%   BMI 47.23 kg/m²       Physical Exam  Constitutional:       Appearance: He is well-developed. He is obese. Interventions: Nasal cannula in place. HENT:      Head: Normocephalic and atraumatic. Eyes:      General: No scleral icterus. Conjunctiva/sclera: Conjunctivae normal.   Neck:      Vascular: No JVD. Cardiovascular:      Rate and Rhythm: Normal rate and regular rhythm. Heart sounds: Normal heart sounds. No murmur heard. No gallop. Pulmonary:      Effort: Pulmonary effort is normal. No respiratory distress. Breath sounds: No stridor. Wheezing present. No rales. Abdominal:      General: There is no distension. Palpations: Abdomen is soft. Musculoskeletal:         General: No deformity. Right lower leg: Edema present. Left lower leg: Edema present. Skin:     General: Skin is warm and dry. Neurological:      Mental Status: He is alert and oriented to person, place, and time.    Psychiatric:         Behavior: Behavior normal.       Data reviewed:  Recent Results (from the past 12 hour(s))   CBC W/O DIFF    Collection Time: 10/28/22 12:02 AM   Result Value Ref Range    WBC 5.8 4.1 - 11.1 K/uL    RBC 4.61 4.10 - 5.70 M/uL    HGB 10.3 (L) 12.1 - 17.0 g/dL    HCT 38.8 36.6 - 50.3 %    MCV 84.2 80.0 - 99.0 FL    MCH 22.3 (L) 26.0 - 34.0 PG    MCHC 26.5 (L) 30.0 - 36.5 g/dL    RDW 18.6 (H) 11.5 - 14.5 %    PLATELET 047 524 - 610 K/uL    MPV 9.7 8.9 - 12.9 FL    NRBC 0.0 0  WBC    ABSOLUTE NRBC 0.00 0.00 - 0.59 K/uL   METABOLIC PANEL, COMPREHENSIVE    Collection Time: 10/28/22 12:02 AM   Result Value Ref Range    Sodium 136 136 - 145 mmol/L    Potassium 4.0 3.5 - 5.1 mmol/L    Chloride 98 97 - 108 mmol/L    CO2 32 21 - 32 mmol/L    Anion gap 6 5 - 15 mmol/L    Glucose 123 (H) 65 - 100 mg/dL    BUN 19 6 - 20 MG/DL    Creatinine 1.34 (H) 0.70 - 1.30 MG/DL    BUN/Creatinine ratio 14 12 - 20      eGFR >60 >60 ml/min/1.73m2    Calcium 7.8 (L) 8.5 - 10.1 MG/DL    Bilirubin, total 0.3 0.2 - 1.0 MG/DL    ALT (SGPT) 14 12 - 78 U/L    AST (SGOT) 21 15 - 37 U/L    Alk. phosphatase 41 (L) 45 - 117 U/L    Protein, total 7.9 6.4 - 8.2 g/dL    Albumin 2.4 (L) 3.5 - 5.0 g/dL    Globulin 5.5 (H) 2.0 - 4.0 g/dL    A-G Ratio 0.4 (L) 1.1 - 2.2     OCCULT BLOOD, STOOL    Collection Time: 10/28/22  1:56 AM   Result Value Ref Range    Occult blood, stool Positive (A) NEG       CTA Chest  1. No evidence of pulmonary embolus. 2.  Mild groundglass opacity which may represent mild pulmonary edema. Cardiomegaly is noted. 3.  Small nodular airspace opacities in the right lower lobe may reflect  aspiration or infection. Assessment and Plan:    Acute on chronic systolic heart failure  Exacerbation due to non-compliance with medications and follow-up. Symptoms improved     -> continue loop diuretic Torsemide 100mg PO bid  -> monitor Is and Os, fluid restriction 1500ml  -> monitor renal function  -> continue coreg  -> continue Entresto  -> continue Jardiance     Not currently a candidate for advanced CHF therapies given non-compliance with follow-up and medications, morbid obesity and poor social support system. Non-ischemic cardiomyopathy  Chronic. Repeat echo still pending. Non-MI troponin elevation secondary to CHF     Hypertensive heart disease with chronic systolic CHF  BP improved     Non-compliance as above     From CV standpoint, he can be discharged and will need appointment with [facility] Cardiology, Dr Skyla Winn. Discussed the importance of long term follow-up. Will sign off at this time.  Available to assist as needed upon request if he remains inpatient    Signed:  Katelyn Mills.  Jacquelyn Razo MD  Interventional Cardiology  10/28/2022

## 2022-10-28 NOTE — PROGRESS NOTES
Transition of Care  RUR 9% Low  Disposition Home  DME Cane; new order for bariatric rollator  Transportation Family   Follow Up PCP, Specialist    CM received order for bariatric rollator. Order processed with [employer] for delivery to bedside. CM to monitor.      Kristy Tan MS

## 2022-10-28 NOTE — PROGRESS NOTES
Bedside and Verbal shift change report given to Gladys Glover RN (oncoming nurse) by Lilliana Jimenez RN (offgoing nurse). Report included the following information SBAR, Kardex, ED Summary, Procedure Summary, Intake/Output, MAR, Recent Results, and Cardiac Rhythm NSR . NEEDS NEW SCRIPT FOR amLODIPine (NORVASC) 5MG   PLEASE SEND OVER   PLEASE ADVISE   [phone]

## 2022-10-29 LAB
ERYTHROCYTE [DISTWIDTH] IN BLOOD BY AUTOMATED COUNT: 18.1 % (ref 11.5–14.5)
HCT VFR BLD AUTO: 35.2 % (ref 36.6–50.3)
HGB BLD-MCNC: 9.5 G/DL (ref 12.1–17)
MCH RBC QN AUTO: 22.2 PG (ref 26–34)
MCHC RBC AUTO-ENTMCNC: 27 G/DL (ref 30–36.5)
MCV RBC AUTO: 82.2 FL (ref 80–99)
NRBC # BLD: 0 K/UL (ref 0–0.01)
NRBC BLD-RTO: 0 PER 100 WBC
PLATELET # BLD AUTO: 315 K/UL (ref 150–400)
PMV BLD AUTO: 10 FL (ref 8.9–12.9)
RBC # BLD AUTO: 4.28 M/UL (ref 4.1–5.7)
WBC # BLD AUTO: 8.2 K/UL (ref 4.1–11.1)

## 2022-10-29 PROCEDURE — 74011250637 HC RX REV CODE- 250/637: Performed by: INTERNAL MEDICINE

## 2022-10-29 PROCEDURE — 74011000250 HC RX REV CODE- 250: Performed by: INTERNAL MEDICINE

## 2022-10-29 PROCEDURE — 65270000046 HC RM TELEMETRY

## 2022-10-29 PROCEDURE — 74011250637 HC RX REV CODE- 250/637: Performed by: HOSPITALIST

## 2022-10-29 PROCEDURE — 74011000258 HC RX REV CODE- 258: Performed by: INTERNAL MEDICINE

## 2022-10-29 PROCEDURE — 36415 COLL VENOUS BLD VENIPUNCTURE: CPT

## 2022-10-29 PROCEDURE — 74011250636 HC RX REV CODE- 250/636: Performed by: INTERNAL MEDICINE

## 2022-10-29 PROCEDURE — 85027 COMPLETE CBC AUTOMATED: CPT

## 2022-10-29 RX ORDER — MIDODRINE HYDROCHLORIDE 5 MG/1
5 TABLET ORAL
Status: DISCONTINUED | OUTPATIENT
Start: 2022-10-29 | End: 2022-10-30

## 2022-10-29 RX ADMIN — EMPAGLIFLOZIN 10 MG: 10 TABLET, FILM COATED ORAL at 09:21

## 2022-10-29 RX ADMIN — MIDODRINE HYDROCHLORIDE 5 MG: 5 TABLET ORAL at 13:36

## 2022-10-29 RX ADMIN — CARVEDILOL 3.12 MG: 3.12 TABLET, FILM COATED ORAL at 18:00

## 2022-10-29 RX ADMIN — TORSEMIDE 100 MG: 100 TABLET ORAL at 18:00

## 2022-10-29 RX ADMIN — PIPERACILLIN AND TAZOBACTAM 4.5 G: 4; .5 INJECTION, POWDER, FOR SOLUTION INTRAVENOUS at 09:23

## 2022-10-29 RX ADMIN — SACUBITRIL AND VALSARTAN 1 TABLET: 49; 51 TABLET, FILM COATED ORAL at 22:18

## 2022-10-29 RX ADMIN — SACUBITRIL AND VALSARTAN 1 TABLET: 49; 51 TABLET, FILM COATED ORAL at 09:20

## 2022-10-29 RX ADMIN — MIDODRINE HYDROCHLORIDE 5 MG: 5 TABLET ORAL at 09:22

## 2022-10-29 RX ADMIN — SODIUM CHLORIDE, PRESERVATIVE FREE 10 ML: 5 INJECTION INTRAVENOUS at 13:30

## 2022-10-29 RX ADMIN — MIDODRINE HYDROCHLORIDE 5 MG: 5 TABLET ORAL at 18:05

## 2022-10-29 RX ADMIN — PIPERACILLIN AND TAZOBACTAM 4.5 G: 4; .5 INJECTION, POWDER, FOR SOLUTION INTRAVENOUS at 18:00

## 2022-10-29 RX ADMIN — CARVEDILOL 3.12 MG: 3.12 TABLET, FILM COATED ORAL at 09:22

## 2022-10-29 RX ADMIN — TORSEMIDE 100 MG: 100 TABLET ORAL at 09:22

## 2022-10-29 RX ADMIN — PANTOPRAZOLE SODIUM 40 MG: 40 TABLET, DELAYED RELEASE ORAL at 09:21

## 2022-10-29 RX ADMIN — SODIUM CHLORIDE, PRESERVATIVE FREE 10 ML: 5 INJECTION INTRAVENOUS at 22:23

## 2022-10-29 RX ADMIN — PIPERACILLIN AND TAZOBACTAM 4.5 G: 4; .5 INJECTION, POWDER, FOR SOLUTION INTRAVENOUS at 00:15

## 2022-10-29 RX ADMIN — SODIUM CHLORIDE, PRESERVATIVE FREE 10 ML: 5 INJECTION INTRAVENOUS at 13:56

## 2022-10-29 NOTE — CONSULTS
[address]  [place], [facility] NOTE          NAME:  Suzette Olivia   :   1980   MRN:   [id number]         Consult Date: 10/29/2022 11:15 AM    Referring provider: Saulo Littlejohn    Chief Complaint: bleeding per rectum    History of Present Illness: 39 yo M w/ BMI 45, NICM w/ LVEF  20%, HTN, presented for further eval of dyspnea 2/2 CHF exacerbation, initially on BIPAP, but w/ diuresis and resumption of medical regimen has been doing well- GI consulted today after pt and RN noted significant amount of painless red blood per rectum along with a bowel movement. - he has tried to have another BM since and passed a small amount of red blood  - in talking with him, this has happened to him before over the years  - also endorses passing darker blood clots in the past that he attributes to an ulcer       PMH:  Past Medical History:   Diagnosis Date    Asthma     Gastrointestinal disorder     ulcers    Hypertension        PSH:  Past Surgical History:   Procedure Laterality Date    HX ORTHOPAEDIC      R hand repair       Allergies:  No Known Allergies    Home Medications:  Prior to Admission Medications   Prescriptions Last Dose Informant Patient Reported? Taking? Entresto 49-51 mg tab tablet  Self Yes Yes   Sig: TAKE 1 TABLET BY MOUTH TWICE DAILY. STOP TAKING LISINOPRIL   Farxiga 5 mg tab tablet 2022 Self Yes Yes   Sig: Take 5 mg by mouth daily. albuterol (PROVENTIL HFA, VENTOLIN HFA, PROAIR HFA) 90 mcg/actuation inhaler  Self No Yes   Sig: Take 2 Puffs by inhalation every six (6) hours as needed for Wheezing. carvediloL (COREG) 3.125 mg tablet 2022 Self Yes Yes   Sig: Take 3.125 mg by mouth two (2) times a day. fluticasone propionate (FLONASE) 50 mcg/actuation nasal spray  Self Yes Yes   Si Sprays by Both Nostrils route daily as needed.    spironolactone (ALDACTONE) 25 mg tablet  Self Yes Yes   Sig: Take 25 mg by mouth two (2) times a day.    torsemide (DEMADEX) 20 mg tablet  Self Yes Yes   Sig: TAKE 5 TABLETS BY MOUTH TWICE DAILY      Facility-Administered Medications: None       Hospital Medications:  Current Facility-Administered Medications   Medication Dose Route Frequency    midodrine (PROAMATINE) tablet 5 mg  5 mg Oral TID WITH MEALS    torsemide (DEMADEX) tablet 100 mg  100 mg Oral BID    pantoprazole (PROTONIX) tablet 40 mg  40 mg Oral ACB    [Held by provider] enoxaparin (LOVENOX) injection 30 mg  30 mg SubCUTAneous Q12H    piperacillin-tazobactam (ZOSYN) 4.5 g in 0.9% sodium chloride (MBP/ADV) 100 mL MBP  4.5 g IntraVENous Q8H    sodium chloride (NS) flush 5-40 mL  5-40 mL IntraVENous Q8H    sodium chloride (NS) flush 5-40 mL  5-40 mL IntraVENous PRN    acetaminophen (TYLENOL) tablet 650 mg  650 mg Oral Q6H PRN    Or    acetaminophen (TYLENOL) suppository 650 mg  650 mg Rectal Q6H PRN    polyethylene glycol (MIRALAX) packet 17 g  17 g Oral DAILY PRN    ondansetron (ZOFRAN ODT) tablet 4 mg  4 mg Oral Q8H PRN    Or    ondansetron (ZOFRAN) injection 4 mg  4 mg IntraVENous Q6H PRN    carvediloL (COREG) tablet 3.125 mg  3.125 mg Oral BID    sacubitriL-valsartan (ENTRESTO) 49-51 mg tablet 1 Tablet  1 Tablet Oral Q12H    empagliflozin (JARDIANCE) tablet 10 mg  10 mg Oral DAILY    albuterol-ipratropium (DUO-NEB) 2.5 MG-0.5 MG/3 ML  3 mL Nebulization Q4H PRN       Social History:  Social History     Tobacco Use    Smoking status: Every Day     Packs/day: 0.25     Types: Cigarettes    Smokeless tobacco: Not on file   Substance Use Topics    Alcohol use: No       Family History:  Family History   Problem Relation Age of Onset    Hypertension Mother        Review of Systems:  Constitutional: - fever, - chills, - weight loss  Eyes:   - visual changes  ENT:   - sore throat, - tongue or lip swelling  Respiratory:  - cough, - dyspnea  Cards:  - chest pain, - diaphoresis, - palpitations, - lower extremity edema  GI:   See HPI  :  - frequency, - dysuria  Integument:  - rash, - pruritus  Heme:  - easy bruising, - gum/nose bleeding  Musculoskel: - for myalgias,  - back pain, - muscle weakness  Neuro:  - headaches, - dizziness  Psych: - feelings of anxiety, - feelings of depression     Objective:   Patient Vitals for the past 8 hrs:   BP Temp Pulse Resp SpO2 Weight   10/29/22 1000 -- -- 67 -- -- --   10/29/22 0842 -- -- -- -- -- 152.1 kg (335 lb 5.1 oz)   10/29/22 0841 101/83 98.4 °F (36.9 °C) 79 19 98 % --   10/29/22 0559 -- -- 84 -- -- --   10/29/22 0419 (!) 105/93 -- -- -- -- --   10/29/22 0404 (!) 85/61 99.3 °F (37.4 °C) 74 18 93 % --   10/29/22 0400 -- -- 80 -- -- --     No intake/output data recorded. 10/27 1901 - 10/29 0700  In: 1254 [P.O.:854; I.V.:400]  Out: 2975 [Urine:2975]    PHYSICAL EXAM:  General: Cooperative, in no acute distress    HEENT: Atraumatic, Anicteric sclerae. Chest/ Lungs: Adequate air movement bilaterally, no overt wheezing, rhonchi or rales. Heart:  Regular rate & rhythm, no murmurs  Abdomen: Soft, Non distended, Non tender. +Bowel sounds  Extremities: No clubbing or cyanosis  Neurologic:  Grossly alert & oriented  Skin:   No jaundice, no rashes  Psych:   Appropriate mood and affect, appropriate insight     Data Review     Recent Labs     10/28/22  0002 10/27/22  0226   WBC 5.8 6.2   HGB 10.3* 10.5*   HCT 38.8 38.5    323     Recent Labs     10/28/22  0002 10/27/22  0226    134*   K 4.0 3.4*   CL 98 96*   CO2 32 33*   BUN 19 22*   CREA 1.34* 1.56*   * 127*   CA 7.8* 8.4*     Recent Labs     10/28/22  0002   AP 41*   TP 7.9   ALB 2.4*   GLOB 5.5*     No results for input(s): INR, PTP, APTT, INREXT in the last 72 hours.     Radiology Review    Nothing pertinent to review       Assessment:     39 yo M w/ BMI 45, NICM w/ LVEF  20%, HTN, presented for further eval of dyspnea 2/2 CHF exacerbation, initially on BIPAP, but w/ diuresis and resumption of medical regimen has been doing well- GI consulted today after pt and RN noted significant amount of painless red blood per rectum along with a bowel movement. Presentation most c/w rectal outlet bleeding which thankfully is self limiting the vast majority of the time  That said, he is due for a screening colonoscopy next year and this bleeding does appear to be an intermittent issue for him  He is amenable to a colonoscopy before he goes home     Plan:     Monitor for now- clinically and with daily CBC  Tentative plan for colonoscopy early next week, tentatively Tuesday.  I am not overly enthusiastic about doing an EGD as well given his heart disease and YONATAN habitus     Signed By: Pb Herrera MD     10/29/2022  11:15 AM

## 2022-10-29 NOTE — PROGRESS NOTES
[address] [facility]  Hospitalist Group                                                                                          Hospitalist Progress Note  Sandi Harada, MD  Answering service: [phone] from in house phone        Date of Service:  10/29/2022  NAME:  Fidel Guzman  :  1980  MRN:  [id number]      Admission Summary:     A 38year old male patient with PMH of CHF with EF 20%, HTN presented to ED for evaluation of worsening sob. Patient has been non complain with his medications - he only takes torsemide and stopped taking other meds as he feels that he gets amnesia/ memory impairment. He has been noticing worsening sob since 1 month. He was not able to sleep. + PND and orthopnea. He c/o worsening edema of legs and abdominal distension. He does have chronic productive cough. He also states that he has on/off chest pains. He denied fever, abd pain, urinary or bowel changes. In ED, pro BNP elevated , IV lasix 80mg given. Hospitalist consulted for admission  10/25/2022     Interval history / Subjective:     He said he he had a bowel movement with blood last night, he had also small for the last couple of days. No abdominal pain, hx of GI bleed or hemorrhoids. Assessment & Plan:     Acute on chronic systolic and diastolic CHF NYHA class IV on poa  Acute hypoxic respiratory failure   - pro BNP 4293  - CT: Mild groundglass opacity which may represent mild pulmonary edema    - echo on 10/27/2022 severely reduced left ventricular systolic function with estimated EF 20 to 25%, left ventricle is moderately dilated, severe septal thickening. Finding consistent with severe concentric hypertrophy.   Severe global hypokinesis present  -Monitor strict I&Os , daily weight   -IV Lasix switched to oral torsemide 100 mg p.o. twice daily, on Coreg, Entresto, and empagliflozin  - hold aldactone   - venous duplex: no DVT   - SpO2 % on 3 l/m, monitor pulse ox, wean down oxygen  -Cardiologist on board     Fever on 10/26 possible due to aspiration PNA   - normal lactic, procalcitonin  - resp panel neg  - CTA chest: no PE. Small nodular airspace opacities in the right lower lobe may reflect aspiration or infection   -On IV zosyn      ANH possibly prerenal  - cr 1.43 on poa - cr improved to 1.34  -Avoid nephrotoxin   -monitor renal function while patient on diuretics     HTN  -BP low normal, on coreg, entresto, demadex, add midodine and monitor blood pressure     Morbid obesity   -Body mass index is 44.48 kg/m². -Recommend weight loss     Possible GI bleed  -hem occult positive on 10/28  -no abdominal pain  -hold lovenox  -check Hgb, if it drops will involve GI, but high risk for invasive procedure at this time unless he has active bleeding  -if he has active bleeding will do CTA of abdomen      Suspect YONATAN  - sleep study as outpt        Code status: Full code  Prophylaxis: hold [address] discussed with: Patient, nurse and CM  Anticipated Disposition: Home with home health care service in the next 24 to 48 hours     Hospital Problems  Never Reviewed            Codes Class Noted POA    CHF exacerbation ([facility] [address] ICD-10-CM: I50.9  ICD-9-CM: 428.0  10/25/2022 Unknown         Vital Signs:    Last 24hrs VS reviewed since prior progress note. Most recent are:  Visit Vitals  /83 (BP 1 Location: Right upper arm, BP Patient Position: Sitting; At rest)   Pulse 67   Temp 98.4 °F (36.9 °C)   Resp 19   Ht 5' 10\" (1.778 m)   Wt 152.1 kg (335 lb 5.1 oz)   SpO2 98%   BMI 48.11 kg/m²         Intake/Output Summary (Last 24 hours) at 10/29/2022 1035  Last data filed at 10/29/2022 0423  Gross per 24 hour   Intake 500 ml   Output 1825 ml   Net -1325 ml          Physical Examination:     I had a face to face encounter with this patient and independently examined them on 10/29/2022 as outlined below:          Constitutional:  No acute distress, cooperative, pleasant    ENT:  Oral mucosa moist, oropharynx benign. Resp: Decreased bronchial breath sounds to auscultation bilaterally. No wheezing/rhonchi/rales. No accessory muscle use. CV:  Regular rhythm, normal rate, no murmurs, gallops, rubs    GI:  Soft, non distended, non tender. normoactive bowel sounds, no hepatosplenomegaly     Musculoskeletal: Bilateral pretibial and pedal.    Neurologic: Conscious in the last ,moves all extremities. AAOx3, CN II-XII reviewed            Data Review:    Review and/or order of clinical lab test  Review and/or order of tests in the radiology section of CPT  Review and/or order of tests in the medicine section of CPT      Labs:     Recent Labs     10/28/22  0002 10/27/22  0226   WBC 5.8 6.2   HGB 10.3* 10.5*   HCT 38.8 38.5    323       Recent Labs     10/28/22  0002 10/27/22  0226    134*   K 4.0 3.4*   CL 98 96*   CO2 32 33*   BUN 19 22*   CREA 1.34* 1.56*   * 127*   CA 7.8* 8.4*       Recent Labs     10/28/22  0002   ALT 14   AP 41*   TBILI 0.3   TP 7.9   ALB 2.4*   GLOB 5.5*       No results for input(s): INR, PTP, APTT, INREXT, INREXT in the last 72 hours. No results for input(s): FE, TIBC, PSAT, FERR in the last 72 hours. No results found for: FOL, RBCF   No results for input(s): PH, PCO2, PO2 in the last 72 hours. No results for input(s): CPK, CKNDX, TROIQ in the last 72 hours.     No lab exists for component: CPKMB  No results found for: CHOL, CHOLX, CHLST, CHOLV, HDL, HDLP, LDL, LDLC, DLDLP, TGLX, TRIGL, TRIGP, CHHD, CHHDX  Lab Results   Component Value Date/Time    Glucose (POC) 99 12/04/2014 12:44 PM     No results found for: COLOR, APPRN, SPGRU, REFSG, CORINNA, PROTU, GLUCU, KETU, BILU, UROU, LALO, LEUKU, GLUKE, EPSU, BACTU, WBCU, RBCU, CASTS, UCRY      Medications Reviewed:     Current Facility-Administered Medications   Medication Dose Route Frequency    midodrine (PROAMATINE) tablet 5 mg  5 mg Oral TID WITH MEALS    torsemide (DEMADEX) tablet 100 mg  100 mg Oral BID pantoprazole (PROTONIX) tablet 40 mg  40 mg Oral ACB    enoxaparin (LOVENOX) injection 30 mg  30 mg SubCUTAneous Q12H    piperacillin-tazobactam (ZOSYN) 4.5 g in 0.9% sodium chloride (MBP/ADV) 100 mL MBP  4.5 g IntraVENous Q8H    sodium chloride (NS) flush 5-40 mL  5-40 mL IntraVENous Q8H    sodium chloride (NS) flush 5-40 mL  5-40 mL IntraVENous PRN    acetaminophen (TYLENOL) tablet 650 mg  650 mg Oral Q6H PRN    Or    acetaminophen (TYLENOL) suppository 650 mg  650 mg Rectal Q6H PRN    polyethylene glycol (MIRALAX) packet 17 g  17 g Oral DAILY PRN    ondansetron (ZOFRAN ODT) tablet 4 mg  4 mg Oral Q8H PRN    Or    ondansetron (ZOFRAN) injection 4 mg  4 mg IntraVENous Q6H PRN    carvediloL (COREG) tablet 3.125 mg  3.125 mg Oral BID    sacubitriL-valsartan (ENTRESTO) 49-51 mg tablet 1 Tablet  1 Tablet Oral Q12H    empagliflozin (JARDIANCE) tablet 10 mg  10 mg Oral DAILY    albuterol-ipratropium (DUO-NEB) 2.5 MG-0.5 MG/3 ML  3 mL Nebulization Q4H PRN     ______________________________________________________________________  EXPECTED LENGTH OF STAY: 3d 19h  ACTUAL LENGTH OF STAY:          4                 [place]w Tomasa Leventhal, MD

## 2022-10-29 NOTE — PROGRESS NOTES
4304: Pt attempted to have bowl movement, unsuccessful but there was a moderate amount of bright red blood in toilet. Pt reports blood in stool did not occur until after admitted to [facility]. NP on call was notified and GI consult placed.

## 2022-10-30 LAB
ALBUMIN SERPL-MCNC: 2.4 G/DL (ref 3.5–5)
ALBUMIN/GLOB SERPL: 0.4 {RATIO} (ref 1.1–2.2)
ALP SERPL-CCNC: 38 U/L (ref 45–117)
ALT SERPL-CCNC: 14 U/L (ref 12–78)
ANION GAP SERPL CALC-SCNC: 5 MMOL/L (ref 5–15)
AST SERPL-CCNC: 13 U/L (ref 15–37)
BILIRUB SERPL-MCNC: 0.3 MG/DL (ref 0.2–1)
BUN SERPL-MCNC: 14 MG/DL (ref 6–20)
BUN/CREAT SERPL: 10 (ref 12–20)
CALCIUM SERPL-MCNC: 7.6 MG/DL (ref 8.5–10.1)
CHLORIDE SERPL-SCNC: 98 MMOL/L (ref 97–108)
CO2 SERPL-SCNC: 34 MMOL/L (ref 21–32)
CREAT SERPL-MCNC: 1.37 MG/DL (ref 0.7–1.3)
ERYTHROCYTE [DISTWIDTH] IN BLOOD BY AUTOMATED COUNT: 18 % (ref 11.5–14.5)
GLOBULIN SER CALC-MCNC: 6 G/DL (ref 2–4)
GLUCOSE SERPL-MCNC: 110 MG/DL (ref 65–100)
HCT VFR BLD AUTO: 35 % (ref 36.6–50.3)
HGB BLD-MCNC: 9.4 G/DL (ref 12.1–17)
MCH RBC QN AUTO: 22.1 PG (ref 26–34)
MCHC RBC AUTO-ENTMCNC: 26.9 G/DL (ref 30–36.5)
MCV RBC AUTO: 82.2 FL (ref 80–99)
NRBC # BLD: 0 K/UL (ref 0–0.01)
NRBC BLD-RTO: 0 PER 100 WBC
PLATELET # BLD AUTO: 326 K/UL (ref 150–400)
PMV BLD AUTO: 10.2 FL (ref 8.9–12.9)
POTASSIUM SERPL-SCNC: 3.5 MMOL/L (ref 3.5–5.1)
PROT SERPL-MCNC: 8.4 G/DL (ref 6.4–8.2)
RBC # BLD AUTO: 4.26 M/UL (ref 4.1–5.7)
SODIUM SERPL-SCNC: 137 MMOL/L (ref 136–145)
WBC # BLD AUTO: 7.9 K/UL (ref 4.1–11.1)

## 2022-10-30 PROCEDURE — 36415 COLL VENOUS BLD VENIPUNCTURE: CPT

## 2022-10-30 PROCEDURE — 80053 COMPREHEN METABOLIC PANEL: CPT

## 2022-10-30 PROCEDURE — 74011000250 HC RX REV CODE- 250: Performed by: INTERNAL MEDICINE

## 2022-10-30 PROCEDURE — 85027 COMPLETE CBC AUTOMATED: CPT

## 2022-10-30 PROCEDURE — 74011250637 HC RX REV CODE- 250/637: Performed by: INTERNAL MEDICINE

## 2022-10-30 PROCEDURE — 65270000046 HC RM TELEMETRY

## 2022-10-30 PROCEDURE — 74011000258 HC RX REV CODE- 258: Performed by: INTERNAL MEDICINE

## 2022-10-30 PROCEDURE — 74011250637 HC RX REV CODE- 250/637: Performed by: HOSPITALIST

## 2022-10-30 PROCEDURE — 74011250636 HC RX REV CODE- 250/636: Performed by: INTERNAL MEDICINE

## 2022-10-30 RX ADMIN — SODIUM CHLORIDE, PRESERVATIVE FREE 10 ML: 5 INJECTION INTRAVENOUS at 06:19

## 2022-10-30 RX ADMIN — TORSEMIDE 100 MG: 100 TABLET ORAL at 09:07

## 2022-10-30 RX ADMIN — SODIUM CHLORIDE, PRESERVATIVE FREE 10 ML: 5 INJECTION INTRAVENOUS at 16:02

## 2022-10-30 RX ADMIN — MIDODRINE HYDROCHLORIDE 5 MG: 5 TABLET ORAL at 09:19

## 2022-10-30 RX ADMIN — PIPERACILLIN AND TAZOBACTAM 4.5 G: 4; .5 INJECTION, POWDER, FOR SOLUTION INTRAVENOUS at 00:55

## 2022-10-30 RX ADMIN — SACUBITRIL AND VALSARTAN 1 TABLET: 49; 51 TABLET, FILM COATED ORAL at 21:22

## 2022-10-30 RX ADMIN — CARVEDILOL 3.12 MG: 3.12 TABLET, FILM COATED ORAL at 19:28

## 2022-10-30 RX ADMIN — CARVEDILOL 3.12 MG: 3.12 TABLET, FILM COATED ORAL at 09:08

## 2022-10-30 RX ADMIN — PIPERACILLIN AND TAZOBACTAM 4.5 G: 4; .5 INJECTION, POWDER, FOR SOLUTION INTRAVENOUS at 18:15

## 2022-10-30 RX ADMIN — SODIUM CHLORIDE, PRESERVATIVE FREE 10 ML: 5 INJECTION INTRAVENOUS at 21:22

## 2022-10-30 RX ADMIN — PANTOPRAZOLE SODIUM 40 MG: 40 TABLET, DELAYED RELEASE ORAL at 07:22

## 2022-10-30 RX ADMIN — EMPAGLIFLOZIN 10 MG: 10 TABLET, FILM COATED ORAL at 09:08

## 2022-10-30 RX ADMIN — PIPERACILLIN AND TAZOBACTAM 4.5 G: 4; .5 INJECTION, POWDER, FOR SOLUTION INTRAVENOUS at 09:08

## 2022-10-30 RX ADMIN — TORSEMIDE 100 MG: 100 TABLET ORAL at 19:28

## 2022-10-30 NOTE — PROGRESS NOTES
[address] [facility]  Hospitalist Group                                                                                          Hospitalist Progress Note  Maria Isabel Pollard MD  Answering service: [phone] from in house phone        Date of Service:  10/30/2022  NAME:  Sejal Jones  :  1980  MRN:  [id number]      Admission Summary:     A 38year old male patient with PMH of CHF with EF 20%, HTN presented to ED for evaluation of worsening sob. Patient has been non complain with his medications - he only takes torsemide and stopped taking other meds as he feels that he gets amnesia/ memory impairment. He has been noticing worsening sob since 1 month. He was not able to sleep. + PND and orthopnea. He c/o worsening edema of legs and abdominal distension. He does have chronic productive cough. He also states that he has on/off chest pains. He denied fever, abd pain, urinary or bowel changes. In ED, pro BNP elevated , IV lasix 80mg given. Hospitalist consulted for admission  10/25/2022     Interval history / Subjective:     He said he he had a bowel movement with blood this morning, no abdominal pain, left-sided chest pain or palpitation     Assessment & Plan:     Acute on chronic systolic and diastolic CHF NYHA class IV on poa  Acute hypoxic respiratory failure   - pro BNP 4293  - CT: Mild groundglass opacity which may represent mild pulmonary edema    - echo on 10/27/2022 severely reduced left ventricular systolic function with estimated EF 20 to 25%, left ventricle is moderately dilated, severe septal thickening. Finding consistent with severe concentric hypertrophy.   Severe global hypokinesis present  -Monitor strict I&Os , daily weight   -IV Lasix switched to oral torsemide 100 mg p.o. twice daily, on Coreg, blood pressure low normal this morning, hold Entresto, and continue empagliflozin, monitor blood pressure  - hold aldactone   - venous duplex: no DVT   - SpO2 % on 3 l/m, monitor pulse ox, wean down oxygen  -Cardiologist on board     Fever on 10/26 possible due to aspiration PNA   - normal lactic, procalcitonin  - resp panel neg  - CTA chest: no PE. Small nodular airspace opacities in the right lower lobe may reflect aspiration or infection   -On IV zosyn      ANH possibly prerenal  -Creatinine 1.43 on poa - cr improved to 1.37  -Avoid nephrotoxin   -monitor renal function while patient on diuretics     HTN  -BP low normal, on coreg,demadex, hold Entresto add midodine and monitor blood pressure     Morbid obesity   -Body mass index is 44.48 kg/m². -Recommend weight loss     Possible GI bleed  -hem occult positive on 10/28  -no abdominal pain  -hold lovenox  -Hgb slightly trending down Hgb 9.4  -consult GI     Suspect YONATAN  - sleep study as outpt        Code status: Full code  Prophylaxis: hold [address] [address] discussed with: Patient, nurse and CM  Anticipated Disposition: Home with home health care service in the next 24 to 48 hours     Hospital Problems  Never Reviewed            Codes Class Noted POA    CHF exacerbation ([facility] [address] ICD-10-CM: I50.9  ICD-9-CM: 428.0  10/25/2022 Unknown       Vital Signs:    Last 24hrs VS reviewed since prior progress note. Most recent are:  Visit Vitals  BP (!) 119/92 (BP 1 Location: Left upper arm, BP Patient Position: Sitting; At rest)   Pulse 76   Temp 98.4 °F (36.9 °C)   Resp 18   Ht 5' 10\" (1.778 m)   Wt 150.8 kg (332 lb 7.3 oz)   SpO2 95%   BMI 47.70 kg/m²         Intake/Output Summary (Last 24 hours) at 10/30/2022 1106  Last data filed at 10/30/2022 0283  Gross per 24 hour   Intake 550 ml   Output 4225 ml   Net -3675 ml          Physical Examination:     I had a face to face encounter with this patient and independently examined them on 10/30/2022 as outlined below:          Constitutional:  No acute distress, cooperative, pleasant    ENT:  Oral mucosa moist, oropharynx benign.     Resp: Decreased bronchial breath sounds to auscultation bilaterally. No wheezing/rhonchi/rales. No accessory muscle use. CV:  Regular rhythm, normal rate, no murmurs, gallops, rubs    GI:  Soft, non distended, non tender. normoactive bowel sounds, no hepatosplenomegaly     Musculoskeletal: Bilateral pretibial and pedal.    Neurologic: Conscious in the last ,moves all extremities. AAOx3, CN II-XII reviewed            Data Review:    Review and/or order of clinical lab test  Review and/or order of tests in the radiology section of CPT  Review and/or order of tests in the medicine section of [facility]      Labs:     Recent Labs     10/30/22  0035 10/29/22  1501   WBC 7.9 8.2   HGB 9.4* 9.5*   HCT 35.0* 35.2*    315       Recent Labs     10/30/22  0035 10/28/22  0002    136   K 3.5 4.0   CL 98 98   CO2 34* 32   BUN 14 19   CREA 1.37* 1.34*   * 123*   CA 7.6* 7.8*       Recent Labs     10/30/22  0035 10/28/22  0002   ALT 14 14   AP 38* 41*   TBILI 0.3 0.3   TP 8.4* 7.9   ALB 2.4* 2.4*   GLOB 6.0* 5.5*       No results for input(s): INR, PTP, APTT, INREXT, INREXT in the last 72 hours. No results for input(s): FE, TIBC, PSAT, FERR in the last 72 hours. No results found for: FOL, RBCF   No results for input(s): PH, PCO2, PO2 in the last 72 hours. No results for input(s): CPK, CKNDX, TROIQ in the last 72 hours.     No lab exists for component: CPKMB  No results found for: CHOL, CHOLX, CHLST, CHOLV, HDL, HDLP, LDL, LDLC, DLDLP, TGLX, TRIGL, TRIGP, CHHD, CHHDX  Lab Results   Component Value Date/Time    Glucose (POC) 99 12/04/2014 12:44 PM     No results found for: COLOR, APPRN, SPGRU, REFSG, CORINNA, PROTU, GLUCU, KETU, BILU, UROU, LALO, LEUKU, GLUKE, EPSU, BACTU, WBCU, RBCU, CASTS, UCRY      Medications Reviewed:     Current Facility-Administered Medications   Medication Dose Route Frequency    midodrine (PROAMATINE) tablet 5 mg  5 mg Oral TID WITH MEALS    torsemide (DEMADEX) tablet 100 mg  100 mg Oral BID    pantoprazole (PROTONIX) tablet 40 mg 40 mg Oral ACB    [Held by provider] enoxaparin (LOVENOX) injection 30 mg  30 mg SubCUTAneous Q12H    piperacillin-tazobactam (ZOSYN) 4.5 g in 0.9% sodium chloride (MBP/ADV) 100 mL MBP  4.5 g IntraVENous Q8H    sodium chloride (NS) flush 5-40 mL  5-40 mL IntraVENous Q8H    sodium chloride (NS) flush 5-40 mL  5-40 mL IntraVENous PRN    acetaminophen (TYLENOL) tablet 650 mg  650 mg Oral Q6H PRN    Or    acetaminophen (TYLENOL) suppository 650 mg  650 mg Rectal Q6H PRN    polyethylene glycol (MIRALAX) packet 17 g  17 g Oral DAILY PRN    ondansetron (ZOFRAN ODT) tablet 4 mg  4 mg Oral Q8H PRN    Or    ondansetron (ZOFRAN) injection 4 mg  4 mg IntraVENous Q6H PRN    carvediloL (COREG) tablet 3.125 mg  3.125 mg Oral BID    sacubitriL-valsartan (ENTRESTO) 49-51 mg tablet 1 Tablet  1 Tablet Oral Q12H    empagliflozin (JARDIANCE) tablet 10 mg  10 mg Oral DAILY    albuterol-ipratropium (DUO-NEB) 2.5 MG-0.5 MG/3 ML  3 mL Nebulization Q4H PRN     ______________________________________________________________________  EXPECTED LENGTH OF STAY: 3d 19h  ACTUAL LENGTH OF STAY:          5                 Elieser King MD

## 2022-10-30 NOTE — PROGRESS NOTES
Pt had 7 beats of Vtach, per monitor tech. Pt is asymptomatic. B/P 94/68, pulse 70, now showing SR with BBB and PVCs. O2 sat 97%. Will continue to monitor as per protocol and PRN. Call light within reach.

## 2022-10-31 LAB
ALBUMIN SERPL-MCNC: 2.6 G/DL (ref 3.5–5)
ALBUMIN/GLOB SERPL: 0.4 {RATIO} (ref 1.1–2.2)
ALP SERPL-CCNC: 36 U/L (ref 45–117)
ALT SERPL-CCNC: 17 U/L (ref 12–78)
ANION GAP SERPL CALC-SCNC: 1 MMOL/L (ref 5–15)
AST SERPL-CCNC: 34 U/L (ref 15–37)
BILIRUB SERPL-MCNC: 0.4 MG/DL (ref 0.2–1)
BUN SERPL-MCNC: 13 MG/DL (ref 6–20)
BUN/CREAT SERPL: 10 (ref 12–20)
CALCIUM SERPL-MCNC: 8.1 MG/DL (ref 8.5–10.1)
CHLORIDE SERPL-SCNC: 99 MMOL/L (ref 97–108)
CO2 SERPL-SCNC: 38 MMOL/L (ref 21–32)
CREAT SERPL-MCNC: 1.28 MG/DL (ref 0.7–1.3)
ERYTHROCYTE [DISTWIDTH] IN BLOOD BY AUTOMATED COUNT: 18.2 % (ref 11.5–14.5)
GLOBULIN SER CALC-MCNC: 5.9 G/DL (ref 2–4)
GLUCOSE SERPL-MCNC: 136 MG/DL (ref 65–100)
HCT VFR BLD AUTO: 35.7 % (ref 36.6–50.3)
HGB BLD-MCNC: 9.6 G/DL (ref 12.1–17)
MCH RBC QN AUTO: 22.5 PG (ref 26–34)
MCHC RBC AUTO-ENTMCNC: 26.9 G/DL (ref 30–36.5)
MCV RBC AUTO: 83.6 FL (ref 80–99)
NRBC # BLD: 0 K/UL (ref 0–0.01)
NRBC BLD-RTO: 0 PER 100 WBC
PLATELET # BLD AUTO: 332 K/UL (ref 150–400)
PMV BLD AUTO: 10.5 FL (ref 8.9–12.9)
POTASSIUM SERPL-SCNC: 4.2 MMOL/L (ref 3.5–5.1)
PROT SERPL-MCNC: 8.5 G/DL (ref 6.4–8.2)
RBC # BLD AUTO: 4.27 M/UL (ref 4.1–5.7)
SODIUM SERPL-SCNC: 138 MMOL/L (ref 136–145)
WBC # BLD AUTO: 8.2 K/UL (ref 4.1–11.1)

## 2022-10-31 PROCEDURE — 74011250637 HC RX REV CODE- 250/637: Performed by: INTERNAL MEDICINE

## 2022-10-31 PROCEDURE — 74011250636 HC RX REV CODE- 250/636: Performed by: INTERNAL MEDICINE

## 2022-10-31 PROCEDURE — 74011250637 HC RX REV CODE- 250/637: Performed by: HOSPITALIST

## 2022-10-31 PROCEDURE — 80053 COMPREHEN METABOLIC PANEL: CPT

## 2022-10-31 PROCEDURE — 74011000250 HC RX REV CODE- 250: Performed by: INTERNAL MEDICINE

## 2022-10-31 PROCEDURE — 36415 COLL VENOUS BLD VENIPUNCTURE: CPT

## 2022-10-31 PROCEDURE — 65270000046 HC RM TELEMETRY

## 2022-10-31 PROCEDURE — 85027 COMPLETE CBC AUTOMATED: CPT

## 2022-10-31 PROCEDURE — 74011000258 HC RX REV CODE- 258: Performed by: INTERNAL MEDICINE

## 2022-10-31 RX ADMIN — PANTOPRAZOLE SODIUM 40 MG: 40 TABLET, DELAYED RELEASE ORAL at 07:02

## 2022-10-31 RX ADMIN — EMPAGLIFLOZIN 10 MG: 10 TABLET, FILM COATED ORAL at 09:47

## 2022-10-31 RX ADMIN — SODIUM CHLORIDE, PRESERVATIVE FREE 10 ML: 5 INJECTION INTRAVENOUS at 16:54

## 2022-10-31 RX ADMIN — SACUBITRIL AND VALSARTAN 1 TABLET: 49; 51 TABLET, FILM COATED ORAL at 09:47

## 2022-10-31 RX ADMIN — SODIUM CHLORIDE, PRESERVATIVE FREE 10 ML: 5 INJECTION INTRAVENOUS at 06:02

## 2022-10-31 RX ADMIN — PIPERACILLIN AND TAZOBACTAM 4.5 G: 4; .5 INJECTION, POWDER, FOR SOLUTION INTRAVENOUS at 09:52

## 2022-10-31 RX ADMIN — TORSEMIDE 100 MG: 100 TABLET ORAL at 17:15

## 2022-10-31 RX ADMIN — SODIUM CHLORIDE, PRESERVATIVE FREE 10 ML: 5 INJECTION INTRAVENOUS at 22:00

## 2022-10-31 RX ADMIN — PIPERACILLIN AND TAZOBACTAM 4.5 G: 4; .5 INJECTION, POWDER, FOR SOLUTION INTRAVENOUS at 01:12

## 2022-10-31 RX ADMIN — CARVEDILOL 3.12 MG: 3.12 TABLET, FILM COATED ORAL at 09:47

## 2022-10-31 RX ADMIN — SACUBITRIL AND VALSARTAN 1 TABLET: 49; 51 TABLET, FILM COATED ORAL at 21:54

## 2022-10-31 RX ADMIN — CARVEDILOL 3.12 MG: 3.12 TABLET, FILM COATED ORAL at 17:15

## 2022-10-31 RX ADMIN — TORSEMIDE 100 MG: 100 TABLET ORAL at 09:47

## 2022-10-31 RX ADMIN — PIPERACILLIN AND TAZOBACTAM 4.5 G: 4; .5 INJECTION, POWDER, FOR SOLUTION INTRAVENOUS at 17:15

## 2022-10-31 NOTE — PROGRESS NOTES
[address]  [place], [address]       GI PROGRESS NOTE  [facility] office  [phone] NP in-hospital cell phone M-F until 4:30  After 5pm or on weekends, please call  for physician on call      NAME: Mima Galeazzi   :  1980   MRN:  [id number]       Subjective:   No acute events. BM with bleeding this am, less than before. No GI complaints, eating regular diet. Objective:     VITALS:   Last 24hrs VS reviewed since prior progress note. Most recent are:  Visit Vitals  /75 (BP 1 Location: Right arm, BP Patient Position: At rest)   Pulse 94   Temp 98.9 °F (37.2 °C)   Resp 20   Ht 5' 10\" (1.778 m)   Wt 147.9 kg (326 lb)   SpO2 98%   BMI 46.78 kg/m²       PHYSICAL EXAM:  General: Cooperative, no acute distress    Neurologic:  Alert and oriented X 3. HEENT: EOMI, no scleral icterus   Lungs:  No increased WOB at rest, oxygen  Heart:  regular rate  Abdomen: Soft, obsese, non-distended, no tenderness. Extremities: warm  Psych:   Good insight. Not anxious or agitated. Lab Data Reviewed:     Recent Results (from the past 24 hour(s))   CBC W/O DIFF    Collection Time: 10/31/22  5:30 AM   Result Value Ref Range    WBC 8.2 4.1 - 11.1 K/uL    RBC 4.27 4. 10 - 5.70 M/uL    HGB 9.6 (L) 12.1 - 17.0 g/dL    HCT 35.7 (L) 36.6 - 50.3 %    MCV 83.6 80.0 - 99.0 FL    MCH 22.5 (L) 26.0 - 34.0 PG    MCHC 26.9 (L) 30.0 - 36.5 g/dL    RDW 18.2 (H) 11.5 - 14.5 %    PLATELET 465 422 - 978 K/uL    MPV 10.5 8.9 - 12.9 FL    NRBC 0.0 0  WBC    ABSOLUTE NRBC 0.00 0.00 - 7.02 K/uL   METABOLIC PANEL, COMPREHENSIVE    Collection Time: 10/31/22  5:30 AM   Result Value Ref Range    Sodium 138 136 - 145 mmol/L    Potassium 4.2 3.5 - 5.1 mmol/L    Chloride 99 97 - 108 mmol/L    CO2 38 (H) 21 - 32 mmol/L    Anion gap 1 (L) 5 - 15 mmol/L    Glucose 136 (H) 65 - 100 mg/dL    BUN 13 6 - 20 MG/DL    Creatinine 1.28 0.70 - 1.30 MG/DL    BUN/Creatinine ratio 10 (L) 12 - 20      eGFR >60 >60 ml/min/1.73m2    Calcium 8.1 (L) 8.5 - 10.1 MG/DL    Bilirubin, total 0.4 0.2 - 1.0 MG/DL    ALT (SGPT) 17 12 - 78 U/L    AST (SGOT) 34 15 - 37 U/L    Alk. phosphatase 36 (L) 45 - 117 U/L    Protein, total 8.5 (H) 6.4 - 8.2 g/dL    Albumin 2.6 (L) 3.5 - 5.0 g/dL    Globulin 5.9 (H) 2.0 - 4.0 g/dL    A-G Ratio 0.4 (L) 1.1 - 2.2              Assessment:     Rectal bleeding: hgb 9.6  CHF  Pneumonia   ANH  Obesity      Patient Active Problem List   Diagnosis Code    Chest pain R07.9    CHF exacerbation ([facility] [address] I50.9     Plan:     Anticoagulation on hold  Plan for colonoscopy when medically optimized  CLD tomorrow incase he's ready for colonoscopy Wednesday     Signed By: Chris Gillis NP     10/31/2022  1:45 PM       GI attending note:    Chart reviewed. Agree with note of SYDNEY.     Dr. Nirav Hanna

## 2022-10-31 NOTE — PROGRESS NOTES
[address] [facility] Adult  Hospitalist Group                                                                                          Hospitalist Progress Note  Elieser King MD  Answering service: [phone] from in house phone        Date of Service:  10/31/2022  NAME:  Suzette Olivia  :  1980  MRN:  [id number]      Admission Summary:     A 38year old male patient with PMH of CHF with EF 20%, HTN presented to ED for evaluation of worsening sob. Patient has been non complain with his medications - he only takes torsemide and stopped taking other meds as he feels that he gets amnesia/ memory impairment. He has been noticing worsening sob since 1 month. He was not able to sleep. + PND and orthopnea. He c/o worsening edema of legs and abdominal distension. He does have chronic productive cough. He also states that he has on/off chest pains. He denied fever, abd pain, urinary or bowel changes. In ED, pro BNP elevated , IV lasix 80mg given. Hospitalist consulted for admission  10/25/2022     Interval history / Subjective:     He said he he had a bowel movement last night which is less darker than before, no left-sided chest pain, palpitation, diaphoresis or abdominal pain     Assessment & Plan:     Acute on chronic systolic and diastolic CHF NYHA class IV on poa  Acute hypoxic respiratory failure   - pro BNP 4293  - CT: Mild groundglass opacity which may represent mild pulmonary edema    - echo on 10/27/2022 severely reduced left ventricular systolic function with estimated EF 20 to 25%, left ventricle is moderately dilated, severe septal thickening. Finding consistent with severe concentric hypertrophy.   Severe global hypokinesis present  -Monitor strict I&Os , daily weight   -IV Lasix switched to oral torsemide 100 mg p.o. twice daily, on Coreg, blood pressure low normal this morning, hold Entresto, and continue empagliflozin, monitor blood pressure  - hold aldactone   - venous duplex: no DVT   - SpO2 84-98% on 2 l/m, monitor pulse ox, wean down oxygen  -Cardiologist on board     Fever on 10/26 possible due to aspiration PNA   - normal lactic, procalcitonin  - resp panel neg  - CTA chest: no PE. Small nodular airspace opacities in the right lower lobe may reflect aspiration or infection   -On IV zosyn      ANH possibly prerenal  -Creatinine 1.43 on poa - cr improved to 1.28  -Avoid nephrotoxin   -monitor renal function while patient on diuretics     HTN  -BP improved and low normal, on coreg,demadex, review Entresto  -midodine is discontinued and monitor blood pressure     Morbid obesity   -Body mass index is 44.48 kg/m². -Recommend weight loss     Possible GI bleed  -hem occult positive on 10/28  -no abdominal pain  -hold lovenox  -Hgb now Hgb 9.6  -consult GI  -GI on board and possible colonoscopy tomorrow     Suspect YONATAN  - sleep study as outpt        Code status: Full code  Prophylaxis: hold [address] discussed with: Patient, nurse and CM  Anticipated Disposition: Home with home health care service in the next 24 to 48 hours     Hospital Problems  Never Reviewed            Codes Class Noted POA    CHF exacerbation ([facility] [address] ICD-10-CM: I50.9  ICD-9-CM: 428.0  10/25/2022 Unknown       Vital Signs:    Last 24hrs VS reviewed since prior progress note.  Most recent are:  Visit Vitals  /75 (BP 1 Location: Right arm, BP Patient Position: At rest)   Pulse 87   Temp 98.9 °F (37.2 °C)   Resp 20   Ht 5' 10\" (1.778 m)   Wt 147.9 kg (326 lb)   SpO2 98%   BMI 46.78 kg/m²         Intake/Output Summary (Last 24 hours) at 10/31/2022 1538  Last data filed at 10/31/2022 1198  Gross per 24 hour   Intake 600 ml   Output 2200 ml   Net -1600 ml          Physical Examination:     I had a face to face encounter with this patient and independently examined them on 10/31/2022 as outlined below:          Constitutional:  No acute distress, cooperative, pleasant    ENT:  Oral mucosa moist, oropharynx benign. Resp: Decreased bronchial breath sounds to auscultation bilaterally. No wheezing/rhonchi/rales. No accessory muscle use. CV:  Regular rhythm, normal rate, no murmurs, gallops, rubs    GI:  Soft, non distended, non tender. normoactive bowel sounds, no hepatosplenomegaly     Musculoskeletal: Bilateral pretibial and pedal.    Neurologic: Conscious in the last ,moves all extremities. AAOx3, CN II-XII reviewed            Data Review:    Review and/or order of clinical lab test  Review and/or order of tests in the radiology section of CPT  Review and/or order of tests in the medicine section of CPT      Labs:     Recent Labs     10/31/22  0530 10/30/22  0035   WBC 8.2 7.9   HGB 9.6* 9.4*   HCT 35.7* 35.0*    326       Recent Labs     10/31/22  0530 10/30/22  0035    137   K 4.2 3.5   CL 99 98   CO2 38* 34*   BUN 13 14   CREA 1.28 1.37*   * 110*   CA 8.1* 7.6*       Recent Labs     10/31/22  0530 10/30/22  0035   ALT 17 14   AP 36* 38*   TBILI 0.4 0.3   TP 8.5* 8.4*   ALB 2.6* 2.4*   GLOB 5.9* 6.0*       No results for input(s): INR, PTP, APTT, INREXT, INREXT in the last 72 hours. No results for input(s): FE, TIBC, PSAT, FERR in the last 72 hours. No results found for: FOL, RBCF   No results for input(s): PH, PCO2, PO2 in the last 72 hours. No results for input(s): CPK, CKNDX, TROIQ in the last 72 hours.     No lab exists for component: CPKMB  No results found for: CHOL, CHOLX, CHLST, CHOLV, HDL, HDLP, LDL, LDLC, DLDLP, TGLX, TRIGL, TRIGP, CHHD, CHHDX  Lab Results   Component Value Date/Time    Glucose (POC) 99 12/04/2014 12:44 PM     No results found for: COLOR, APPRN, SPGRU, REFSG, CORINNA, PROTU, GLUCU, KETU, BILU, UROU, LALO, LEUKU, GLUKE, EPSU, BACTU, WBCU, RBCU, CASTS, UCRY      Medications Reviewed:     Current Facility-Administered Medications   Medication Dose Route Frequency    torsemide (DEMADEX) tablet 100 mg  100 mg Oral BID    pantoprazole (PROTONIX) tablet 40 mg 40 mg Oral ACB    [Held by provider] enoxaparin (LOVENOX) injection 30 mg  30 mg SubCUTAneous Q12H    piperacillin-tazobactam (ZOSYN) 4.5 g in 0.9% sodium chloride (MBP/ADV) 100 mL MBP  4.5 g IntraVENous Q8H    sodium chloride (NS) flush 5-40 mL  5-40 mL IntraVENous Q8H    sodium chloride (NS) flush 5-40 mL  5-40 mL IntraVENous PRN    acetaminophen (TYLENOL) tablet 650 mg  650 mg Oral Q6H PRN    Or    acetaminophen (TYLENOL) suppository 650 mg  650 mg Rectal Q6H PRN    polyethylene glycol (MIRALAX) packet 17 g  17 g Oral DAILY PRN    ondansetron (ZOFRAN ODT) tablet 4 mg  4 mg Oral Q8H PRN    Or    ondansetron (ZOFRAN) injection 4 mg  4 mg IntraVENous Q6H PRN    carvediloL (COREG) tablet 3.125 mg  3.125 mg Oral BID    sacubitriL-valsartan (ENTRESTO) 49-51 mg tablet 1 Tablet  1 Tablet Oral Q12H    empagliflozin (JARDIANCE) tablet 10 mg  10 mg Oral DAILY    albuterol-ipratropium (DUO-NEB) 2.5 MG-0.5 MG/3 ML  3 mL Nebulization Q4H PRN     ______________________________________________________________________  EXPECTED LENGTH OF STAY: 3d 19h  ACTUAL LENGTH OF STAY:          6                 Corby Louis MD

## 2022-10-31 NOTE — PROGRESS NOTES
Bedside and Verbal shift change report given to Mehdi Aparicio RN (oncoming nurse) by Jordon Maki RN (offgoing nurse). Report included the following information SBAR and Cardiac Rhythm BBB, .

## 2022-11-01 LAB
ALBUMIN SERPL-MCNC: 2.5 G/DL (ref 3.5–5)
ALBUMIN/GLOB SERPL: 0.5 {RATIO} (ref 1.1–2.2)
ALP SERPL-CCNC: 37 U/L (ref 45–117)
ALT SERPL-CCNC: 14 U/L (ref 12–78)
ANION GAP SERPL CALC-SCNC: 4 MMOL/L (ref 5–15)
AST SERPL-CCNC: 16 U/L (ref 15–37)
BILIRUB SERPL-MCNC: 0.4 MG/DL (ref 0.2–1)
BUN SERPL-MCNC: 12 MG/DL (ref 6–20)
BUN/CREAT SERPL: 10 (ref 12–20)
CALCIUM SERPL-MCNC: 8 MG/DL (ref 8.5–10.1)
CHLORIDE SERPL-SCNC: 100 MMOL/L (ref 97–108)
CO2 SERPL-SCNC: 37 MMOL/L (ref 21–32)
CREAT SERPL-MCNC: 1.18 MG/DL (ref 0.7–1.3)
ERYTHROCYTE [DISTWIDTH] IN BLOOD BY AUTOMATED COUNT: 18.1 % (ref 11.5–14.5)
GLOBULIN SER CALC-MCNC: 5.1 G/DL (ref 2–4)
GLUCOSE SERPL-MCNC: 113 MG/DL (ref 65–100)
HCT VFR BLD AUTO: 32.8 % (ref 36.6–50.3)
HGB BLD-MCNC: 8.9 G/DL (ref 12.1–17)
MCH RBC QN AUTO: 22.5 PG (ref 26–34)
MCHC RBC AUTO-ENTMCNC: 27.1 G/DL (ref 30–36.5)
MCV RBC AUTO: 83 FL (ref 80–99)
NRBC # BLD: 0 K/UL (ref 0–0.01)
NRBC BLD-RTO: 0 PER 100 WBC
PLATELET # BLD AUTO: 335 K/UL (ref 150–400)
PMV BLD AUTO: 10 FL (ref 8.9–12.9)
POTASSIUM SERPL-SCNC: 3.7 MMOL/L (ref 3.5–5.1)
PROT SERPL-MCNC: 7.6 G/DL (ref 6.4–8.2)
RBC # BLD AUTO: 3.95 M/UL (ref 4.1–5.7)
SODIUM SERPL-SCNC: 141 MMOL/L (ref 136–145)
WBC # BLD AUTO: 7.9 K/UL (ref 4.1–11.1)

## 2022-11-01 PROCEDURE — 74011250637 HC RX REV CODE- 250/637: Performed by: HOSPITALIST

## 2022-11-01 PROCEDURE — P9047 ALBUMIN (HUMAN), 25%, 50ML: HCPCS | Performed by: NURSE PRACTITIONER

## 2022-11-01 PROCEDURE — 65270000046 HC RM TELEMETRY

## 2022-11-01 PROCEDURE — 74011000250 HC RX REV CODE- 250: Performed by: INTERNAL MEDICINE

## 2022-11-01 PROCEDURE — 74011250637 HC RX REV CODE- 250/637: Performed by: INTERNAL MEDICINE

## 2022-11-01 PROCEDURE — 80053 COMPREHEN METABOLIC PANEL: CPT

## 2022-11-01 PROCEDURE — 74011250636 HC RX REV CODE- 250/636: Performed by: INTERNAL MEDICINE

## 2022-11-01 PROCEDURE — 74011000258 HC RX REV CODE- 258: Performed by: INTERNAL MEDICINE

## 2022-11-01 PROCEDURE — 74011000250 HC RX REV CODE- 250: Performed by: NURSE PRACTITIONER

## 2022-11-01 PROCEDURE — 77010033678 HC OXYGEN DAILY

## 2022-11-01 PROCEDURE — 74011250636 HC RX REV CODE- 250/636: Performed by: NURSE PRACTITIONER

## 2022-11-01 PROCEDURE — 85027 COMPLETE CBC AUTOMATED: CPT

## 2022-11-01 PROCEDURE — 36415 COLL VENOUS BLD VENIPUNCTURE: CPT

## 2022-11-01 RX ORDER — ALBUMIN HUMAN 250 G/1000ML
12.5 SOLUTION INTRAVENOUS ONCE
Status: COMPLETED | OUTPATIENT
Start: 2022-11-02 | End: 2022-11-01

## 2022-11-01 RX ADMIN — SODIUM CHLORIDE, PRESERVATIVE FREE 10 ML: 5 INJECTION INTRAVENOUS at 06:27

## 2022-11-01 RX ADMIN — SODIUM CHLORIDE, PRESERVATIVE FREE 10 ML: 5 INJECTION INTRAVENOUS at 21:13

## 2022-11-01 RX ADMIN — TORSEMIDE 100 MG: 100 TABLET ORAL at 09:10

## 2022-11-01 RX ADMIN — PIPERACILLIN AND TAZOBACTAM 4.5 G: 4; .5 INJECTION, POWDER, FOR SOLUTION INTRAVENOUS at 09:09

## 2022-11-01 RX ADMIN — PIPERACILLIN AND TAZOBACTAM 4.5 G: 4; .5 INJECTION, POWDER, FOR SOLUTION INTRAVENOUS at 02:27

## 2022-11-01 RX ADMIN — POLYETHYLENE GLYCOL 3350, SODIUM SULFATE ANHYDROUS, SODIUM BICARBONATE, SODIUM CHLORIDE, POTASSIUM CHLORIDE 2000 ML: 236; 22.74; 6.74; 5.86; 2.97 POWDER, FOR SOLUTION ORAL at 18:09

## 2022-11-01 RX ADMIN — CARVEDILOL 3.12 MG: 3.12 TABLET, FILM COATED ORAL at 17:38

## 2022-11-01 RX ADMIN — SACUBITRIL AND VALSARTAN 1 TABLET: 49; 51 TABLET, FILM COATED ORAL at 09:09

## 2022-11-01 RX ADMIN — SACUBITRIL AND VALSARTAN 1 TABLET: 49; 51 TABLET, FILM COATED ORAL at 21:13

## 2022-11-01 RX ADMIN — CARVEDILOL 3.12 MG: 3.12 TABLET, FILM COATED ORAL at 09:09

## 2022-11-01 RX ADMIN — EMPAGLIFLOZIN 10 MG: 10 TABLET, FILM COATED ORAL at 09:10

## 2022-11-01 RX ADMIN — PANTOPRAZOLE SODIUM 40 MG: 40 TABLET, DELAYED RELEASE ORAL at 08:26

## 2022-11-01 RX ADMIN — ALBUMIN (HUMAN) 12.5 G: 0.25 INJECTION, SOLUTION INTRAVENOUS at 23:55

## 2022-11-01 RX ADMIN — SODIUM CHLORIDE, PRESERVATIVE FREE 10 ML: 5 INJECTION INTRAVENOUS at 17:38

## 2022-11-01 RX ADMIN — TORSEMIDE 100 MG: 100 TABLET ORAL at 17:39

## 2022-11-01 NOTE — PROGRESS NOTES
[address]  Aminah Denny, [address]       GI PROGRESS NOTE  [facility]  [phone] NP in-hospital cell phone M-F until 4:30  After 5pm or on weekends, please call  for physician on call      NAME: Karla Paulson   :  1980   MRN:  [id number]       Subjective:   He's doing better, shortness of breath at baseline, no abdominal pain. Had bloody BM, bleeding continues to be less. Objective:     VITALS:   Last 24hrs VS reviewed since prior progress note. Most recent are:  Visit Vitals  BP (!) 147/82 (BP 1 Location: Right lower arm, BP Patient Position: At rest)   Pulse 93   Temp 98.7 °F (37.1 °C)   Resp 18   Ht 5' 10\" (1.778 m)   Wt 148.5 kg (327 lb 6.4 oz)   SpO2 92%   BMI 46.98 kg/m²       PHYSICAL EXAM:  General: Cooperative, no acute distress    Neurologic:  Alert and oriented X 3. HEENT: EOMI, no scleral icterus   Lungs:  No increased WOB at rest, oxygen  Heart:  regular rate  Abdomen: Soft, obsese, non-distended, no tenderness. Extremities: warm  Psych:   Good insight. Not anxious or agitated.     Lab Data Reviewed:     Recent Results (from the past 24 hour(s))   CBC W/O DIFF    Collection Time: 22  5:02 AM   Result Value Ref Range    WBC 7.9 4.1 - 11.1 K/uL    RBC 3.95 (L) 4.10 - 5.70 M/uL    HGB 8.9 (L) 12.1 - 17.0 g/dL    HCT 32.8 (L) 36.6 - 50.3 %    MCV 83.0 80.0 - 99.0 FL    MCH 22.5 (L) 26.0 - 34.0 PG    MCHC 27.1 (L) 30.0 - 36.5 g/dL    RDW 18.1 (H) 11.5 - 14.5 %    PLATELET 470 701 - 591 K/uL    MPV 10.0 8.9 - 12.9 FL    NRBC 0.0 0  WBC    ABSOLUTE NRBC 0.00 0.00 - 9.51 K/uL   METABOLIC PANEL, COMPREHENSIVE    Collection Time: 22  5:02 AM   Result Value Ref Range    Sodium 141 136 - 145 mmol/L    Potassium 3.7 3.5 - 5.1 mmol/L    Chloride 100 97 - 108 mmol/L    CO2 37 (H) 21 - 32 mmol/L    Anion gap 4 (L) 5 - 15 mmol/L    Glucose 113 (H) 65 - 100 mg/dL    BUN 12 6 - 20 MG/DL    Creatinine 1.18 0.70 - 1.30 MG/DL    BUN/Creatinine ratio 10 (L) 12 - 20      eGFR >60 >60 ml/min/1.73m2    Calcium 8.0 (L) 8.5 - 10.1 MG/DL    Bilirubin, total 0.4 0.2 - 1.0 MG/DL    ALT (SGPT) 14 12 - 78 U/L    AST (SGOT) 16 15 - 37 U/L    Alk. phosphatase 37 (L) 45 - 117 U/L    Protein, total 7.6 6.4 - 8.2 g/dL    Albumin 2.5 (L) 3.5 - 5.0 g/dL    Globulin 5.1 (H) 2.0 - 4.0 g/dL    A-G Ratio 0.5 (L) 1.1 - 2.2              Assessment:     Rectal bleeding: hgb 8.9  CHF  Pneumonia   ANH  Obesity      Patient Active Problem List   Diagnosis Code    Chest pain R07.9    CHF exacerbation (HCC) I50.9     Plan:     Anticoagulation on hold  CLD, NPO midnight  Prep this evening  Plan for colonoscopy tomorrow, risks discussed and patient is agreeable      Signed By: Nicko Moore NP     11/1/2022  1:45 PM         This patient was seen and examined by me in a face-to-face visit today. I reviewed the medical record including lab work, imaging and other provider notes. I confirmed the interval history as described above. I spoke to the patient, discussing our findings and plans. I discussed this case in detail with Jacinta APPLE. I formulated an updated  assessment of this patient and guided our treatment plan. I agree with the above progress note. I agree with the history, exam and assessment and plan as outlined in the note. I would like to add the following:     Abd: normoactive BS, nd, nt, no rebound/guarding. Pulmonary: CTA B. Discussed risks of colonoscopy. He is aware he is at increased risk for complications due to his weight and systolic CHF. He wishes to proceed with the colonoscopy.     Dr. Jerri Lang

## 2022-11-02 ENCOUNTER — ANESTHESIA EVENT (OUTPATIENT)
Dept: ENDOSCOPY | Age: 42
DRG: 194 | End: 2022-11-02
Payer: MEDICAID

## 2022-11-02 ENCOUNTER — ANESTHESIA (OUTPATIENT)
Dept: ENDOSCOPY | Age: 42
DRG: 194 | End: 2022-11-02
Payer: MEDICAID

## 2022-11-02 LAB
ANION GAP SERPL CALC-SCNC: 10 MMOL/L (ref 5–15)
BUN SERPL-MCNC: 8 MG/DL (ref 6–20)
BUN/CREAT SERPL: 8 (ref 12–20)
CALCIUM SERPL-MCNC: 8.3 MG/DL (ref 8.5–10.1)
CHLORIDE SERPL-SCNC: 99 MMOL/L (ref 97–108)
CO2 SERPL-SCNC: 31 MMOL/L (ref 21–32)
CREAT SERPL-MCNC: 1.06 MG/DL (ref 0.7–1.3)
ERYTHROCYTE [DISTWIDTH] IN BLOOD BY AUTOMATED COUNT: 18.1 % (ref 11.5–14.5)
GLUCOSE SERPL-MCNC: 113 MG/DL (ref 65–100)
HCT VFR BLD AUTO: 34.1 % (ref 36.6–50.3)
HGB BLD-MCNC: 9.3 G/DL (ref 12.1–17)
MCH RBC QN AUTO: 22.9 PG (ref 26–34)
MCHC RBC AUTO-ENTMCNC: 27.3 G/DL (ref 30–36.5)
MCV RBC AUTO: 83.8 FL (ref 80–99)
NRBC # BLD: 0 K/UL (ref 0–0.01)
NRBC BLD-RTO: 0 PER 100 WBC
PLATELET # BLD AUTO: 351 K/UL (ref 150–400)
PMV BLD AUTO: 9.8 FL (ref 8.9–12.9)
POTASSIUM SERPL-SCNC: 3.8 MMOL/L (ref 3.5–5.1)
RBC # BLD AUTO: 4.07 M/UL (ref 4.1–5.7)
SODIUM SERPL-SCNC: 140 MMOL/L (ref 136–145)
WBC # BLD AUTO: 6.7 K/UL (ref 4.1–11.1)

## 2022-11-02 PROCEDURE — 65270000046 HC RM TELEMETRY

## 2022-11-02 PROCEDURE — 74011000250 HC RX REV CODE- 250: Performed by: INTERNAL MEDICINE

## 2022-11-02 PROCEDURE — 76040000019: Performed by: INTERNAL MEDICINE

## 2022-11-02 PROCEDURE — 74011250637 HC RX REV CODE- 250/637: Performed by: INTERNAL MEDICINE

## 2022-11-02 PROCEDURE — 85027 COMPLETE CBC AUTOMATED: CPT

## 2022-11-02 PROCEDURE — 74011250636 HC RX REV CODE- 250/636: Performed by: NURSE ANESTHETIST, CERTIFIED REGISTERED

## 2022-11-02 PROCEDURE — 74011000250 HC RX REV CODE- 250: Performed by: NURSE ANESTHETIST, CERTIFIED REGISTERED

## 2022-11-02 PROCEDURE — 0DJD8ZZ INSPECTION OF LOWER INTESTINAL TRACT, VIA NATURAL OR ARTIFICIAL OPENING ENDOSCOPIC: ICD-10-PCS | Performed by: INTERNAL MEDICINE

## 2022-11-02 PROCEDURE — 36415 COLL VENOUS BLD VENIPUNCTURE: CPT

## 2022-11-02 PROCEDURE — 74011250636 HC RX REV CODE- 250/636: Performed by: HOSPITALIST

## 2022-11-02 PROCEDURE — 76060000031 HC ANESTHESIA FIRST 0.5 HR: Performed by: INTERNAL MEDICINE

## 2022-11-02 PROCEDURE — 74011250637 HC RX REV CODE- 250/637: Performed by: HOSPITALIST

## 2022-11-02 PROCEDURE — 80048 BASIC METABOLIC PNL TOTAL CA: CPT

## 2022-11-02 RX ORDER — MIDAZOLAM HYDROCHLORIDE 1 MG/ML
INJECTION, SOLUTION INTRAMUSCULAR; INTRAVENOUS
Status: COMPLETED
Start: 2022-11-02 | End: 2022-11-02

## 2022-11-02 RX ORDER — SODIUM CHLORIDE 0.9 % (FLUSH) 0.9 %
5-40 SYRINGE (ML) INJECTION EVERY 8 HOURS
Status: DISCONTINUED | OUTPATIENT
Start: 2022-11-02 | End: 2022-11-03 | Stop reason: HOSPADM

## 2022-11-02 RX ORDER — ENOXAPARIN SODIUM 100 MG/ML
30 INJECTION SUBCUTANEOUS EVERY 12 HOURS
Status: DISCONTINUED | OUTPATIENT
Start: 2022-11-02 | End: 2022-11-03 | Stop reason: HOSPADM

## 2022-11-02 RX ORDER — FLUMAZENIL 0.1 MG/ML
0.2 INJECTION INTRAVENOUS
Status: DISCONTINUED | OUTPATIENT
Start: 2022-11-02 | End: 2022-11-02 | Stop reason: HOSPADM

## 2022-11-02 RX ORDER — EPINEPHRINE 0.1 MG/ML
1 INJECTION INTRACARDIAC; INTRAVENOUS
Status: DISCONTINUED | OUTPATIENT
Start: 2022-11-02 | End: 2022-11-02 | Stop reason: HOSPADM

## 2022-11-02 RX ORDER — SODIUM CHLORIDE 0.9 % (FLUSH) 0.9 %
5-40 SYRINGE (ML) INJECTION AS NEEDED
Status: DISCONTINUED | OUTPATIENT
Start: 2022-11-02 | End: 2022-11-03 | Stop reason: HOSPADM

## 2022-11-02 RX ORDER — PROPOFOL 10 MG/ML
INJECTION, EMULSION INTRAVENOUS AS NEEDED
Status: DISCONTINUED | OUTPATIENT
Start: 2022-11-02 | End: 2022-11-02 | Stop reason: HOSPADM

## 2022-11-02 RX ORDER — NALOXONE HYDROCHLORIDE 0.4 MG/ML
0.4 INJECTION, SOLUTION INTRAMUSCULAR; INTRAVENOUS; SUBCUTANEOUS
Status: DISCONTINUED | OUTPATIENT
Start: 2022-11-02 | End: 2022-11-02 | Stop reason: HOSPADM

## 2022-11-02 RX ORDER — SODIUM CHLORIDE, SODIUM LACTATE, POTASSIUM CHLORIDE, CALCIUM CHLORIDE 600; 310; 30; 20 MG/100ML; MG/100ML; MG/100ML; MG/100ML
INJECTION, SOLUTION INTRAVENOUS
Status: DISCONTINUED | OUTPATIENT
Start: 2022-11-02 | End: 2022-11-02 | Stop reason: HOSPADM

## 2022-11-02 RX ORDER — ATROPINE SULFATE 0.1 MG/ML
0.5 INJECTION INTRAVENOUS
Status: DISCONTINUED | OUTPATIENT
Start: 2022-11-02 | End: 2022-11-02 | Stop reason: HOSPADM

## 2022-11-02 RX ORDER — MIDAZOLAM HYDROCHLORIDE 1 MG/ML
INJECTION, SOLUTION INTRAMUSCULAR; INTRAVENOUS AS NEEDED
Status: DISCONTINUED | OUTPATIENT
Start: 2022-11-02 | End: 2022-11-02 | Stop reason: HOSPADM

## 2022-11-02 RX ORDER — DEXTROMETHORPHAN/PSEUDOEPHED 2.5-7.5/.8
1.2 DROPS ORAL
Status: DISCONTINUED | OUTPATIENT
Start: 2022-11-02 | End: 2022-11-02 | Stop reason: HOSPADM

## 2022-11-02 RX ORDER — LIDOCAINE HYDROCHLORIDE 20 MG/ML
INJECTION, SOLUTION EPIDURAL; INFILTRATION; INTRACAUDAL; PERINEURAL AS NEEDED
Status: DISCONTINUED | OUTPATIENT
Start: 2022-11-02 | End: 2022-11-02 | Stop reason: HOSPADM

## 2022-11-02 RX ADMIN — SODIUM CHLORIDE, POTASSIUM CHLORIDE, SODIUM LACTATE AND CALCIUM CHLORIDE: 600; 310; 30; 20 INJECTION, SOLUTION INTRAVENOUS at 12:12

## 2022-11-02 RX ADMIN — PROPOFOL 50 MG: 10 INJECTION, EMULSION INTRAVENOUS at 12:22

## 2022-11-02 RX ADMIN — SODIUM CHLORIDE, PRESERVATIVE FREE 10 ML: 5 INJECTION INTRAVENOUS at 21:32

## 2022-11-02 RX ADMIN — TORSEMIDE 100 MG: 100 TABLET ORAL at 08:24

## 2022-11-02 RX ADMIN — LIDOCAINE HYDROCHLORIDE 40 MG: 20 INJECTION, SOLUTION EPIDURAL; INFILTRATION; INTRACAUDAL; PERINEURAL at 12:22

## 2022-11-02 RX ADMIN — PROPOFOL 20 MG: 10 INJECTION, EMULSION INTRAVENOUS at 12:23

## 2022-11-02 RX ADMIN — ENOXAPARIN SODIUM 30 MG: 100 INJECTION SUBCUTANEOUS at 21:32

## 2022-11-02 RX ADMIN — EMPAGLIFLOZIN 10 MG: 10 TABLET, FILM COATED ORAL at 08:24

## 2022-11-02 RX ADMIN — MIDAZOLAM HYDROCHLORIDE 2 MG: 1 INJECTION, SOLUTION INTRAMUSCULAR; INTRAVENOUS at 12:21

## 2022-11-02 RX ADMIN — SODIUM CHLORIDE, PRESERVATIVE FREE 10 ML: 5 INJECTION INTRAVENOUS at 17:25

## 2022-11-02 RX ADMIN — TORSEMIDE 100 MG: 100 TABLET ORAL at 17:25

## 2022-11-02 RX ADMIN — PANTOPRAZOLE SODIUM 40 MG: 40 TABLET, DELAYED RELEASE ORAL at 08:24

## 2022-11-02 RX ADMIN — POLYETHYLENE GLYCOL 3350, SODIUM SULFATE ANHYDROUS, SODIUM BICARBONATE, SODIUM CHLORIDE, POTASSIUM CHLORIDE 2000 ML: 236; 22.74; 6.74; 5.86; 2.97 POWDER, FOR SOLUTION ORAL at 01:17

## 2022-11-02 RX ADMIN — MIDAZOLAM HYDROCHLORIDE 3 MG: 1 INJECTION, SOLUTION INTRAMUSCULAR; INTRAVENOUS at 12:20

## 2022-11-02 RX ADMIN — PROPOFOL 10 MG: 10 INJECTION, EMULSION INTRAVENOUS at 12:24

## 2022-11-02 NOTE — PROGRESS NOTES
Bedside shift change report given to [facility] (oncoming nurse) by SHEREEN Foley (offgoing nurse). Report included the following information SBAR, Kardex, Procedure Summary, MAR, and Recent Results.

## 2022-11-02 NOTE — PROGRESS NOTES
Transition of Care  RUR 9% Low  Disposition Home   DME ? Home Oxygen   Transportation Family   Follow Up PCP, Specialist    CM continuing to follow for transitional care planning needs. Care management to continue to monitor.     Harriet Escobedo MS

## 2022-11-02 NOTE — PROCEDURES
Chandler Moreno [address] Fraser M.D.  [address] [facility], [address] [place] [address]  [phone]               Colonoscopy Procedure Note    NAME: Jacquelin Franks  :  1980  MRN:  [id number]    Indications:   Lower GI bleeding     : Angeles Nelson MD    Referring Provider:  Unknown, Provider, MD    Staff: Endoscopy Wayne Mendozabs: Christi Shabazz  Endoscopy RN-1: Ovi Joiner RN    Prosthetic devices, grafts, tissues, transplant, or devices implanted: none    Medicines:  MAC anesthesia      Procedure Details:  After informed consent was obtained with all risks and benefits of the procedure explained and preprocedure exam completed, the patient was placed in the left lateral decubitus position. Universal protocol for patient identification was performed and documented in the nursing notes. Throughout the procedure, the patient's blood pressure was monitored at least every five minutes; pulse, and oxygen saturations were monitored continuously. All vital signs were documented in the nursing notes. A digital rectal exam was performed and was normal.  The Olympus videocolonoscope  was inserted in the rectum and carefully advanced to the terminal ileum. The colonoscope was slowly withdrawn with careful evaluation between folds. Retroflexion in the rectum was performed; findings and interventions are described below. Procedure start time, extent reached time/cecum time, and procedure end time are documented in the nursing notes. The quality of preparation was inadequate. Findings:   Normal TI  Prep was fair-brown stool  Moderate internal hemorrhoids as the likely cause of bleed  No mass or large polyps seen    Interventions:    none    Specimens:   * No specimens in log *    EBL:  None. Complications:   No immediate complications     Impression:  -See post-procedure diagnoses.      Recommendations:   Repeat colonoscopy at age 37 at [address] [place] [address] once his acute cardiac issues are dealt with  Resume diet  Will sign off. Please call with any questions. Signed by:  Irvin Koehler MD          11/2/2022  12:40 PM

## 2022-11-02 NOTE — ANESTHESIA PREPROCEDURE EVALUATION
Relevant Problems   CARDIOVASCULAR   (+) CHF exacerbation (HCC)       Anesthetic History   No history of anesthetic complications            Review of Systems / Medical History  Patient summary reviewed, nursing notes reviewed and pertinent labs reviewed    Pulmonary          Undiagnosed apnea  Asthma        Neuro/Psych   Within defined limits           Cardiovascular    Hypertension      CHF: orthopnea, PND        Exercise tolerance: >4 METS  Comments:  ECHO 10/27/22:  Left Ventricle: Severely reduced left ventricular systolic function with a visually estimated EF of 20 - 25%. Left ventricle is moderately dilated. Severe septal thickening. Findings consistent with severe concentric hypertrophy. Severe global hypokinesis present.   Right Ventricle: Not well visualized.   Mitral Valve: Mild regurgitation.      GI/Hepatic/Renal  Within defined limits              Endo/Other        Morbid obesity     Other Findings              Physical Exam    Airway  Mallampati: II  TM Distance: 4 - 6 cm  Neck ROM: normal range of motion   Mouth opening: Normal     Cardiovascular    Rhythm: regular  Rate: normal         Dental  No notable dental hx       Pulmonary  Breath sounds clear to auscultation               Abdominal  GI exam deferred       Other Findings            Anesthetic Plan    ASA: 3  Anesthesia type: MAC          Induction: Intravenous  Anesthetic plan and risks discussed with: Patient

## 2022-11-02 NOTE — PROGRESS NOTES

## 2022-11-02 NOTE — H&P
[address], [address] [place] [address]          Pre-procedure History and Physical       NAME:  Mima Galeazzi   :   1980   MRN:   [id number]     CHIEF COMPLAINT/HPI: lgib    PMH:  Past Medical History:   Diagnosis Date    Asthma     Gastrointestinal disorder     ulcers    Hypertension        PSH:  Past Surgical History:   Procedure Laterality Date    HX ORTHOPAEDIC      R hand repair       Allergies:  No Known Allergies    Home Medications:  Prior to Admission Medications   Prescriptions Last Dose Informant Patient Reported? Taking? Entresto 49-51 mg tab tablet  Self Yes Yes   Sig: TAKE 1 TABLET BY MOUTH TWICE DAILY. STOP TAKING LISINOPRIL   Farxiga 5 mg tab tablet 2022 Self Yes Yes   Sig: Take 5 mg by mouth daily. albuterol (PROVENTIL HFA, VENTOLIN HFA, PROAIR HFA) 90 mcg/actuation inhaler  Self No Yes   Sig: Take 2 Puffs by inhalation every six (6) hours as needed for Wheezing. carvediloL (COREG) 3.125 mg tablet 2022 Self Yes Yes   Sig: Take 3.125 mg by mouth two (2) times a day. fluticasone propionate (FLONASE) 50 mcg/actuation nasal spray  Self Yes Yes   Si Sprays by Both Nostrils route daily as needed. spironolactone (ALDACTONE) 25 mg tablet  Self Yes Yes   Sig: Take 25 mg by mouth two (2) times a day.    torsemide (DEMADEX) 20 mg tablet  Self Yes Yes   Sig: TAKE 5 TABLETS BY MOUTH TWICE DAILY      Facility-Administered Medications: None       Hospital Medications:  Current Facility-Administered Medications   Medication Dose Route Frequency    midazolam (VERSED) 1 mg/mL injection        torsemide (DEMADEX) tablet 100 mg  100 mg Oral BID    pantoprazole (PROTONIX) tablet 40 mg  40 mg Oral ACB    [Held by provider] enoxaparin (LOVENOX) injection 30 mg  30 mg SubCUTAneous Q12H    sodium chloride (NS) flush 5-40 mL  5-40 mL IntraVENous Q8H    sodium chloride (NS) flush 5-40 mL  5-40 mL IntraVENous PRN    acetaminophen (TYLENOL) tablet 650 mg  650 mg Oral Q6H PRN    Or    acetaminophen (TYLENOL) suppository 650 mg  650 mg Rectal Q6H PRN    polyethylene glycol (MIRALAX) packet 17 g  17 g Oral DAILY PRN    ondansetron (ZOFRAN ODT) tablet 4 mg  4 mg Oral Q8H PRN    Or    ondansetron (ZOFRAN) injection 4 mg  4 mg IntraVENous Q6H PRN    [Held by provider] carvediloL (COREG) tablet 3.125 mg  3.125 mg Oral BID    [Held by provider] sacubitriL-valsartan (ENTRESTO) 49-51 mg tablet 1 Tablet  1 Tablet Oral Q12H    empagliflozin (JARDIANCE) tablet 10 mg  10 mg Oral DAILY    albuterol-ipratropium (DUO-NEB) 2.5 MG-0.5 MG/3 ML  3 mL Nebulization Q4H PRN       Family History:  Family History   Problem Relation Age of Onset    Hypertension Mother        Social History:  Social History     Tobacco Use    Smoking status: Every Day     Packs/day: 0.25     Types: Cigarettes    Smokeless tobacco: Not on file   Substance Use Topics    Alcohol use: No       The patient was counseled at length about the risks of inocente Covid-19 in the hollie-operative and post-operative states including the recovery window of their procedure. The patient was made aware that inocente Covid-19 after a surgical procedure may worsen their prognosis for recovering from the virus and lend to a higher morbidity and or mortality risk. The patient was given the options of postponing their procedure. All of the risks, benefits, and alternatives were discussed. The patient does  wish to proceed with the procedure. PHYSICAL EXAM PRIOR TO SEDATION:  General: Alert, in no acute distress    Lungs:            CTA bilaterally  Heart:  Normal S1, S2    Abdomen: Soft, Non distended, Non tender. Normoactive bowel sounds. Assessment:   Stable for sedation administration.   Date of last colonoscopy: none, Polyps  No    Plan:     Endoscopic procedure with sedation     Signed By: Dennis Jara MD     11/2/2022  12:16 PM

## 2022-11-02 NOTE — ANESTHESIA POSTPROCEDURE EVALUATION
Procedure(s):  COLONOSCOPY. MAC    Anesthesia Post Evaluation      Multimodal analgesia: multimodal analgesia used between 6 hours prior to anesthesia start to PACU discharge  Patient location during evaluation: PACU  Level of consciousness: awake  Pain management: adequate  Airway patency: patent  Anesthetic complications: no  Cardiovascular status: acceptable  Respiratory status: acceptable  Hydration status: acceptable  Post anesthesia nausea and vomiting:  none  Final Post Anesthesia Temperature Assessment:  Normothermia (36.0-37.5 degrees C)      INITIAL Post-op Vital signs:   Vitals Value Taken Time   /78 11/02/22 1305   Temp 36.3 °C (97.3 °F) 11/02/22 1248   Pulse 95 11/02/22 1306   Resp 22 11/02/22 1306   SpO2 97 % 11/02/22 1306   Vitals shown include unvalidated device data.

## 2022-11-02 NOTE — PROGRESS NOTES
2340: pt received entresto at 2113. Upon BP check at 2340, BP is 85/66 on third attempt. Danni Henry NP notified, orders received for one time dose of albumin. 0110: /86, will continue to monitor. 1762: Bedside and Verbal shift change report [facility] (oncoming nurse) by Jody Todd RN (offgoing nurse). Report included the following information SBAR, Kardex, ED Summary, Procedure Summary, Intake/Output, MAR, Recent Results, and Cardiac Rhythm NSR .

## 2022-11-02 NOTE — PROGRESS NOTES
Pt still on oxygen  Problem: Falls - Risk of  Goal: *Absence of Falls  Description: Document Cherylle Cockayne Fall Risk and appropriate interventions in the flowsheet.   Outcome: Progressing Towards Goal  Note: Fall Risk Interventions:            Medication Interventions: Evaluate medications/consider consulting pharmacy, Teach patient to arise slowly         History of Falls Interventions: Consult care management for discharge planning         Problem: Heart Failure: Day 5  Goal: Psychosocial  Outcome: Progressing Towards Goal

## 2022-11-02 NOTE — PROGRESS NOTES
[facility]                                                                                          Hospitalist Progress Note  James Min MD  Answering service: [phone] from in house phone        Date of Service:  2022  NAME:  Joyce Peres  :  1980  MRN:  [id number]      Admission Summary:     A 38year old male patient with PMH of CHF with EF 20%, HTN presented to ED for evaluation of worsening sob. Patient has been non complain with his medications - he only takes torsemide and stopped taking other meds as he feels that he gets amnesia/ memory impairment. He has been noticing worsening sob since 1 month. He was not able to sleep. + PND and orthopnea. He c/o worsening edema of legs and abdominal distension. He does have chronic productive cough. He also states that he has on/off chest pains. He denied fever, abd pain, urinary or bowel changes. In ED, pro BNP elevated , IV lasix 80mg given. Hospitalist consulted for admission  10/25/2022     Interval history / Subjective:     He said he feels better, he had colonoscopy today, no left side chest pain or shortness of breath     Assessment & Plan:     Acute on chronic systolic and diastolic CHF NYHA class IV on poa  Acute hypoxic respiratory failure   - pro BNP 4293  - CT: Mild groundglass opacity which may represent mild pulmonary edema    - echo on 10/27/2022 severely reduced left ventricular systolic function with estimated EF 20 to 25%, left ventricle is moderately dilated, severe septal thickening. Finding consistent with severe concentric hypertrophy.   Severe global hypokinesis present  -Monitor strict I&Os , daily weight   -IV Lasix switched to oral torsemide 100 mg p.o. twice daily, empagliflozine  -BP low normal last night, Coreg and Entresto on hold, monitor BP  - hold aldactone   - venous duplex: no DVT   - SpO2 84-98% on 2 l/m, monitor pulse ox, wean down oxygen  -Cardiologist on board     Fever on 10/26 possible due to aspiration PNA   -normal lactic, procalcitonin  -resp panel neg  -CTA chest: no PE. Small nodular airspace opacities in the right lower lobe may reflect aspiration or infection   -Completed IV zosyn      ANH possibly prerenal  -Creatinine 1.43 on poa - cr improved to 1.18  -Avoid nephrotoxin   -monitor renal function while patient on diuretics     HTN  -BP improved and normal on coreg, demadex,and Entresto  -midodine is discontinued and monitor blood pressure     Morbid obesity   -Body mass index is 44.48 kg/m². -Recommend weight loss     Possible GI bleed  -hem occult positive on 10/28  -no abdominal pain  -hold lovenox  -Hgb now Hgb 9.6  -consult GI  -GI on board and possible colonoscopy tomorrow     Suspect YONATAN  - sleep study as outpt        Code status: Full code  Prophylaxis: hold Lovenox, on SCD  Care Plan discussed with: Patient, nurse and CM  Anticipated Disposition: Home with home health care service in the next 24 to 48 hours     Hospital Problems  Never Reviewed            Codes Class Noted POA    CHF exacerbation ([facility] [address] ICD-10-CM: I50.9  ICD-9-CM: 428.0  10/25/2022 Unknown       Vital Signs:    Last 24hrs VS reviewed since prior progress note. Most recent are:  Visit Vitals  /78   Pulse 99   Temp 97.3 °F (36.3 °C)   Resp 25   Ht 5' 10\" (1.778 m)   Wt 148.9 kg (328 lb 4.2 oz)   SpO2 97%   BMI 47.10 kg/m²         Intake/Output Summary (Last 24 hours) at 11/2/2022 1435  Last data filed at 11/2/2022 1205  Gross per 24 hour   Intake 50 ml   Output 3425 ml   Net -3375 ml          Physical Examination:     I had a face to face encounter with this patient and independently examined them on 11/2/2022 as outlined below:          Constitutional:  No acute distress, cooperative, pleasant    ENT:  Oral mucosa moist, oropharynx benign. Resp: Decreased bronchial breath sounds to auscultation bilaterally. No wheezing/rhonchi/rales.  No accessory muscle use. CV:  Regular rhythm, normal rate, no murmurs, gallops, rubs    GI:  Soft, non distended, non tender. normoactive bowel sounds, no hepatosplenomegaly     Musculoskeletal: Bilateral pretibial and pedal.    Neurologic: Conscious in the last ,moves all extremities. AAOx3, CN II-XII reviewed            Data Review:    Review and/or order of clinical lab test  Review and/or order of tests in the radiology section of CPT  Review and/or order of tests in the medicine section of CPT      Labs:     Recent Labs     11/02/22 0111 11/01/22  0502   WBC 6.7 7.9   HGB 9.3* 8.9*   HCT 34.1* 32.8*    335       Recent Labs     11/02/22  0111 11/01/22  0502 10/31/22  0530    141 138   K 3.8 3.7 4.2   CL 99 100 99   CO2 31 37* 38*   BUN 8 12 13   CREA 1.06 1.18 1.28   * 113* 136*   CA 8.3* 8.0* 8.1*       Recent Labs     11/01/22  0502 10/31/22  0530   ALT 14 17   AP 37* 36*   TBILI 0.4 0.4   TP 7.6 8.5*   ALB 2.5* 2.6*   GLOB 5.1* 5.9*       No results for input(s): INR, PTP, APTT, INREXT, INREXT in the last 72 hours. No results for input(s): FE, TIBC, PSAT, FERR in the last 72 hours. No results found for: FOL, RBCF   No results for input(s): PH, PCO2, PO2 in the last 72 hours. No results for input(s): CPK, CKNDX, TROIQ in the last 72 hours.     No lab exists for component: CPKMB  No results found for: CHOL, CHOLX, CHLST, CHOLV, HDL, HDLP, LDL, LDLC, DLDLP, TGLX, TRIGL, TRIGP, CHHD, CHHDX  Lab Results   Component Value Date/Time    Glucose (POC) 99 12/04/2014 12:44 PM     No results found for: COLOR, APPRN, SPGRU, REFSG, CORINNA, PROTU, GLUCU, KETU, BILU, UROU, LALO, LEUKU, GLUKE, EPSU, BACTU, WBCU, RBCU, CASTS, UCRY      Medications Reviewed:     Current Facility-Administered Medications   Medication Dose Route Frequency    sodium chloride (NS) flush 5-40 mL  5-40 mL IntraVENous Q8H    sodium chloride (NS) flush 5-40 mL  5-40 mL IntraVENous PRN    enoxaparin (LOVENOX) injection 30 mg  30 mg SubCUTAneous Q12H    torsemide (DEMADEX) tablet 100 mg  100 mg Oral BID    pantoprazole (PROTONIX) tablet 40 mg  40 mg Oral ACB    sodium chloride (NS) flush 5-40 mL  5-40 mL IntraVENous Q8H    sodium chloride (NS) flush 5-40 mL  5-40 mL IntraVENous PRN    acetaminophen (TYLENOL) tablet 650 mg  650 mg Oral Q6H PRN    Or    acetaminophen (TYLENOL) suppository 650 mg  650 mg Rectal Q6H PRN    polyethylene glycol (MIRALAX) packet 17 g  17 g Oral DAILY PRN    ondansetron (ZOFRAN ODT) tablet 4 mg  4 mg Oral Q8H PRN    Or    ondansetron (ZOFRAN) injection 4 mg  4 mg IntraVENous Q6H PRN    [Held by provider] carvediloL (COREG) tablet 3.125 mg  3.125 mg Oral BID    [Held by provider] sacubitriL-valsartan (ENTRESTO) 49-51 mg tablet 1 Tablet  1 Tablet Oral Q12H    empagliflozin (JARDIANCE) tablet 10 mg  10 mg Oral DAILY    albuterol-ipratropium (DUO-NEB) 2.5 MG-0.5 MG/3 ML  3 mL Nebulization Q4H PRN     ______________________________________________________________________  EXPECTED LENGTH OF STAY: 3d 19h  ACTUAL LENGTH OF STAY:          8                 Colonel Madhu MD

## 2022-11-02 NOTE — PROGRESS NOTES
Comprehensive Nutrition Assessment    Type and Reason for Visit: Initial (LOS)    Nutrition Recommendations/Plan:   Will modify diet order to 2gm Na+        Malnutrition Assessment:  Malnutrition Status:  No malnutrition (11/02/22 1450)         Nutrition Assessment:    39 yo male admitted for CHF exacerbation. Pmhx: CHF with EF 20%, HTN. GI following for GI bleed. S/P colonoscopy today - showed hemorrhoids as likely cause of GI bleed. Diet resumed following colonoscopy. Spoke with pt at bedside. He reports fair PO intakes. Is used to only eating 1 meal/day at home. Here he has been eating 2 meals/day, breakfast and dinner. Provided pt with education on low Na+ diet. Pt states he has received education on this before and does look for lower Na+ options, does not add salt to his foods and limits fast food intake. Weight hx in EMR indicates 5% loss over last 3 months, not significant. Labs reviewed. Nutritionally Significant Medications:  Jardiance      Estimated Daily Nutrient Needs:  Energy Requirements Based On: Formula  Weight Used for Energy Requirements: Current  Energy (kcal/day): 2600 (MSJ x AF 1.3 - 500)  Weight Used for Protein Requirements: Current  Protein (g/day): 134 (0.9 gm/kg (20%))  Method Used for Fluid Requirements: 1 ml/kcal  Fluid (ml/day): 2600    Nutrition Related Findings:   Edema: 1+ generalized, 2+ BLE  Last BM: 11/02/22, Bloody    Wounds: None      Current Nutrition Therapies:  Diet: GI bland  Supplements: none  Meal intake: Patient Vitals for the past 168 hrs:   % Diet Eaten   10/27/22 1814 26 - 50%   10/27/22 1241 1 - 25%   10/26/22 1808 26 - 50%     Supplement intake: No data found. Nutrition Support: none      Anthropometric Measures:  Height: 5' 10\" (177.8 cm)  Ideal Body Weight (IBW): 166 lbs (75 kg)     Current Body Wt:  148.9 kg (328 lb 4.2 oz), 197.7 % IBW.  Standing scale  Current BMI (kg/m2): 47.1        Weight Adjustment: No adjustment                 BMI Category: Obese class 3 (BMI 40.0 or greater)    Wt Readings from Last 10 Encounters:   11/02/22 148.9 kg (328 lb 4.2 oz)   06/28/22 157.3 kg (346 lb 12.5 oz)   01/02/21 141 kg (310 lb 13.6 oz)   12/31/16 122 kg (269 lb)   06/23/16 112.5 kg (248 lb)   12/08/15 114.3 kg (252 lb)   11/29/15 117.9 kg (260 lb)   12/04/14 120.2 kg (265 lb)   04/27/11 104.3 kg (230 lb)   01/18/11 104.3 kg (230 lb)           Nutrition Diagnosis:   Overweight/obesity related to excessive energy intake as evidenced by BMI    Nutrition Interventions:   Food and/or Nutrient Delivery: Modify current diet  Nutrition Education/Counseling: Education completed  Coordination of Nutrition Care: No recommendation at this time       Goals:     Goals: within 7 days, PO intake 75% or greater       Nutrition Monitoring and Evaluation:   Behavioral-Environmental Outcomes: None identified  Food/Nutrient Intake Outcomes: Food and nutrient intake  Physical Signs/Symptoms Outcomes: Biochemical data, GI status, Weight    Discharge Planning:    Continue current diet    Lalit Schofield RD  Available via [employer]

## 2022-11-03 VITALS
HEART RATE: 80 BPM | DIASTOLIC BLOOD PRESSURE: 93 MMHG | RESPIRATION RATE: 21 BRPM | BODY MASS INDEX: 45.1 KG/M2 | SYSTOLIC BLOOD PRESSURE: 113 MMHG | WEIGHT: 315 LBS | TEMPERATURE: 98.6 F | OXYGEN SATURATION: 95 % | HEIGHT: 70 IN

## 2022-11-03 LAB
ALBUMIN SERPL-MCNC: 2.6 G/DL (ref 3.5–5)
ALBUMIN/GLOB SERPL: 0.4 {RATIO} (ref 1.1–2.2)
ALP SERPL-CCNC: 40 U/L (ref 45–117)
ALT SERPL-CCNC: 19 U/L (ref 12–78)
ANION GAP SERPL CALC-SCNC: 5 MMOL/L (ref 5–15)
AST SERPL-CCNC: 17 U/L (ref 15–37)
BILIRUB SERPL-MCNC: 0.4 MG/DL (ref 0.2–1)
BUN SERPL-MCNC: 9 MG/DL (ref 6–20)
BUN/CREAT SERPL: 7 (ref 12–20)
CALCIUM SERPL-MCNC: 8.7 MG/DL (ref 8.5–10.1)
CHLORIDE SERPL-SCNC: 94 MMOL/L (ref 97–108)
CO2 SERPL-SCNC: 36 MMOL/L (ref 21–32)
CREAT SERPL-MCNC: 1.26 MG/DL (ref 0.7–1.3)
ERYTHROCYTE [DISTWIDTH] IN BLOOD BY AUTOMATED COUNT: 17.9 % (ref 11.5–14.5)
GLOBULIN SER CALC-MCNC: 5.9 G/DL (ref 2–4)
GLUCOSE SERPL-MCNC: 102 MG/DL (ref 65–100)
HCT VFR BLD AUTO: 33.3 % (ref 36.6–50.3)
HGB BLD-MCNC: 9.1 G/DL (ref 12.1–17)
MCH RBC QN AUTO: 22.4 PG (ref 26–34)
MCHC RBC AUTO-ENTMCNC: 27.3 G/DL (ref 30–36.5)
MCV RBC AUTO: 82 FL (ref 80–99)
NRBC # BLD: 0 K/UL (ref 0–0.01)
NRBC BLD-RTO: 0 PER 100 WBC
PLATELET # BLD AUTO: 404 K/UL (ref 150–400)
PMV BLD AUTO: 10 FL (ref 8.9–12.9)
POTASSIUM SERPL-SCNC: 3.8 MMOL/L (ref 3.5–5.1)
PROT SERPL-MCNC: 8.5 G/DL (ref 6.4–8.2)
RBC # BLD AUTO: 4.06 M/UL (ref 4.1–5.7)
SODIUM SERPL-SCNC: 135 MMOL/L (ref 136–145)
WBC # BLD AUTO: 7.9 K/UL (ref 4.1–11.1)

## 2022-11-03 PROCEDURE — 74011250637 HC RX REV CODE- 250/637: Performed by: INTERNAL MEDICINE

## 2022-11-03 PROCEDURE — 77010033678 HC OXYGEN DAILY

## 2022-11-03 PROCEDURE — 36415 COLL VENOUS BLD VENIPUNCTURE: CPT

## 2022-11-03 PROCEDURE — 80053 COMPREHEN METABOLIC PANEL: CPT

## 2022-11-03 PROCEDURE — 85027 COMPLETE CBC AUTOMATED: CPT

## 2022-11-03 PROCEDURE — 74011250636 HC RX REV CODE- 250/636: Performed by: HOSPITALIST

## 2022-11-03 PROCEDURE — 74011250637 HC RX REV CODE- 250/637: Performed by: HOSPITALIST

## 2022-11-03 RX ORDER — SPIRONOLACTONE 25 MG/1
25 TABLET ORAL 2 TIMES DAILY
Qty: 60 TABLET | Refills: 0 | Status: SHIPPED | OUTPATIENT
Start: 2022-11-03 | End: 2022-12-03

## 2022-11-03 RX ADMIN — CARVEDILOL 3.12 MG: 3.12 TABLET, FILM COATED ORAL at 08:22

## 2022-11-03 RX ADMIN — PANTOPRAZOLE SODIUM 40 MG: 40 TABLET, DELAYED RELEASE ORAL at 06:59

## 2022-11-03 RX ADMIN — EMPAGLIFLOZIN 10 MG: 10 TABLET, FILM COATED ORAL at 08:22

## 2022-11-03 RX ADMIN — SACUBITRIL AND VALSARTAN 1 TABLET: 49; 51 TABLET, FILM COATED ORAL at 08:22

## 2022-11-03 RX ADMIN — ENOXAPARIN SODIUM 30 MG: 100 INJECTION SUBCUTANEOUS at 08:22

## 2022-11-03 RX ADMIN — CARVEDILOL 3.12 MG: 3.12 TABLET, FILM COATED ORAL at 17:40

## 2022-11-03 RX ADMIN — TORSEMIDE 100 MG: 100 TABLET ORAL at 17:40

## 2022-11-03 RX ADMIN — TORSEMIDE 100 MG: 100 TABLET ORAL at 08:22

## 2022-11-03 NOTE — PROGRESS NOTES
Bedside and Verbal shift change report given to Manuelito López RN (oncoming nurse) by Elaine Mendoza RN (offgoing nurse). Report included the following information SBAR, Kardex, ED Summary, Procedure Summary, Intake/Output, MAR, Recent Results, and Cardiac Rhythm NSR .

## 2022-11-03 NOTE — PROGRESS NOTES
Transition of Care  RUR 10% Low  Disposition Home  DME Home Oxygen  Transportation Family   Follow Up PCP, Specialist    CM received home oxygen order and placed referral to [facility]. Patient is planned for DC home today. CM to monitor. Colby Cross MS    4:25 portable oxygen tank delivered to patient's bedside. CM participated in [facility] Discharge with bedside nursing and   attending, that includes: Follow up appointments with PCP or specialist  Review of medications  Dispatch health information  Education on symptom management    All questions answered and patient concerns addressed SMAART-E checklist completed and placed in appropriate folder.     Chance Cunningham MS

## 2022-11-03 NOTE — PROGRESS NOTES
Hospital follow-up PCP transitional care appointment has been scheduled with Dr. Yong Mckeon for Thursday, November 10th, 2022 at 1:20 p.m. Pending patient discharge.   Princess Gregory Care Management Assistant

## 2022-11-03 NOTE — PROGRESS NOTES
Problem: Falls - Risk of  Goal: *Absence of Falls  Description: Document Margie Ferrogenesis Fall Risk and appropriate interventions in the flowsheet.   Outcome: Progressing Towards Goal  Note: Fall Risk Interventions:            Medication Interventions: Evaluate medications/consider consulting pharmacy, Patient to call before getting OOB, Teach patient to arise slowly         History of Falls Interventions: Evaluate medications/consider consulting pharmacy         Problem: Heart Failure: Day 5  Goal: Discharge Planning  Outcome: Progressing Towards Goal  Goal: Medications  Outcome: Progressing Towards Goal  Goal: Psychosocial  Outcome: Progressing Towards Goal

## 2022-11-03 NOTE — DISCHARGE SUMMARY
Physician Discharge Summary     Patient ID:    Karla Paulson  [id number]  : 1980    Admit date: 10/25/2022    Discharge date and time: 11/3/2022    Hospital Diagnoses and Treatment Rendered:   HPI:  Patient is a 38year old male patient with PMHx of CHF with systolic dysfunction (EF 58%), HTN, and obesity, who presented to ED with chief complaint of worsening shortness of breath. Patient has been non compliant with his medications - he only takes torsemide and stopped taking other meds as he felt that he was getting amnesia/ memory impairment from them. Shortness of breath has been getting progressively worse over the past month, he was not able to sleep at night, unable to lay flat. He notes worsening edema of legs and abdominal distension. He reports a chronic productive cough. He reports occasional chest pains. He denied fever, abd pain, urinary or bowel changes. In ED, pro BNP was elevated, patient was dyspneic, he received a dose of IV lasix 80mg. He was admitted under the Hospitalist service for further work-up and management. HOSPITAL COURSE:   Acute on chronic systolic and diastolic CHF NYHA class IV on poa  Acute hypoxic respiratory failure   - pro BNP 4293  - CT: Mild groundglass opacity which may represent mild pulmonary edema    - echo on 10/27/2022 severely reduced left ventricular systolic function with estimated EF 20 to 25%, left ventricle is moderately dilated, severe septal thickening. Finding consistent with severe concentric hypertrophy.   Severe global hypokinesis present  -Monitor strict I&Os , daily weight   -IV Lasix switched to oral torsemide 100 mg p.o. twice daily;  - empagliflozine;  - BP low normal last night, Coreg and Entresto on hold, monitor BP;  - hold aldactone   - venous duplex: no DVT;  - SpO2 84-98% on 2 l/m, monitor pulse ox, wean down oxygen  - Cardiology consulted and following;   - BP improved: resume all cardiac meds, see list below; Fever on 10/26 possible due to aspiration PNA   -normal lactic, procalcitonin  -resp panel neg  -CTA chest: no PE. Small nodular airspace opacities in the right lower lobe may reflect aspiration or infection   -Completed IV zosyn;  - afebrile at discharge;     ANH, possibly prerenal  -Creatinine 1.43 on poa - cr improved to 1.18  -BUN/Cr stable;   - outpatient follow-up with PCP to monitor labs;     HTN  -BP improved and normal on coreg, demadex,and Entresto  -midodrine was discontinued;  - cardiac meds restarted; Morbid obesity   -Body mass index is 44.48 kg/m². -Recommend weight loss     Possibly GI bleed  -hem occult positive on 10/28  -no abdominal pain  -Lovenox held;   -consulted GI: colonoscopy showed internal hemorrhoids, thought to be the source of bleeding; Suspect YONATAN:  - based on body habitus and daytime somnolence;   - recommend sleep study as outpt;     OHS:  - patient needs O2 at discharge (continuous via NC at 2 liters/min), arranged prior to discharge;       Chronic Diagnoses:    Problem List as of 11/3/2022 Never Reviewed            Codes Class Noted - Resolved    CHF exacerbation ([facility] [address] ICD-10-CM: I50.9  ICD-9-CM: 428.0  10/25/2022 - Present        Chest pain ICD-10-CM: R07.9  ICD-9-CM: 786.50  1/2/2021 - Present           Discharge Medications:   Discharge Medication List as of 11/3/2022  5:35 PM        CONTINUE these medications which have CHANGED    Details   spironolactone (ALDACTONE) 25 mg tablet Take 1 Tablet by mouth two (2) times a day for 30 days. Indications: heart failure with reduced ejection fraction, Print, Disp-60 Tablet, R-0Hold until reevaluated by Cardiology and repeat labwork done.            CONTINUE these medications which have NOT CHANGED    Details   fluticasone propionate (FLONASE) 50 mcg/actuation nasal spray 2 Sprays by Both Nostrils route daily as needed., Historical Med      carvediloL (COREG) 3.125 mg tablet Take 3.125 mg by mouth two (2) times a day., Historical Med      Farxiga 5 mg tab tablet Take 5 mg by mouth daily. , Historical Med, ANDRE      Entresto 49-51 mg tab tablet TAKE 1 TABLET BY MOUTH TWICE DAILY. STOP TAKING LISINOPRIL, Historical Med, ANDRE      torsemide (DEMADEX) 20 mg tablet TAKE 5 TABLETS BY MOUTH TWICE DAILY, Historical Med      albuterol (PROVENTIL HFA, VENTOLIN HFA, PROAIR HFA) 90 mcg/actuation inhaler Take 2 Puffs by inhalation every six (6) hours as needed for Wheezing., Print, Disp-1 Inhaler, R-1               Follow up Care:    1. Unknown, Provider, MD in 1-2 weeks. Please call to set up an appointment shortly after discharge. Diet:  Cardiac Diet and Limit calorie intake to less than 2000 to promote weight loss;   - low salt, low cholesterol;    Disposition:  Home. Advanced Directive:   FULL x   DNR      Discharge Exam:    Constitutional:  No acute distress, cooperative, pleasant    ENT:  Oral mucosa moist, oropharynx benign. Resp: Decreased bronchial breath sounds to auscultation bilaterally. No wheezing/rhonchi/rales. No accessory muscle use. Diminished breath sounds at both bases posteriorly;    CV:  Regular rhythm, normal rate, no murmurs, gallops, rubs    GI:  Soft, non distended, non tender. normoactive bowel sounds, obese;    Musculoskeletal: Bilateral pretibial and pedal.    Neurologic: Awake, AOx3, moves all 4 extremities, no focal neuro deficits;        CONSULTATIONS: Cardiology and GI    Significant Diagnostic Studies:   10/25/2022: BUN 11 MG/DL (Ref range: 6 - 20 MG/DL); Calcium 8.3 MG/DL (L; Ref range: 8.5 - 10.1 MG/DL); CO2 31 mmol/L (Ref range: 21 - 32 mmol/L); Creatinine 1.43 MG/DL (H; Ref range: 0.70 - 1.30 MG/DL); Glucose 118 mg/dL (H; Ref range: 65 - 100 mg/dL); HCT 37.1 % (Ref range: 36.6 - 50.3 %); HGB 10.3 g/dL (L; Ref range: 12.1 - 17.0 g/dL); Potassium 3.5 mmol/L (Ref range: 3.5 - 5.1 mmol/L);  Sodium 133 mmol/L (L; Ref range: 136 - 145 mmol/L)  10/26/2022: BUN 17 MG/DL (Ref range: 6 - 20 MG/DL); Calcium 7.9 MG/DL (L; Ref range: 8.5 - 10.1 MG/DL); CO2 33 mmol/L (H; Ref range: 21 - 32 mmol/L); Creatinine 1.26 MG/DL (Ref range: 0.70 - 1.30 MG/DL); Glucose 120 mg/dL (H; Ref range: 65 - 100 mg/dL); HCT 36.6 % (Ref range: 36.6 - 50.3 %); HGB 10.4 g/dL (L; Ref range: 12.1 - 17.0 g/dL); Potassium 3.9 mmol/L (Ref range: 3.5 - 5.1 mmol/L); Sodium 133 mmol/L (L; Ref range: 136 - 145 mmol/L)  Recent Labs     11/03/22 0144 11/02/22  0111   WBC 7.9 6.7   HGB 9.1* 9.3*   HCT 33.3* 34.1*   * 351     Recent Labs     11/03/22 0144 11/02/22  0111 11/01/22  0502   * 140 141   K 3.8 3.8 3.7   CL 94* 99 100   CO2 36* 31 37*   BUN 9 8 12   CREA 1.26 1.06 1.18   * 113* 113*   CA 8.7 8.3* 8.0*     Recent Labs     11/03/22 0144 11/01/22  0502   AP 40* 37*   TP 8.5* 7.6   ALB 2.6* 2.5*   GLOB 5.9* 5.1*     No results for input(s): INR, PTP, APTT, INREXT in the last 72 hours. No results for input(s): FE, TIBC, PSAT, FERR in the last 72 hours. No results for input(s): PH, PCO2, PO2 in the last 72 hours. No results for input(s): CPK, CKMB in the last 72 hours.     No lab exists for component: TROPONINI  No components found for: [place]      Discharge time: >35 minutes;     Signed:  Thania Zarate MD  11/3/2022  12:29 PM   .

## 2022-11-03 NOTE — PROGRESS NOTES
Pulse oximetry assessment   92% at rest on room air (if 88% or less, skip next steps)  85% while ambulating on room air  97% at rest on 2LPM  93% while ambulating on 2LPM

## 2022-11-04 ENCOUNTER — TELEPHONE (OUTPATIENT)
Dept: CASE MANAGEMENT | Age: 42
End: 2022-11-04

## 2022-11-04 NOTE — TELEPHONE ENCOUNTER
HF NN attempted to reach patient for post hospital follow up phone call. Did not leave message as voicemail did not identify owner. Will try to call again at another time.

## 2022-11-07 ENCOUNTER — TELEPHONE (OUTPATIENT)
Dept: CASE MANAGEMENT | Age: 42
End: 2022-11-07

## 2023-03-30 ENCOUNTER — OFFICE VISIT (OUTPATIENT)
Dept: INTERNAL MEDICINE CLINIC | Age: 43
End: 2023-03-30
Payer: MEDICAID

## 2023-03-30 VITALS
BODY MASS INDEX: 45.1 KG/M2 | RESPIRATION RATE: 20 BRPM | OXYGEN SATURATION: 99 % | WEIGHT: 315 LBS | TEMPERATURE: 97.9 F | SYSTOLIC BLOOD PRESSURE: 128 MMHG | HEIGHT: 70 IN | HEART RATE: 87 BPM | DIASTOLIC BLOOD PRESSURE: 72 MMHG

## 2023-03-30 DIAGNOSIS — Z76.89 ENCOUNTER TO ESTABLISH CARE: Primary | ICD-10-CM

## 2023-03-30 DIAGNOSIS — Z99.81 OXYGEN DEPENDENT: ICD-10-CM

## 2023-03-30 DIAGNOSIS — Z11.59 NEED FOR HEPATITIS C SCREENING TEST: ICD-10-CM

## 2023-03-30 PROCEDURE — 99204 OFFICE O/P NEW MOD 45 MIN: CPT

## 2023-03-30 RX ORDER — SPIRONOLACTONE 25 MG/1
50 TABLET ORAL DAILY
COMMUNITY
Start: 2023-02-24 | End: 2024-02-24

## 2023-03-30 RX ORDER — IPRATROPIUM BROMIDE AND ALBUTEROL SULFATE 2.5; .5 MG/3ML; MG/3ML
3 SOLUTION RESPIRATORY (INHALATION) 4 TIMES DAILY
COMMUNITY
Start: 2023-02-22 | End: 2024-02-22

## 2023-03-30 RX ORDER — TORSEMIDE 100 MG/1
100 TABLET ORAL 2 TIMES DAILY
COMMUNITY

## 2023-03-30 NOTE — PROGRESS NOTES
Cathy Rivera is a 37 y.o. male , new patient, here for evaluation of the following chief complaint(s): Establish Care     Subjective:    Patient has a heart failure and follows Cardiology [facility] from [facility]. States that he was on oxygen since oct 2022, continues 2-1/2 to 3 L oxygen, patient wants a letter to the supplier saying he needs a portable oxygen machine. Patient does not have any pulmonologist.    Patient is taking Farxiga 10 mg daily, was taking 5 mg from October last year and increased to 10 mg this February by his cardiologist, he states that he is taking it for heart failure, last A1c on February 6.4. Patient denies polyuria, polyphagia, polydipsia, dizziness, headache, blurry vision, short of breath and chest pain. Review of Systems  Constitutional: negative for fevers, chills, anorexia and weight loss  Eyes:   negative for visual disturbance and irritation  ENT:   negative for tinnitus,sore throat,nasal congestion,ear pains. hoarseness  Respiratory:  negative for cough, hemoptysis, dyspnea,wheezing  CV:   negative for chest pain, palpitations, lower extremity edema  GI:   negative for nausea, vomiting, diarrhea, abdominal pain,melena  Endo:               negative for heat intolerance  Genitourinary: negative for frequency, dysuria and hematuria  Integument:  negative for rash and pruritus  Hematologic:  negative for easy bruising and gum/nose bleeding  Musculoskel: negative for myalgias, arthralgias, back pain, muscle weakness  Neurological:  negative for vertigo, memory problems and gait   Behavl/Psych: negative for feelings of anxiety, depression, mood changes    Past Medical History:   Diagnosis Date    Asthma     Gastrointestinal disorder     ulcers    Hypertension      Past Surgical History:   Procedure Laterality Date    COLONOSCOPY N/A 11/2/2022    COLONOSCOPY performed by Aashish Pavon MD at [facility] ENDOSCOPY    HX ORTHOPAEDIC      R hand repair     Social History     Socioeconomic History    Marital status: SINGLE   Tobacco Use    Smoking status: Former     Packs/day: 0.25     Types: Cigarettes     Passive exposure: Past   Substance and Sexual Activity    Alcohol use: Yes     Comment: occassional    Drug use: Yes     Comment: edibles     Family History   Problem Relation Age of Onset    Hypertension Mother      Current Outpatient Medications   Medication Sig Dispense Refill    albuterol-ipratropium (DUO-NEB) 2.5 mg-0.5 mg/3 ml nebu 3 mL four (4) times daily. spironolactone (ALDACTONE) 25 mg tablet Take 50 mg by mouth daily. dapagliflozin (Farxiga) 10 mg tab tablet Take 10 mg by mouth daily. torsemide (DEMADEX) 100 mg tablet Take 100 mg by mouth two (2) times a day. carvediloL (COREG) 3.125 mg tablet Take 3.125 mg by mouth two (2) times a day. Entresto 49-51 mg tab tablet TAKE 1 TABLET BY MOUTH TWICE DAILY. STOP TAKING LISINOPRIL      albuterol (PROVENTIL HFA, VENTOLIN HFA, PROAIR HFA) 90 mcg/actuation inhaler Take 2 Puffs by inhalation every six (6) hours as needed for Wheezing.  1 Inhaler 1     No Known Allergies    3 most recent PHQ Screens 3/30/2023   Little interest or pleasure in doing things Not at all   Feeling down, depressed, irritable, or hopeless Not at all   Total Score PHQ 2 0     Objective:  Visit Vitals  /72 (BP 1 Location: Left upper arm, BP Patient Position: Sitting, BP Cuff Size: Small adult)   Pulse 87   Temp 97.9 °F (36.6 °C) (Oral)   Resp 20   Ht 5' 10\" (1.778 m)   Wt 330 lb (149.7 kg)   SpO2 99%   BMI 47.35 kg/m²     Physical Exam:   General appearance - alert, well appearing, and in no distress  Mental status - alert, oriented to person, place, and time  Chest - clear to auscultation, no wheezes, rales or rhonchi, symmetric air entry   Heart - normal rate, regular rhythm, normal S1, S2, no murmurs, rubs, clicks or gallops   Abdomen - soft, nontender, nondistended, no masses or organomegaly  Lymph- no adenopathy palpable  Ext-peripheral pulses normal, no pedal edema, no clubbing or cyanosis  Skin-Warm and dry. no hyperpigmentation, vitiligo, or suspicious lesions  Neuro -alert, oriented, normal speech, no focal findings or movement disorder noted    Assessment/Plan:    Medication Side Effects and Warnings were discussed with patient: yes   Patient Labs were reviewed: yes  Patient Past Records were reviewed: yes  See orders below      ICD-10-CM ICD-9-CM    1. Encounter to establish care  H66.49 P03.2 METABOLIC PANEL, COMPREHENSIVE      CBC W/O DIFF      HEMOGLOBIN A1C WITH EAG      LIPID PANEL            Will review labs and make follow up based on it. 2. Oxygen dependent  Z99.81 V46.2 REFERRAL TO PULMONARY DISEASE      3. Need for hepatitis C screening test  Z11.59 V73.89 HEPATITIS C AB                Orders Placed This Encounter    METABOLIC PANEL, COMPREHENSIVE     Standing Status:   Future     Number of Occurrences:   1     Standing Expiration Date:   3/30/2024    CBC W/O DIFF     Standing Status:   Future     Number of Occurrences:   1     Standing Expiration Date:   3/30/2024    HEMOGLOBIN A1C WITH EAG     Standing Status:   Future     Number of Occurrences:   1     Standing Expiration Date:   3/30/2024    LIPID PANEL     Standing Status:   Future     Number of Occurrences:   1     Standing Expiration Date:   3/30/2024    HEPATITIS C AB     Standing Status:   Future     Number of Occurrences:   1     Standing Expiration Date:   3/30/2024    REFERRAL TO PULMONARY DISEASE     Referral Priority:   Routine     Referral Type:   Consultation     Referral Reason:   Specialty Services Required     Referred to Provider:   Emily Gomez MD     Requested Specialty:   Pulmonary Disease     Number of Visits Requested:   1    albuterol-ipratropium (DUO-NEB) 2.5 mg-0.5 mg/3 ml nebu     Sig: 3 mL four (4) times daily. spironolactone (ALDACTONE) 25 mg tablet     Sig: Take 50 mg by mouth daily.     dapagliflozin (Farxiga) 10 mg tab tablet Sig: Take 10 mg by mouth daily. torsemide (DEMADEX) 100 mg tablet     Sig: Take 100 mg by mouth two (2) times a day. Patient Instructions   Will call with lab results and discuss plan accordingly. Follow-up and Dispositions    Return in about 3 months (around 6/30/2023) for Will review lab results first..       I have reviewed with the patient details of the assessment and plan and all questions were answered. Relevent patient education was performed. The most recent lab findings were reviewed with the patient. An After Visit Summary was printed and given to the patient.     Signed By: Gurwinder Schmidt NP     March 30, 2023         -----------------------------------------------------------------------------------------------------------------------------------------

## 2023-03-30 NOTE — PROGRESS NOTES
Chief Complaint   Patient presents with    Establish Care     1. Have you been to the ER, urgent care clinic since your last visit? Hospitalized since your last visit? No    2. Have you seen or consulted any other health care providers outside of the [address] [place] [address] since your last visit? Include any pap smears or colon screening.  No

## 2023-03-31 LAB
ALBUMIN SERPL-MCNC: 3.2 G/DL (ref 3.5–5)
ALBUMIN/GLOB SERPL: 0.6 (ref 1.1–2.2)
ALP SERPL-CCNC: 54 U/L (ref 45–117)
ALT SERPL-CCNC: 12 U/L (ref 12–78)
ANION GAP SERPL CALC-SCNC: 1 MMOL/L (ref 5–15)
AST SERPL-CCNC: 12 U/L (ref 15–37)
BILIRUB SERPL-MCNC: 0.3 MG/DL (ref 0.2–1)
BUN SERPL-MCNC: 11 MG/DL (ref 6–20)
BUN/CREAT SERPL: 13 (ref 12–20)
CALCIUM SERPL-MCNC: 9.1 MG/DL (ref 8.5–10.1)
CHLORIDE SERPL-SCNC: 102 MMOL/L (ref 97–108)
CHOLEST SERPL-MCNC: 168 MG/DL
CO2 SERPL-SCNC: 32 MMOL/L (ref 21–32)
CREAT SERPL-MCNC: 0.82 MG/DL (ref 0.7–1.3)
ERYTHROCYTE [DISTWIDTH] IN BLOOD BY AUTOMATED COUNT: 17.6 % (ref 11.5–14.5)
EST. AVERAGE GLUCOSE BLD GHB EST-MCNC: 140 MG/DL
GLOBULIN SER CALC-MCNC: 5.3 G/DL (ref 2–4)
GLUCOSE SERPL-MCNC: 124 MG/DL (ref 65–100)
HBA1C MFR BLD: 6.5 % (ref 4–5.6)
HCT VFR BLD AUTO: 38.1 % (ref 36.6–50.3)
HCV AB SERPL QL IA: NONREACTIVE
HDLC SERPL-MCNC: 40 MG/DL
HDLC SERPL: 4.2 (ref 0–5)
HGB BLD-MCNC: 10.4 G/DL (ref 12.1–17)
LDLC SERPL CALC-MCNC: 110 MG/DL (ref 0–100)
MCH RBC QN AUTO: 22.7 PG (ref 26–34)
MCHC RBC AUTO-ENTMCNC: 27.3 G/DL (ref 30–36.5)
MCV RBC AUTO: 83.2 FL (ref 80–99)
NRBC # BLD: 0 K/UL (ref 0–0.01)
NRBC BLD-RTO: 0 PER 100 WBC
PLATELET # BLD AUTO: 345 K/UL (ref 150–400)
PMV BLD AUTO: 10.5 FL (ref 8.9–12.9)
POTASSIUM SERPL-SCNC: 4 MMOL/L (ref 3.5–5.1)
PROT SERPL-MCNC: 8.5 G/DL (ref 6.4–8.2)
RBC # BLD AUTO: 4.58 M/UL (ref 4.1–5.7)
SODIUM SERPL-SCNC: 135 MMOL/L (ref 136–145)
TRIGL SERPL-MCNC: 90 MG/DL (ref ?–150)
VLDLC SERPL CALC-MCNC: 18 MG/DL
WBC # BLD AUTO: 7.4 K/UL (ref 4.1–11.1)

## 2024-04-11 ENCOUNTER — APPOINTMENT (OUTPATIENT)
Facility: HOSPITAL | Age: 44
DRG: 194 | End: 2024-04-11
Payer: MEDICAID

## 2024-04-11 ENCOUNTER — HOSPITAL ENCOUNTER (INPATIENT)
Facility: HOSPITAL | Age: 44
LOS: 6 days | Discharge: HOME | DRG: 194 | End: 2024-04-17
Attending: EMERGENCY MEDICINE | Admitting: STUDENT IN AN ORGANIZED HEALTH CARE EDUCATION/TRAINING PROGRAM
Payer: MEDICAID

## 2024-04-11 DIAGNOSIS — E87.79 OTHER HYPERVOLEMIA: Primary | ICD-10-CM

## 2024-04-11 DIAGNOSIS — I50.84 CHF (NYHA CLASS IV, ACC/AHA STAGE D) (HCC): ICD-10-CM

## 2024-04-11 DIAGNOSIS — I50.23 ACUTE ON CHRONIC SYSTOLIC CONGESTIVE HEART FAILURE (HCC): ICD-10-CM

## 2024-04-11 LAB
ALBUMIN SERPL-MCNC: 2.5 G/DL (ref 3.5–5)
ALBUMIN/GLOB SERPL: 0.4 (ref 1.1–2.2)
ALP SERPL-CCNC: 56 U/L (ref 45–117)
ALT SERPL-CCNC: 10 U/L (ref 12–78)
ANION GAP SERPL CALC-SCNC: 3 MMOL/L (ref 5–15)
AST SERPL-CCNC: 28 U/L (ref 15–37)
BASOPHILS # BLD: 0 K/UL (ref 0–0.1)
BASOPHILS NFR BLD: 0 % (ref 0–1)
BILIRUB SERPL-MCNC: 0.6 MG/DL (ref 0.2–1)
BUN SERPL-MCNC: 15 MG/DL (ref 6–20)
BUN/CREAT SERPL: 11 (ref 12–20)
CALCIUM SERPL-MCNC: 8.7 MG/DL (ref 8.5–10.1)
CHLORIDE SERPL-SCNC: 97 MMOL/L (ref 97–108)
CO2 SERPL-SCNC: 36 MMOL/L (ref 21–32)
COMMENT:: NORMAL
CREAT SERPL-MCNC: 1.34 MG/DL (ref 0.7–1.3)
DIFFERENTIAL METHOD BLD: ABNORMAL
EKG ATRIAL RATE: 95 BPM
EKG DIAGNOSIS: NORMAL
EKG P AXIS: 66 DEGREES
EKG P-R INTERVAL: 206 MS
EKG Q-T INTERVAL: 400 MS
EKG QRS DURATION: 138 MS
EKG QTC CALCULATION (BAZETT): 502 MS
EKG R AXIS: 170 DEGREES
EKG T AXIS: 43 DEGREES
EKG VENTRICULAR RATE: 95 BPM
EOSINOPHIL # BLD: 0.2 K/UL (ref 0–0.4)
EOSINOPHIL NFR BLD: 2 % (ref 0–7)
ERYTHROCYTE [DISTWIDTH] IN BLOOD BY AUTOMATED COUNT: 19.5 % (ref 11.5–14.5)
GLOBULIN SER CALC-MCNC: 6.3 G/DL (ref 2–4)
GLUCOSE SERPL-MCNC: 124 MG/DL (ref 65–100)
HCT VFR BLD AUTO: 32.7 % (ref 36.6–50.3)
HGB BLD-MCNC: 8.7 G/DL (ref 12.1–17)
IMM GRANULOCYTES # BLD AUTO: 0 K/UL (ref 0–0.04)
IMM GRANULOCYTES NFR BLD AUTO: 0 % (ref 0–0.5)
LYMPHOCYTES # BLD: 1.3 K/UL (ref 0.8–3.5)
LYMPHOCYTES NFR BLD: 16 % (ref 12–49)
MCH RBC QN AUTO: 21.3 PG (ref 26–34)
MCHC RBC AUTO-ENTMCNC: 26.6 G/DL (ref 30–36.5)
MCV RBC AUTO: 80 FL (ref 80–99)
MONOCYTES # BLD: 0.8 K/UL (ref 0–1)
MONOCYTES NFR BLD: 9 % (ref 5–13)
NEUTS SEG # BLD: 6.1 K/UL (ref 1.8–8)
NEUTS SEG NFR BLD: 73 % (ref 32–75)
NRBC # BLD: 0 K/UL (ref 0–0.01)
NRBC BLD-RTO: 0 PER 100 WBC
NT PRO BNP: 4930 PG/ML
PLATELET # BLD AUTO: 376 K/UL (ref 150–400)
PMV BLD AUTO: 10 FL (ref 8.9–12.9)
POTASSIUM SERPL-SCNC: 3.7 MMOL/L (ref 3.5–5.1)
PROT SERPL-MCNC: 8.8 G/DL (ref 6.4–8.2)
RBC # BLD AUTO: 4.09 M/UL (ref 4.1–5.7)
RBC MORPH BLD: ABNORMAL
SODIUM SERPL-SCNC: 136 MMOL/L (ref 136–145)
SPECIMEN HOLD: NORMAL
TROPONIN I SERPL HS-MCNC: 77 NG/L (ref 0–76)
WBC # BLD AUTO: 8.4 K/UL (ref 4.1–11.1)
WBC MORPH BLD: ABNORMAL

## 2024-04-11 PROCEDURE — 93005 ELECTROCARDIOGRAM TRACING: CPT | Performed by: EMERGENCY MEDICINE

## 2024-04-11 PROCEDURE — 2060000000 HC ICU INTERMEDIATE R&B

## 2024-04-11 PROCEDURE — 96374 THER/PROPH/DIAG INJ IV PUSH: CPT

## 2024-04-11 PROCEDURE — 99285 EMERGENCY DEPT VISIT HI MDM: CPT

## 2024-04-11 PROCEDURE — 80053 COMPREHEN METABOLIC PANEL: CPT

## 2024-04-11 PROCEDURE — 71045 X-RAY EXAM CHEST 1 VIEW: CPT

## 2024-04-11 PROCEDURE — 6360000002 HC RX W HCPCS: Performed by: EMERGENCY MEDICINE

## 2024-04-11 PROCEDURE — 93010 ELECTROCARDIOGRAM REPORT: CPT | Performed by: INTERNAL MEDICINE

## 2024-04-11 PROCEDURE — 84484 ASSAY OF TROPONIN QUANT: CPT

## 2024-04-11 PROCEDURE — 36415 COLL VENOUS BLD VENIPUNCTURE: CPT

## 2024-04-11 PROCEDURE — 6370000000 HC RX 637 (ALT 250 FOR IP): Performed by: STUDENT IN AN ORGANIZED HEALTH CARE EDUCATION/TRAINING PROGRAM

## 2024-04-11 PROCEDURE — 85025 COMPLETE CBC W/AUTO DIFF WBC: CPT

## 2024-04-11 PROCEDURE — 2580000003 HC RX 258: Performed by: STUDENT IN AN ORGANIZED HEALTH CARE EDUCATION/TRAINING PROGRAM

## 2024-04-11 PROCEDURE — 83880 ASSAY OF NATRIURETIC PEPTIDE: CPT

## 2024-04-11 PROCEDURE — 6360000002 HC RX W HCPCS: Performed by: STUDENT IN AN ORGANIZED HEALTH CARE EDUCATION/TRAINING PROGRAM

## 2024-04-11 RX ORDER — POLYETHYLENE GLYCOL 3350 17 G/17G
17 POWDER, FOR SOLUTION ORAL DAILY PRN
Status: DISCONTINUED | OUTPATIENT
Start: 2024-04-11 | End: 2024-04-17 | Stop reason: HOSPADM

## 2024-04-11 RX ORDER — SODIUM CHLORIDE 0.9 % (FLUSH) 0.9 %
5-40 SYRINGE (ML) INJECTION EVERY 12 HOURS SCHEDULED
Status: DISCONTINUED | OUTPATIENT
Start: 2024-04-11 | End: 2024-04-17 | Stop reason: HOSPADM

## 2024-04-11 RX ORDER — CARVEDILOL 3.12 MG/1
3.12 TABLET ORAL 2 TIMES DAILY
Status: DISCONTINUED | OUTPATIENT
Start: 2024-04-11 | End: 2024-04-17 | Stop reason: HOSPADM

## 2024-04-11 RX ORDER — ONDANSETRON 4 MG/1
4 TABLET, ORALLY DISINTEGRATING ORAL EVERY 8 HOURS PRN
Status: DISCONTINUED | OUTPATIENT
Start: 2024-04-11 | End: 2024-04-17 | Stop reason: HOSPADM

## 2024-04-11 RX ORDER — ACETAMINOPHEN 325 MG/1
650 TABLET ORAL EVERY 6 HOURS PRN
Status: DISCONTINUED | OUTPATIENT
Start: 2024-04-11 | End: 2024-04-17 | Stop reason: HOSPADM

## 2024-04-11 RX ORDER — MAGNESIUM SULFATE IN WATER 40 MG/ML
2000 INJECTION, SOLUTION INTRAVENOUS PRN
Status: DISCONTINUED | OUTPATIENT
Start: 2024-04-11 | End: 2024-04-17 | Stop reason: HOSPADM

## 2024-04-11 RX ORDER — FUROSEMIDE 10 MG/ML
80 INJECTION INTRAMUSCULAR; INTRAVENOUS 2 TIMES DAILY
Status: DISCONTINUED | OUTPATIENT
Start: 2024-04-11 | End: 2024-04-16

## 2024-04-11 RX ORDER — ONDANSETRON 2 MG/ML
4 INJECTION INTRAMUSCULAR; INTRAVENOUS EVERY 6 HOURS PRN
Status: DISCONTINUED | OUTPATIENT
Start: 2024-04-11 | End: 2024-04-17 | Stop reason: HOSPADM

## 2024-04-11 RX ORDER — ENOXAPARIN SODIUM 100 MG/ML
30 INJECTION SUBCUTANEOUS 2 TIMES DAILY
Status: DISCONTINUED | OUTPATIENT
Start: 2024-04-11 | End: 2024-04-13

## 2024-04-11 RX ORDER — FUROSEMIDE 10 MG/ML
40 INJECTION INTRAMUSCULAR; INTRAVENOUS ONCE
Status: COMPLETED | OUTPATIENT
Start: 2024-04-11 | End: 2024-04-11

## 2024-04-11 RX ORDER — SODIUM CHLORIDE 0.9 % (FLUSH) 0.9 %
5-40 SYRINGE (ML) INJECTION PRN
Status: DISCONTINUED | OUTPATIENT
Start: 2024-04-11 | End: 2024-04-16

## 2024-04-11 RX ORDER — POTASSIUM CHLORIDE 7.45 MG/ML
10 INJECTION INTRAVENOUS PRN
Status: DISCONTINUED | OUTPATIENT
Start: 2024-04-11 | End: 2024-04-16

## 2024-04-11 RX ORDER — ACETAMINOPHEN 650 MG/1
650 SUPPOSITORY RECTAL EVERY 6 HOURS PRN
Status: DISCONTINUED | OUTPATIENT
Start: 2024-04-11 | End: 2024-04-17 | Stop reason: HOSPADM

## 2024-04-11 RX ORDER — POTASSIUM CHLORIDE 750 MG/1
40 TABLET, FILM COATED, EXTENDED RELEASE ORAL PRN
Status: DISCONTINUED | OUTPATIENT
Start: 2024-04-11 | End: 2024-04-16

## 2024-04-11 RX ORDER — SODIUM CHLORIDE 9 MG/ML
INJECTION, SOLUTION INTRAVENOUS PRN
Status: DISCONTINUED | OUTPATIENT
Start: 2024-04-11 | End: 2024-04-17 | Stop reason: HOSPADM

## 2024-04-11 RX ADMIN — CARVEDILOL 3.12 MG: 6.25 TABLET, FILM COATED ORAL at 09:40

## 2024-04-11 RX ADMIN — ENOXAPARIN SODIUM 30 MG: 100 INJECTION SUBCUTANEOUS at 22:22

## 2024-04-11 RX ADMIN — FUROSEMIDE 80 MG: 10 INJECTION, SOLUTION INTRAMUSCULAR; INTRAVENOUS at 16:26

## 2024-04-11 RX ADMIN — EMPAGLIFLOZIN 10 MG: 10 TABLET, FILM COATED ORAL at 11:36

## 2024-04-11 RX ADMIN — SODIUM CHLORIDE, PRESERVATIVE FREE 10 ML: 5 INJECTION INTRAVENOUS at 22:23

## 2024-04-11 RX ADMIN — FUROSEMIDE 40 MG: 10 INJECTION, SOLUTION INTRAMUSCULAR; INTRAVENOUS at 08:49

## 2024-04-11 RX ADMIN — FUROSEMIDE 40 MG: 10 INJECTION, SOLUTION INTRAMUSCULAR; INTRAVENOUS at 03:33

## 2024-04-11 RX ADMIN — SACUBITRIL AND VALSARTAN 1 TABLET: 49; 51 TABLET, FILM COATED ORAL at 09:45

## 2024-04-11 RX ADMIN — SACUBITRIL AND VALSARTAN 1 TABLET: 49; 51 TABLET, FILM COATED ORAL at 22:22

## 2024-04-11 RX ADMIN — SODIUM CHLORIDE, PRESERVATIVE FREE 10 ML: 5 INJECTION INTRAVENOUS at 08:49

## 2024-04-11 RX ADMIN — CARVEDILOL 3.12 MG: 6.25 TABLET, FILM COATED ORAL at 22:22

## 2024-04-11 ASSESSMENT — PAIN SCALES - GENERAL
PAINLEVEL_OUTOF10: 0

## 2024-04-11 NOTE — PROGRESS NOTES
ED TO INPATIENT SBAR HANDOFF    Patient Name: Kermit Paulino   Preferred Name: Kermit GUTIERRES  : 1980  44 y.o.   Family/Caregiver Present: no   Code Status Order: Full Code  PO Status: NPO:No  Telemetry Order:   C-SSRS: Risk of Suicide: No Risk  Sitter no   Restraints:     Sepsis Risk Score Sepsis Risk Score: 0.55    Situation  Chief Complaint   Patient presents with    Shortness of Breath    Leg Swelling     Brief Description of Patient's Condition: CHF exacerbation: SOB and increased BLE edema. Now on 2-5 L NC, 2.5 L NC at baseline. 80 mg Lasix BID ordered.    Mental Status: oriented, alert, coherent, logical, thought processes intact, and able to concentrate and follow conversation  Arrived from:Home  Imaging:   XR CHEST PORTABLE   Final Result      Stable cardiomegaly. No acute abnormality.           Abnormal labs:   Abnormal Labs Reviewed   CBC WITH AUTO DIFFERENTIAL - Abnormal; Notable for the following components:       Result Value    RBC 4.09 (*)     Hemoglobin 8.7 (*)     Hematocrit 32.7 (*)     MCH 21.3 (*)     MCHC 26.6 (*)     RDW 19.5 (*)     All other components within normal limits   COMPREHENSIVE METABOLIC PANEL - Abnormal; Notable for the following components:    CO2 36 (*)     Anion Gap 3 (*)     Glucose 124 (*)     Creatinine 1.34 (*)     Bun/Cre Ratio 11 (*)     ALT 10 (*)     Total Protein 8.8 (*)     Albumin 2.5 (*)     Globulin 6.3 (*)     Albumin/Globulin Ratio 0.4 (*)     All other components within normal limits   TROPONIN - Abnormal; Notable for the following components:    Troponin, High Sensitivity 77 (*)     All other components within normal limits   BRAIN NATRIURETIC PEPTIDE - Abnormal; Notable for the following components:    NT Pro-BNP 4,930 (*)     All other components within normal limits       Background  Allergies: No Known Allergies  History:   Past Medical History:   Diagnosis Date    Asthma     Gastrointestinal disorder     ulcers    Hypertension        Assessment  Vitals:   PT arrives via EMS from home with c/o back pain. Pt ambulated into ED with NAG. PT states hx of chronic back pain. Pt states back pain started yesterday and has got worse since then.     Shivam Ordonez, RN  04/13/21 [id number]

## 2024-04-11 NOTE — PLAN OF CARE
Problem: Safety - Adult  Goal: Free from fall injury  Outcome: Progressing  Flowsheets (Taken 4/11/2024 1651)  Free From Fall Injury: Instruct family/caregiver on patient safety     Problem: Discharge Planning  Goal: Discharge to home or other facility with appropriate resources  Outcome: Progressing  Flowsheets (Taken 4/11/2024 1627)  Discharge to home or other facility with appropriate resources:   Identify barriers to discharge with patient and caregiver   Arrange for needed discharge resources and transportation as appropriate     Problem: Skin/Tissue Integrity  Goal: Absence of new skin breakdown  Description: 1.  Monitor for areas of redness and/or skin breakdown  2.  Assess vascular access sites hourly  3.  Every 4-6 hours minimum:  Change oxygen saturation probe site  4.  Every 4-6 hours:  If on nasal continuous positive airway pressure, respiratory therapy assess nares and determine need for appliance change or resting period.  Outcome: Progressing     Problem: Chronic Conditions and Co-morbidities  Goal: Patient's chronic conditions and co-morbidity symptoms are monitored and maintained or improved  Outcome: Progressing     Problem: Neurosensory - Adult  Goal: Achieves stable or improved neurological status  Outcome: Progressing  Goal: Absence of seizures  Outcome: Progressing  Goal: Remains free of injury related to seizures activity  Outcome: Progressing  Goal: Achieves maximal functionality and self care  Outcome: Progressing     Problem: Cardiovascular - Adult  Goal: Maintains optimal cardiac output and hemodynamic stability  Outcome: Progressing  Goal: Absence of cardiac dysrhythmias or at baseline  Outcome: Progressing     Problem: Respiratory - Adult  Goal: Achieves optimal ventilation and oxygenation  Outcome: Progressing     Problem: Gastrointestinal - Adult  Goal: Minimal or absence of nausea and vomiting  Outcome: Progressing  Goal: Maintains or returns to baseline bowel function  Outcome:

## 2024-04-11 NOTE — H&P
History & Physical    Primary Care Provider: Annia Love APRN - NP  Source of Information: Patient and chart review    History of Presenting Illness:   Kermit Paulino is a 44 y.o. male with history of chronic systolic heart failure (EF 20%), nonischemic cardiomyopathy, chronic hypoxic respiratory failure on 2 L nasal cannula at baseline severe morbid obesity, hypertension, asthma, IV drug abuse, iron deficiency anemia who presented to the emergency department with complaints of shortness of breath and worsening lower extremity swelling.  The patient denies any fever, chills, chest or abdominal pain, nausea, vomiting, cough, congestion, recent illness, palpitations, or dysuria.    Remarkable vitals on ER Presentation: BP to 163/109  Labs Remarkable for: Hemoglobin 8.7, troponin 77, BNP 4930, cr1.34, alb 2.5  ER Images: Chest x-ray showed stable cardiomegaly and no acute abnormality  ER Rx: Lasix 40 mg IV     Review of Systems:  Pertinent items are noted in the History of Present Illness.     Past Medical History:   Diagnosis Date    Asthma     Gastrointestinal disorder     ulcers    Hypertension       Past Surgical History:   Procedure Laterality Date    COLONOSCOPY N/A 11/2/2022    COLONOSCOPY performed by Alfonso Beck MD at [facility] ENDOSCOPY    ORTHOPEDIC SURGERY      R hand repair     Prior to Admission medications    Medication Sig Start Date End Date Taking? Authorizing Provider   albuterol sulfate HFA (PROVENTIL;VENTOLIN;PROAIR) 108 (90 Base) MCG/ACT inhaler Inhale 2 puffs into the lungs every 6 hours as needed 12/31/16   Automatic Reconciliation, Ar   carvedilol (COREG) 3.125 MG tablet Take 3.125 mg by mouth 2 times daily 4/25/22   Automatic Reconciliation, Ar   dapagliflozin (FARXIGA) 5 MG tablet Take 5 mg by mouth daily 6/18/22   Automatic Reconciliation, Ar   fluticasone (FLONASE) 50 MCG/ACT nasal spray 2 sprays by Nasal route daily as needed    Automatic Reconciliation, Ar   sacubitril-valsartan

## 2024-04-11 NOTE — ED PROVIDER NOTES
[facility] EMERGENCY DEP  EMERGENCY DEPARTMENT ENCOUNTER      Pt Name: Kermit Paulino  MRN: [id number]  Birthdate 1980  Date of evaluation: 4/11/2024  Provider: Don Arana MD    CHIEF COMPLAINT       Chief Complaint   Patient presents with    Shortness of Breath    Leg Swelling         HISTORY OF PRESENT ILLNESS   (Location/Symptom, Timing/Onset, Context/Setting, Quality, Duration, Modifying Factors, Severity)  Note limiting factors.   44-year-old with a history of chronic systolic heart failure (EF 20%), NICM, HTN, asthma.  He presents with his complaints of increasing fluid retention.  He states that swelling in his lower extremities, abdomen, and \"groin\" have been worsening over the past several weeks.  He suspects he is gaining weight but has not been keeping track.  He has had increasing dyspnea over that time frame.  He reports compliance with his torsemide without success.  He has had intermittent chest pain.  His cardiologist is at [facility], and he states that he has missed his last several appointments there.  He has home O2.          Review of External Medical Records:     Nursing Notes were reviewed.    REVIEW OF SYSTEMS    (2-9 systems for level 4, 10 or more for level 5)     Review of Systems    Except as noted above the remainder of the review of systems was reviewed and negative.       PAST MEDICAL HISTORY     Past Medical History:   Diagnosis Date    Asthma     Gastrointestinal disorder     ulcers    Hypertension          SURGICAL HISTORY       Past Surgical History:   Procedure Laterality Date    COLONOSCOPY N/A 11/2/2022    COLONOSCOPY performed by Alfonso Beck MD at [facility] ENDOSCOPY    ORTHOPEDIC SURGERY      R hand repair         CURRENT MEDICATIONS       Previous Medications    ALBUTEROL SULFATE HFA (PROVENTIL;VENTOLIN;PROAIR) 108 (90 BASE) MCG/ACT INHALER    Inhale 2 puffs into the lungs every 6 hours as needed    CARVEDILOL (COREG) 3.125 MG TABLET    Take 3.125 mg by mouth 2 times daily

## 2024-04-11 NOTE — ED TRIAGE NOTES
Triage: Pt arrives from home with CC of shortness of breath, JUNIOR, and BLE edema. Pt has hx of CHF. See's [facility] cardiology. Reports he has missed some recent doctor's appointments. He endorses compliance with his torsemide. He is on nasal cannula at baseline. He has been using nebulizers at home for his SOB.

## 2024-04-11 NOTE — PROGRESS NOTES
[facility]                                                                                          Hospitalist Progress Note  Alfonso Rowan MD  Office Phone: [phone]        Date of Service:  2024  NAME:  Kermit Paulino  :  1980  MRN:  [id number]       Admission Summary:   Kermit Paulino is a 44 y.o. male with history of chronic systolic heart failure (EF 20%), nonischemic cardiomyopathy, chronic hypoxic respiratory failure on 2 L nasal cannula at baseline severe morbid obesity, hypertension, asthma, IV drug abuse, iron deficiency anemia who presented to the emergency department with complaints of shortness of breath and worsening lower extremity swelling.  The patient denies any fever, chills, chest or abdominal pain, nausea, vomiting, cough, congestion, recent illness, palpitations, or dysuria.     Remarkable vitals on ER Presentation: BP to 163/109  Labs Remarkable for: Hemoglobin 8.7, troponin 77, BNP 4930, cr1.34, alb 2.5  ER Images: Chest x-ray showed stable cardiomegaly and no acute abnormality  ER Rx: Lasix 40 mg IV       Interval history / Subjective:   Follow up CHF  Feels better   On 2l NC  Drowsy but arousable   Says dry weight 220 to 240 lbs  Assessment & Plan:     Acute on chronic systolic congestive heart failure NYHA III  Chronic hypoxic respiratory failure secondary to CHF  Nonischemic cardiomyopathy  Baseline 2l NC  -C 5/10/23 with EF 20%  -Continue diuresis with lasix 80mg iv bid  -Strict I&Os with daily weights  -Continue home supplemental oxygen  -ECHO in AM  -PTA Coreg, Entresto  -Appreciate Cardiology     Hypertension  -Continue Entresto, Coreg, Lasix     Asthma  -DuoNebs as needed     Iron deficiency anemia  -Patient currently not on iron supplementation  -Advised compliance with home iron therapy     Severe morbid obesity: Counseled on weight loss, dieting and exercise  Probable KELSEY: scheduled for outpatient sleep study

## 2024-04-11 NOTE — PROGRESS NOTES
0750: Bedside and Verbal shift change report given to Cynthia Baron RN (oncoming nurse) by night shift RN (offgoing nurse). Report included the following information Nurse Handoff Report, Index, ED Encounter Summary, ED SBAR, Adult Overview, Intake/Output, MAR, Recent Results, Cardiac Rhythm NSR, Alarm Parameters, Quality Measures, and Neuro Assessment.     0800: Assessment completed. Pt repositioned.     1528: ED transfer report completed.     1615: Pt transported in stable condition to 2N room 216 with all pt belongings. Pt in no acute distress. Pt has no further questions.

## 2024-04-11 NOTE — NURSE NAVIGATOR
HEART FAILURE NURSE NAVIGATOR NOTE  [facility]    Patient chart was reviewed by Heart Failure (HF) Nurse Navigators for compliance of prescribed treatment with guidelines directed medical therapy (GDMT) and HF database completed.     Please, review beneath recommendations for symptomatic patients with HF with Reduced Ejection Fraction ? 40% (HFrEF)* and patients whose LVEF improved > 40% (HFimpEF)* for your consideration when taking care of this patient.    Current Medical Therapy:    Name Kermit Paulino    1980   LVEF Pending; previous EF 20-25% (echo dated )   NYHA Functional Class Class III on admission   ARNi/ACEi/ARB Entresto 49/51 mg bid   Beta-blocker Coreg 3.125mg bid   Aldosterone Antagonist Consider adding if renal function stable   SGLT2 inhibitor Jardiance 10mg qd   Hydralazine/Isosorbide Dinitrate Not prescribed; see below recommendations   Consulting Cardiologist: Dr Sorto ([facility])     Recommendations:    Please, add the following GDMT for HFrEF ? 40% [Class 1] or document in discharge summary/progress note why patient cannot take the medication:  ARNi/ACEi or ARB  Beta-blockers (carvedilol, sustained-release metoprolol succinate or bisoprolol)  Aldosterone antagonists GFR > 30 and K< 5  SGLT2 inhibitor  Hydralazine/Isosorbide dinitrate for  Americans with Class III/IV symptoms  Adjust diuretic dose at discharge if hospitalized for volume overload    Consider adding the following GDMT for HFrEF ? 40%, if appropriate [Class 2b]:  Ivabradine for patients on maximally tolerated beta-blocker dose in order to achieve HR 70-80bpm  Digoxin, goal level 0.5-0.9  Polyunsaturated fatty acids  Vericuguat  For patient with hyperkalemia while on RAASi > 5.5, consider adding potassium binders (patiromer, sodium zirconium cycosilicate)    Note: the following medications may be potentially harmful in heart failure [Class 3]:  Calcium channel blockers (doxazosin,

## 2024-04-11 NOTE — PROGRESS NOTES
Lovenox Monitoring  Indication: DVT Prophylaxis  Recent Labs     04/11/24  0319   HGB 8.7*        Current Weight: 149.7 kg  Est. CrCl = 100 ml/min  Current Dose: 40 mg subcutaneously every 24 hours.  Plan: Change to 30 mg subcutaneously every 12 hours    (CLOSE I-vent after review and verification)

## 2024-04-12 ENCOUNTER — APPOINTMENT (OUTPATIENT)
Facility: HOSPITAL | Age: 44
DRG: 194 | End: 2024-04-12
Attending: STUDENT IN AN ORGANIZED HEALTH CARE EDUCATION/TRAINING PROGRAM
Payer: MEDICAID

## 2024-04-12 LAB
ANION GAP SERPL CALC-SCNC: 3 MMOL/L (ref 5–15)
BASOPHILS # BLD: 0.1 K/UL (ref 0–0.1)
BASOPHILS NFR BLD: 1 % (ref 0–1)
BUN SERPL-MCNC: 16 MG/DL (ref 6–20)
BUN/CREAT SERPL: 14 (ref 12–20)
CALCIUM SERPL-MCNC: 8.6 MG/DL (ref 8.5–10.1)
CHLORIDE SERPL-SCNC: 101 MMOL/L (ref 97–108)
CO2 SERPL-SCNC: 34 MMOL/L (ref 21–32)
CREAT SERPL-MCNC: 1.18 MG/DL (ref 0.7–1.3)
DIFFERENTIAL METHOD BLD: ABNORMAL
ECHO AO ROOT DIAM: 3.9 CM
ECHO AO ROOT INDEX: 1.47 CM/M2
ECHO AV AREA PEAK VELOCITY: 2.3 CM2
ECHO AV AREA/BSA PEAK VELOCITY: 0.9 CM2/M2
ECHO AV PEAK GRADIENT: 8 MMHG
ECHO AV PEAK VELOCITY: 1.5 M/S
ECHO AV VELOCITY RATIO: 0.53
ECHO BSA: 2.81 M2
ECHO EST RA PRESSURE: 15 MMHG
ECHO LA DIAMETER INDEX: 2.04 CM/M2
ECHO LA DIAMETER: 5.4 CM
ECHO LA TO AORTIC ROOT RATIO: 1.38
ECHO LV FRACTIONAL SHORTENING: 10 % (ref 28–44)
ECHO LV INTERNAL DIMENSION DIASTOLE INDEX: 2.94 CM/M2
ECHO LV INTERNAL DIMENSION DIASTOLIC: 7.8 CM (ref 4.2–5.9)
ECHO LV INTERNAL DIMENSION SYSTOLIC INDEX: 2.64 CM/M2
ECHO LV INTERNAL DIMENSION SYSTOLIC: 7 CM
ECHO LV IVSD: 1.1 CM (ref 0.6–1)
ECHO LV MASS 2D: 463 G (ref 88–224)
ECHO LV MASS INDEX 2D: 174.7 G/M2 (ref 49–115)
ECHO LV POSTERIOR WALL DIASTOLIC: 1.2 CM (ref 0.6–1)
ECHO LV RELATIVE WALL THICKNESS RATIO: 0.31
ECHO LVOT AREA: 4.2 CM2
ECHO LVOT DIAM: 2.3 CM
ECHO LVOT PEAK GRADIENT: 3 MMHG
ECHO LVOT PEAK VELOCITY: 0.8 M/S
ECHO MV A VELOCITY: 0.48 M/S
ECHO MV AREA PHT: 4.9 CM2
ECHO MV E DECELERATION TIME (DT): 153.5 MS
ECHO MV E VELOCITY: 0.62 M/S
ECHO MV E/A RATIO: 1.29
ECHO MV PRESSURE HALF TIME (PHT): 44.5 MS
ECHO PV MAX VELOCITY: 0.6 M/S
ECHO PV PEAK GRADIENT: 1 MMHG
ECHO RIGHT VENTRICULAR SYSTOLIC PRESSURE (RVSP): 49 MMHG
ECHO RV FREE WALL PEAK S': 7 CM/S
ECHO RV TAPSE: 0.9 CM (ref 1.7–?)
ECHO TV REGURGITANT MAX VELOCITY: 2.92 M/S
ECHO TV REGURGITANT PEAK GRADIENT: 34 MMHG
EOSINOPHIL # BLD: 0.2 K/UL (ref 0–0.4)
EOSINOPHIL NFR BLD: 2 % (ref 0–7)
ERYTHROCYTE [DISTWIDTH] IN BLOOD BY AUTOMATED COUNT: 19.4 % (ref 11.5–14.5)
GLUCOSE SERPL-MCNC: 142 MG/DL (ref 65–100)
HCT VFR BLD AUTO: 33.9 % (ref 36.6–50.3)
HGB BLD-MCNC: 8.8 G/DL (ref 12.1–17)
IMM GRANULOCYTES # BLD AUTO: 0 K/UL
IMM GRANULOCYTES NFR BLD AUTO: 0 %
LYMPHOCYTES # BLD: 1.7 K/UL (ref 0.8–3.5)
LYMPHOCYTES NFR BLD: 19 % (ref 12–49)
MAGNESIUM SERPL-MCNC: 2.3 MG/DL (ref 1.6–2.4)
MCH RBC QN AUTO: 20.8 PG (ref 26–34)
MCHC RBC AUTO-ENTMCNC: 26 G/DL (ref 30–36.5)
MCV RBC AUTO: 80.1 FL (ref 80–99)
MONOCYTES # BLD: 1.1 K/UL (ref 0–1)
MONOCYTES NFR BLD: 12 % (ref 5–13)
NEUTS BAND NFR BLD MANUAL: 1 % (ref 0–6)
NEUTS SEG # BLD: 6.1 K/UL (ref 1.8–8)
NEUTS SEG NFR BLD: 65 % (ref 32–75)
NRBC # BLD: 0 K/UL (ref 0–0.01)
NRBC BLD-RTO: 0 PER 100 WBC
NT PRO BNP: 2967 PG/ML
PLATELET # BLD AUTO: 372 K/UL (ref 150–400)
PMV BLD AUTO: 9.9 FL (ref 8.9–12.9)
POTASSIUM SERPL-SCNC: 3.8 MMOL/L (ref 3.5–5.1)
RBC # BLD AUTO: 4.23 M/UL (ref 4.1–5.7)
RBC MORPH BLD: ABNORMAL
SODIUM SERPL-SCNC: 138 MMOL/L (ref 136–145)
WBC # BLD AUTO: 9.2 K/UL (ref 4.1–11.1)

## 2024-04-12 PROCEDURE — 6370000000 HC RX 637 (ALT 250 FOR IP): Performed by: NURSE PRACTITIONER

## 2024-04-12 PROCEDURE — 2060000000 HC ICU INTERMEDIATE R&B

## 2024-04-12 PROCEDURE — 6370000000 HC RX 637 (ALT 250 FOR IP): Performed by: STUDENT IN AN ORGANIZED HEALTH CARE EDUCATION/TRAINING PROGRAM

## 2024-04-12 PROCEDURE — 6360000002 HC RX W HCPCS: Performed by: STUDENT IN AN ORGANIZED HEALTH CARE EDUCATION/TRAINING PROGRAM

## 2024-04-12 PROCEDURE — 80048 BASIC METABOLIC PNL TOTAL CA: CPT

## 2024-04-12 PROCEDURE — 83880 ASSAY OF NATRIURETIC PEPTIDE: CPT

## 2024-04-12 PROCEDURE — 2580000003 HC RX 258: Performed by: STUDENT IN AN ORGANIZED HEALTH CARE EDUCATION/TRAINING PROGRAM

## 2024-04-12 PROCEDURE — 85025 COMPLETE CBC W/AUTO DIFF WBC: CPT

## 2024-04-12 PROCEDURE — 83735 ASSAY OF MAGNESIUM: CPT

## 2024-04-12 PROCEDURE — 6360000004 HC RX CONTRAST MEDICATION: Performed by: HOSPITALIST

## 2024-04-12 PROCEDURE — 36415 COLL VENOUS BLD VENIPUNCTURE: CPT

## 2024-04-12 PROCEDURE — C8929 TTE W OR WO FOL WCON,DOPPLER: HCPCS

## 2024-04-12 RX ORDER — SPIRONOLACTONE 25 MG/1
12.5 TABLET ORAL DAILY
Status: DISCONTINUED | OUTPATIENT
Start: 2024-04-12 | End: 2024-04-15

## 2024-04-12 RX ORDER — IPRATROPIUM BROMIDE AND ALBUTEROL SULFATE 2.5; .5 MG/3ML; MG/3ML
1 SOLUTION RESPIRATORY (INHALATION) EVERY 4 HOURS PRN
Status: DISCONTINUED | OUTPATIENT
Start: 2024-04-12 | End: 2024-04-17 | Stop reason: HOSPADM

## 2024-04-12 RX ORDER — CALCIUM CARBONATE 500 MG/1
500 TABLET, CHEWABLE ORAL 3 TIMES DAILY PRN
Status: DISCONTINUED | OUTPATIENT
Start: 2024-04-12 | End: 2024-04-17 | Stop reason: HOSPADM

## 2024-04-12 RX ADMIN — SODIUM CHLORIDE, PRESERVATIVE FREE 10 ML: 5 INJECTION INTRAVENOUS at 20:55

## 2024-04-12 RX ADMIN — EMPAGLIFLOZIN 10 MG: 10 TABLET, FILM COATED ORAL at 08:49

## 2024-04-12 RX ADMIN — CALCIUM CARBONATE (ANTACID) CHEW TAB 500 MG 500 MG: 500 CHEW TAB at 05:10

## 2024-04-12 RX ADMIN — SODIUM CHLORIDE, PRESERVATIVE FREE 10 ML: 5 INJECTION INTRAVENOUS at 08:50

## 2024-04-12 RX ADMIN — SACUBITRIL AND VALSARTAN 1 TABLET: 49; 51 TABLET, FILM COATED ORAL at 08:49

## 2024-04-12 RX ADMIN — IPRATROPIUM BROMIDE AND ALBUTEROL SULFATE 1 DOSE: .5; 3 SOLUTION RESPIRATORY (INHALATION) at 01:23

## 2024-04-12 RX ADMIN — SACUBITRIL AND VALSARTAN 1 TABLET: 49; 51 TABLET, FILM COATED ORAL at 20:53

## 2024-04-12 RX ADMIN — PERFLUTREN 1.5 ML: 6.52 INJECTION, SUSPENSION INTRAVENOUS at 08:30

## 2024-04-12 RX ADMIN — CARVEDILOL 3.12 MG: 6.25 TABLET, FILM COATED ORAL at 08:49

## 2024-04-12 RX ADMIN — ENOXAPARIN SODIUM 30 MG: 100 INJECTION SUBCUTANEOUS at 20:55

## 2024-04-12 RX ADMIN — ENOXAPARIN SODIUM 30 MG: 100 INJECTION SUBCUTANEOUS at 08:50

## 2024-04-12 RX ADMIN — CARVEDILOL 3.12 MG: 6.25 TABLET, FILM COATED ORAL at 20:53

## 2024-04-12 RX ADMIN — FUROSEMIDE 80 MG: 10 INJECTION, SOLUTION INTRAMUSCULAR; INTRAVENOUS at 16:42

## 2024-04-12 RX ADMIN — SPIRONOLACTONE 12.5 MG: 25 TABLET ORAL at 08:50

## 2024-04-12 RX ADMIN — FUROSEMIDE 80 MG: 10 INJECTION, SOLUTION INTRAMUSCULAR; INTRAVENOUS at 08:50

## 2024-04-12 ASSESSMENT — PAIN SCALES - GENERAL: PAINLEVEL_OUTOF10: 0

## 2024-04-12 NOTE — NURSE NAVIGATOR
Heart Failure Nurse Navigator rounds completed.    HF NN provided introduction to self and nurse navigator role. Living With Heart Failure booklet given to patient, along with weight calendar, signs/symptoms magnet, and card with QR codes for HF video resources.       Patient states he has been living with heart failure for about 5 years.  He states he has not been following up with cardiology but he needs to.  He had a bathroom scale but when he moved it was misplaced.  Provided scale for patient.  Patient states he wants to follow up with [facility].  Reviewed the importance of taking his heart failure medication and having regular follow ups with cardiology.  Reviewed daily weights, daily weight calendar and heart failure zones.  Reviewed signs and symptoms of heart failure and when to call the cardiologist.  Reviewed low salt diet.  Clarified that salt in the diet leads to water retention.      Patient has a new phone number .   Updated in Epic.     Advised patient that follow up appointment will be scheduled and placed on Discharge Instructions prior to discharge. Patient given [facility] flyer. Will continue to follow until discharge.        Time spent with patient discussing HF education:  20 minutes     used

## 2024-04-12 NOTE — PROGRESS NOTES
[facility]                                                                                          Hospitalist Progress Note  Alfonso Rowan MD  Office Phone: [phone]        Date of Service:  2024  NAME:  Kermit Paulino  :  1980  MRN:  [id number]       Admission Summary:   Kermit Paulino is a 44 y.o. male with history of chronic systolic heart failure (EF 20%), nonischemic cardiomyopathy, chronic hypoxic respiratory failure on 2 L nasal cannula at baseline severe morbid obesity, hypertension, asthma, IV drug abuse, iron deficiency anemia who presented to the emergency department with complaints of shortness of breath and worsening lower extremity swelling.  The patient denies any fever, chills, chest or abdominal pain, nausea, vomiting, cough, congestion, recent illness, palpitations, or dysuria.     Remarkable vitals on ER Presentation: BP to 163/109  Labs Remarkable for: Hemoglobin 8.7, troponin 77, BNP 4930, cr1.34, alb 2.5  ER Images: Chest x-ray showed stable cardiomegaly and no acute abnormality  ER Rx: Lasix 40 mg IV       Interval history / Subjective:   Follow up CHF  Feels better   Now on room air (yesterday on 2l NC)  Drowsy but arousable   Says dry weight 220 to 240 lbs  Assessment & Plan:     Acute on chronic systolic congestive heart failure NYHA III  Chronic hypoxic respiratory failure secondary to CHF  Nonischemic cardiomyopathy  Baseline 2l NC  -[facility] 5/10/23 with EF 20%  -Continue diuresis with lasix 80mg iv bid  -Strict I&Os with daily weights  -Continue home supplemental oxygen  -ECHO done awaiting results  -PTA Coreg, Entresto, Farxiga, ?aldactone  -Appreciate Cardiology  -[facility]     Hypertension  -Continue Entresto, Coreg, Lasix     Asthma  -DuoNebs as needed     Iron deficiency anemia  -Patient currently not on iron supplementation  -Advised compliance with home iron therapy     Severe  morbid obesity: Counseled on weight loss, dieting and exercise  Probable KELSEY: scheduled for outpatient sleep study     Cardiac diet     Code status: FULL CODE  Prophylaxis: Lovenox    Plan: Continue IV diuresis  Care Plan discussed with: patient, RN  Anticipated Disposition: 2-3 days at home  Inpatient  Cardiac monitoring: Telemetry  Central Line:            Social Determinants of Health     Tobacco Use: Medium Risk (3/30/2023)    Patient History     Smoking Tobacco Use: Former     Smokeless Tobacco Use: Unknown     Passive Exposure: Not on file   Alcohol Use: Not on file   Financial Resource Strain: Not on file   Food Insecurity: Not on file   Transportation Needs: Not on file   Physical Activity: Not on file   Stress: Not on file   Social Connections: Not on file   Intimate Partner Violence: Not on file   Depression: Not at risk (3/30/2023)    PHQ-2     PHQ-2 Score: 0   Housing Stability: Not on file   Interpersonal Safety: Not At Risk (4/11/2024)    Interpersonal Safety Domain Source: IP Abuse Screening     Physical abuse: Denies     Verbal abuse: Denies     Emotional abuse: Denies     Financial abuse: Denies     Sexual abuse: Denies   Utilities: Not on file       Review of Systems:   Pertinent items are noted in HPI.       Vital Signs:    Last 24hrs VS reviewed since prior progress note. Most recent are:  Vitals:    04/12/24 1000   BP:    Pulse: 89   Resp:    Temp:    SpO2:          Intake/Output Summary (Last 24 hours) at 4/12/2024 1016  Last data filed at 4/11/2024 2105  Gross per 24 hour   Intake 360 ml   Output 2500 ml   Net -2140 ml          Physical Examination:     I had a face to face encounter with this patient and independently examined them on 4/12/2024 as outlined below:          General : alert x 3, awake, no acute distress,   HEENT: PEERL, EOMI, moist mucus membrane, TM clear  Neck: supple, no JVD, no meningeal signs  Chest: Clear to auscultation bilaterally   CVS: S1 S2 heard, Capillary refill

## 2024-04-12 NOTE — PROGRESS NOTES
Hospital follow-up PCP transitional care appointment has been scheduled with Dr. Annia Love for Friday, April 19th, 2024 at 1:00 p.m..  Pending patient discharge.  Chelita Alfonso, Care Management Assistant
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
## 2024-04-12 NOTE — CARE COORDINATION
Care Management Initial Assessment       RUR: 12% Low  Readmission? No  1st IM letter given? NA  1st  letter given: NA    Patient presented to the ED on 4/11/24 with shortness of breath and worsening lower extremity edema. He has history of chronic systolic heart failure (EF 20%), nonischemic cardiomyopathy, chronic hypoxia respiratory failure, obesity, hypertension, asthma, IV drug use, and iron deficiency. Patient lives with his mother. He reports independence at baseline and that he will have a ride home at [place]. There are no anticipated CM needs assessed at this time.       04/12/24 1519   Service Assessment   Patient Orientation Alert and Oriented;Person;Place;Situation;Self   Cognition Alert   History Provided By Patient   Primary Caregiver Self   Accompanied By/Relationship Daughter Edgar at the bedside   Support Systems Family Members   PCP Verified by CM No  (Patient reports that he has a new PCP.)   Last Visit to PCP Within last year   Prior Functional Level Independent in ADLs/IADLs   Current Functional Level Independent in ADLs/IADLs   Can patient return to prior living arrangement Yes   Ability to make needs known: Good   Financial Resources Medicaid   Community Resources None   Social/Functional History   Lives With Parent  (Mother Trish Poole)   Type of Home House   Bathroom Equipment None   Home Equipment Cane;Rollator;Oxygen  (Baseline oxygen 2L with [employer]; also has defibrillator)   Receives Help From Family   ADL Assistance Independent   Homemaking Assistance Independent   Ambulation Assistance Independent   Transfer Assistance Independent   Discharge Planning   Type of Residence House   Living Arrangements Parent   Current Services Prior To Admission Oxygen Therapy   Potential Assistance Needed N/A   DME Ordered? No   Potential Assistance Purchasing Medications No  ([address])   Type of Home Care Services None   Patient expects to be discharged to: House   Services At/After  Discharge   Transition of Care Consult (CM Consult) N/A   Services At/After Discharge None   Mode of Transport at Discharge Other (see comment)  (Family)   Confirm Follow Up Transport Self   Condition of Participation: Discharge Planning   The Plan for Transition of Care is related to the following treatment goals: Home       Ailin Ohara, MS  [phone]

## 2024-04-12 NOTE — NURSE NAVIGATOR
Follow up scheduled with Jennifer Aguilar [facility] for 4/22/24 at 1:00 pm.  Information on After Visit Summary.

## 2024-04-12 NOTE — PROGRESS NOTES
Cardiology Progress Note  2024    Admit Date: 2024  Admit Diagnosis: CHF (NYHA class IV, ACC/AHA stage D) ([facility]) [I50.84]  Acute on chronic systolic congestive heart failure ([facility]) [I50.23]  Other hypervolemia [E87.79]  CC:  HF    Assessment/Recommendations:  Acute on chronic HFrEF - NYHA III. H/o NICM with severely reduced LVEF 20%.  - followed at [facility] for HF but has not been consistent with follow up  - continue IV furosemide 80 mg BID  - continue coreg and entresto  - will replace home farxiga with jardiance while IP  - start spironolactone 12.5 mg daily  - echo pending  - has been referred for ICD before at [facility]. Has a NSIVCD with  ms so he may benefit from CRT. Encouraged to follow up at [facility] re this post discharge  - strict I/O and daily weights     Acute on chronic hypoxic respiratory failure  - monitor with diuresis. Likely has KELSEY     HTN  - titrate entresto as tolerated     Morbid obesity         Thank you for this consult. We will continue to follow along. Please contact us for any further questions or concerns.    Hospital problem list     Principal Problem:    CHF (NYHA class IV, ACC/AHA stage D) ([facility])  Resolved Problems:    * No resolved hospital problems. *                                                        Subjective:     NO chest pain. Still noting some dyspnea but better.     ROS: All other systems reviewed and were negative other than mentioned above.                                            No change in family and social history from H&P/Consult note.    Objective:    Physical Exam:  24 hr VS reviewed, overall VSSAF  Temp (24hrs), Av.6 °F (37 °C), Min:98.2 °F (36.8 °C), Max:99.1 °F (37.3 °C)    Patient Vitals for the past 8 hrs:   Pulse   24 0600 89   24 0422 79   24 0400 80   24 0200 90   24 0012 100    No data found. Patient Vitals for the past 8 hrs:   BP   24 0545 101/80   24 0422 91/69   24 0012  times per day    sodium chloride flush 0.9 % injection 5-40 mL  5-40 mL IntraVENous PRN    0.9 % sodium chloride infusion   IntraVENous PRN    potassium chloride (KLOR-CON) extended release tablet 40 mEq  40 mEq Oral PRN    Or    potassium bicarb-citric acid (EFFER-K) effervescent tablet 40 mEq  40 mEq Oral PRN    Or    potassium chloride 10 mEq/100 mL IVPB (Peripheral Line)  10 mEq IntraVENous PRN    magnesium sulfate 2000 mg in 50 mL IVPB premix  2,000 mg IntraVENous PRN    enoxaparin Sodium (LOVENOX) injection 30 mg  30 mg SubCUTAneous BID    ondansetron (ZOFRAN-ODT) disintegrating tablet 4 mg  4 mg Oral Q8H PRN    Or    ondansetron (ZOFRAN) injection 4 mg  4 mg IntraVENous Q6H PRN    polyethylene glycol (GLYCOLAX) packet 17 g  17 g Oral Daily PRN    acetaminophen (TYLENOL) tablet 650 mg  650 mg Oral Q6H PRN    Or    acetaminophen (TYLENOL) suppository 650 mg  650 mg Rectal Q6H PRN    furosemide (LASIX) injection 80 mg  80 mg IntraVENous BID    carvedilol (COREG) tablet 3.125 mg  3.125 mg Oral BID    sacubitril-valsartan (ENTRESTO) 49-51 MG per tablet 1 tablet  1 tablet Oral BID    empagliflozin (JARDIANCE) tablet 10 mg  10 mg Oral Daily         Aaron Sorto MD  General Cardiology  [facility]  04/12/24

## 2024-04-13 LAB
ANION GAP SERPL CALC-SCNC: 3 MMOL/L (ref 5–15)
BUN SERPL-MCNC: 17 MG/DL (ref 6–20)
BUN/CREAT SERPL: 13 (ref 12–20)
CALCIUM SERPL-MCNC: 8.6 MG/DL (ref 8.5–10.1)
CHLORIDE SERPL-SCNC: 99 MMOL/L (ref 97–108)
CO2 SERPL-SCNC: 36 MMOL/L (ref 21–32)
CREAT SERPL-MCNC: 1.35 MG/DL (ref 0.7–1.3)
FOLATE SERPL-MCNC: 10.2 NG/ML (ref 5–21)
GLUCOSE SERPL-MCNC: 130 MG/DL (ref 65–100)
IRON SATN MFR SERPL: 5 % (ref 20–50)
IRON SERPL-MCNC: 24 UG/DL (ref 35–150)
NT PRO BNP: 2049 PG/ML
POTASSIUM SERPL-SCNC: 3.8 MMOL/L (ref 3.5–5.1)
SODIUM SERPL-SCNC: 138 MMOL/L (ref 136–145)
TIBC SERPL-MCNC: 439 UG/DL (ref 250–450)
VIT B12 SERPL-MCNC: 567 PG/ML (ref 193–986)

## 2024-04-13 PROCEDURE — 6370000000 HC RX 637 (ALT 250 FOR IP): Performed by: STUDENT IN AN ORGANIZED HEALTH CARE EDUCATION/TRAINING PROGRAM

## 2024-04-13 PROCEDURE — 2060000000 HC ICU INTERMEDIATE R&B

## 2024-04-13 PROCEDURE — 83880 ASSAY OF NATRIURETIC PEPTIDE: CPT

## 2024-04-13 PROCEDURE — 83540 ASSAY OF IRON: CPT

## 2024-04-13 PROCEDURE — 2580000003 HC RX 258: Performed by: STUDENT IN AN ORGANIZED HEALTH CARE EDUCATION/TRAINING PROGRAM

## 2024-04-13 PROCEDURE — 82746 ASSAY OF FOLIC ACID SERUM: CPT

## 2024-04-13 PROCEDURE — 83550 IRON BINDING TEST: CPT

## 2024-04-13 PROCEDURE — 82607 VITAMIN B-12: CPT

## 2024-04-13 PROCEDURE — 80048 BASIC METABOLIC PNL TOTAL CA: CPT

## 2024-04-13 PROCEDURE — 6360000002 HC RX W HCPCS: Performed by: STUDENT IN AN ORGANIZED HEALTH CARE EDUCATION/TRAINING PROGRAM

## 2024-04-13 PROCEDURE — 36415 COLL VENOUS BLD VENIPUNCTURE: CPT

## 2024-04-13 RX ORDER — ENOXAPARIN SODIUM 100 MG/ML
40 INJECTION SUBCUTANEOUS 2 TIMES DAILY
Status: DISCONTINUED | OUTPATIENT
Start: 2024-04-13 | End: 2024-04-17 | Stop reason: HOSPADM

## 2024-04-13 RX ADMIN — SODIUM CHLORIDE, PRESERVATIVE FREE 10 ML: 5 INJECTION INTRAVENOUS at 09:24

## 2024-04-13 RX ADMIN — CARVEDILOL 3.12 MG: 6.25 TABLET, FILM COATED ORAL at 21:09

## 2024-04-13 RX ADMIN — SPIRONOLACTONE 12.5 MG: 25 TABLET ORAL at 09:24

## 2024-04-13 RX ADMIN — SACUBITRIL AND VALSARTAN 1 TABLET: 49; 51 TABLET, FILM COATED ORAL at 09:24

## 2024-04-13 RX ADMIN — FUROSEMIDE 80 MG: 10 INJECTION, SOLUTION INTRAMUSCULAR; INTRAVENOUS at 09:24

## 2024-04-13 RX ADMIN — SACUBITRIL AND VALSARTAN 1 TABLET: 49; 51 TABLET, FILM COATED ORAL at 21:09

## 2024-04-13 RX ADMIN — EMPAGLIFLOZIN 10 MG: 10 TABLET, FILM COATED ORAL at 09:24

## 2024-04-13 RX ADMIN — SODIUM CHLORIDE, PRESERVATIVE FREE 10 ML: 5 INJECTION INTRAVENOUS at 21:09

## 2024-04-13 RX ADMIN — CARVEDILOL 3.12 MG: 6.25 TABLET, FILM COATED ORAL at 09:24

## 2024-04-13 RX ADMIN — ENOXAPARIN SODIUM 40 MG: 100 INJECTION SUBCUTANEOUS at 21:09

## 2024-04-13 RX ADMIN — ENOXAPARIN SODIUM 30 MG: 100 INJECTION SUBCUTANEOUS at 09:24

## 2024-04-13 RX ADMIN — FUROSEMIDE 80 MG: 10 INJECTION, SOLUTION INTRAMUSCULAR; INTRAVENOUS at 17:56

## 2024-04-13 ASSESSMENT — PAIN SCALES - GENERAL
PAINLEVEL_OUTOF10: 0
PAINLEVEL_OUTOF10: 0

## 2024-04-13 NOTE — PLAN OF CARE
Problem: Safety - Adult  Goal: Free from fall injury  4/13/2024 1146 by Codie Devine RN  Outcome: Progressing  Flowsheets (Taken 4/13/2024 1143)  Free From Fall Injury: Instruct family/caregiver on patient safety  4/13/2024 0100 by Bee Lugo RN  Outcome: Progressing     Problem: Discharge Planning  Goal: Discharge to home or other facility with appropriate resources  4/13/2024 1146 by Codie Devine RN  Outcome: Progressing  4/13/2024 0100 by Bee Lugo RN  Outcome: Progressing     Problem: Skin/Tissue Integrity  Goal: Absence of new skin breakdown  Description: 1.  Monitor for areas of redness and/or skin breakdown  2.  Assess vascular access sites hourly  3.  Every 4-6 hours minimum:  Change oxygen saturation probe site  4.  Every 4-6 hours:  If on nasal continuous positive airway pressure, respiratory therapy assess nares and determine need for appliance change or resting period.  4/13/2024 1146 by Codie Devine RN  Outcome: Progressing  4/13/2024 0100 by Bee Lugo RN  Outcome: Progressing     Problem: Chronic Conditions and Co-morbidities  Goal: Patient's chronic conditions and co-morbidity symptoms are monitored and maintained or improved  4/13/2024 1146 by Codie Devine RN  Outcome: Progressing  4/13/2024 0100 by Bee Lugo RN  Outcome: Progressing     Problem: Neurosensory - Adult  Goal: Achieves stable or improved neurological status  4/13/2024 1146 by Codie Devine RN  Outcome: Progressing  Flowsheets (Taken 4/13/2024 0806)  Achieves stable or improved neurological status: Assess for and report changes in neurological status  4/13/2024 0100 by Bee Lugo RN  Outcome: Progressing  Goal: Absence of seizures  4/13/2024 1146 by Codie Devine RN  Outcome: Progressing  Flowsheets (Taken 4/13/2024 0806)  Absence of seizures: Monitor for seizure activity.  If seizure occurs, document type and location of movements and any associated

## 2024-04-13 NOTE — PROGRESS NOTES
Lovenox Monitoring  Indication: DVT Prophylaxis  Recent Labs     04/11/24  0319 04/12/24  0524   HGB 8.7* 8.8*    372     Current Weight: 159.4 kg  Est. CrCl = >100 ml/min  Current Dose: 30 mg subcutaneously every 12 hours.  Plan: Change to 40 mg subcutaneously every 12 hours per [facility] P&T Committee Protocol with respect to weight and renal function.  Pharmacy will continue to monitor patient daily and will make dosage adjustments based upon changing renal function.

## 2024-04-13 NOTE — PLAN OF CARE
Problem: Safety - Adult  Goal: Free from fall injury  4/13/2024 0100 by Bee Lugo RN  Outcome: Progressing  4/12/2024 1312 by Codie Devine RN  Outcome: Progressing     Problem: Discharge Planning  Goal: Discharge to home or other facility with appropriate resources  4/13/2024 0100 by Bee Lugo RN  Outcome: Progressing  4/12/2024 1312 by Codie Devine RN  Outcome: Progressing  Flowsheets (Taken 4/12/2024 0835)  Discharge to home or other facility with appropriate resources:   Identify barriers to discharge with patient and caregiver   Arrange for needed discharge resources and transportation as appropriate     Problem: Skin/Tissue Integrity  Goal: Absence of new skin breakdown  Description: 1.  Monitor for areas of redness and/or skin breakdown  2.  Assess vascular access sites hourly  3.  Every 4-6 hours minimum:  Change oxygen saturation probe site  4.  Every 4-6 hours:  If on nasal continuous positive airway pressure, respiratory therapy assess nares and determine need for appliance change or resting period.  4/13/2024 0100 by Bee Lugo RN  Outcome: Progressing  4/12/2024 1312 by Codie Devine RN  Outcome: Progressing     Problem: Chronic Conditions and Co-morbidities  Goal: Patient's chronic conditions and co-morbidity symptoms are monitored and maintained or improved  4/13/2024 0100 by Bee Lugo RN  Outcome: Progressing  4/12/2024 1312 by Codie Devine RN  Outcome: Progressing  Flowsheets (Taken 4/12/2024 0835)  Care Plan - Patient's Chronic Conditions and Co-Morbidity Symptoms are Monitored and Maintained or Improved:   Monitor and assess patient's chronic conditions and comorbid symptoms for stability, deterioration, or improvement   Collaborate with multidisciplinary team to address chronic and comorbid conditions and prevent exacerbation or deterioration     Problem: Neurosensory - Adult  Goal: Achieves stable or improved neurological  status  4/13/2024 0100 by Bee Lugo RN  Outcome: Progressing  4/12/2024 1312 by Codie Devine RN  Outcome: Progressing  Flowsheets (Taken 4/12/2024 0835)  Achieves stable or improved neurological status: Assess for and report changes in neurological status  Goal: Absence of seizures  4/13/2024 0100 by Bee Lugo RN  Outcome: Progressing  4/12/2024 1312 by Codie Devine RN  Outcome: Progressing  Flowsheets (Taken 4/12/2024 0835)  Absence of seizures: Diagnostic studies as ordered  Goal: Remains free of injury related to seizures activity  4/13/2024 0100 by Bee Lugo RN  Outcome: Progressing  4/12/2024 1312 by Codie Devine RN  Outcome: Progressing  Flowsheets (Taken 4/12/2024 0835)  Remains free of injury related to seizure activity: Maintain airway, patient safety  and administer oxygen as ordered  Goal: Achieves maximal functionality and self care  4/13/2024 0100 by Bee Lugo RN  Outcome: Progressing  4/12/2024 1312 by Codie Devine RN  Outcome: Progressing  Flowsheets (Taken 4/12/2024 0835)  Achieves maximal functionality and self care: Monitor swallowing and airway patency with patient fatigue and changes in neurological status     Problem: Cardiovascular - Adult  Goal: Maintains optimal cardiac output and hemodynamic stability  4/13/2024 0100 by Bee Lugo RN  Outcome: Progressing  4/12/2024 1312 by Codie Devine RN  Outcome: Progressing  Flowsheets (Taken 4/12/2024 0835)  Maintains optimal cardiac output and hemodynamic stability:   Monitor blood pressure and heart rate   Monitor urine output and notify Licensed Independent Practitioner for values outside of normal range  Goal: Absence of cardiac dysrhythmias or at baseline  4/13/2024 0100 by Bee Lugo RN  Outcome: Progressing  4/12/2024 1312 by Codie Devine RN  Outcome: Progressing  Flowsheets (Taken 4/12/2024 0835)  Absence of cardiac dysrhythmias or at baseline:

## 2024-04-13 NOTE — PROGRESS NOTES
Cardiology Progress Note                                        Admit Date: 4/11/2024    Assessment/Plan:     CHF; acute on chronic systolic HF; clinically improving; despite good diuresis not much weight loss; continue IV Lasix for today  Cardiomyopathy; severe and closely followed by [facility]; would benefit from BiV ICD  HTN; acceptable    Kermit Paulino is a 44 y.o. male with     PROBLEM LIST:  Patient Active Problem List    Diagnosis Date Noted    CHF (NYHA class IV, ACC/AHA stage D) ([facility]) 04/11/2024    CHF exacerbation ([facility]) 10/25/2022    Chest pain 01/02/2021         Subjective:     Kermit Paulino reports none.    BP (!) 125/93   Pulse 74   Temp 98.4 °F (36.9 °C) (Oral)   Resp 22   Ht 1.778 m (5' 10\")   Wt (!) 159.4 kg (351 lb 6.6 oz)   SpO2 100%   BMI 50.42 kg/m²   No intake or output data in the 24 hours ending 04/13/24 0841    Objective:      Physical Exam:  HEENT: Perrla, EOMI  Neck: No JVD,  No thyroidmegaly  Resp: CTA bilaterally; No wheezes or rales  CV: RRR s1s2 No murmur no s3  Abd:Soft, Nontender  Ext: No edema  Neuro: Alert and oriented; Nonfocal  Skin: Warm, Dry, Intact  Pulses: 2+ DP/PT/Rad      Telemetry: normal sinus rhythm    Current Facility-Administered Medications   Medication Dose Route Frequency    ipratropium 0.5 mg-albuterol 2.5 mg (DUONEB) nebulizer solution 1 Dose  1 Dose Inhalation Q4H PRN    calcium carbonate (TUMS) chewable tablet 500 mg  500 mg Oral TID PRN    spironolactone (ALDACTONE) tablet 12.5 mg  12.5 mg Oral Daily    sodium chloride flush 0.9 % injection 5-40 mL  5-40 mL IntraVENous 2 times per day    sodium chloride flush 0.9 % injection 5-40 mL  5-40 mL IntraVENous PRN    0.9 % sodium chloride infusion   IntraVENous PRN    potassium chloride (KLOR-CON) extended release tablet 40 mEq  40 mEq Oral PRN    Or    potassium bicarb-citric acid (EFFER-K) effervescent tablet 40 mEq  40 mEq Oral PRN    Or    potassium chloride 10 mEq/100 mL IVPB (Peripheral Line)  10 mEq

## 2024-04-13 NOTE — PROGRESS NOTES
Patient has been on room air throughout the day with no apparent distress. Wearing 2 liters of oxygen throughout the night.

## 2024-04-14 LAB
ANION GAP SERPL CALC-SCNC: 1 MMOL/L (ref 5–15)
ARTERIAL PATENCY WRIST A: POSITIVE
BASE EXCESS BLD CALC-SCNC: 8.2 MMOL/L
BASOPHILS # BLD: 0 K/UL (ref 0–0.1)
BASOPHILS NFR BLD: 0 % (ref 0–1)
BDY SITE: ABNORMAL
BUN SERPL-MCNC: 19 MG/DL (ref 6–20)
BUN/CREAT SERPL: 15 (ref 12–20)
CALCIUM SERPL-MCNC: 8.8 MG/DL (ref 8.5–10.1)
CHLORIDE SERPL-SCNC: 99 MMOL/L (ref 97–108)
CO2 SERPL-SCNC: 36 MMOL/L (ref 21–32)
CREAT SERPL-MCNC: 1.29 MG/DL (ref 0.7–1.3)
DIFFERENTIAL METHOD BLD: ABNORMAL
EOSINOPHIL # BLD: 0.2 K/UL (ref 0–0.4)
EOSINOPHIL NFR BLD: 2 % (ref 0–7)
ERYTHROCYTE [DISTWIDTH] IN BLOOD BY AUTOMATED COUNT: 19.3 % (ref 11.5–14.5)
GAS FLOW.O2 O2 DELIVERY SYS: ABNORMAL
GLUCOSE SERPL-MCNC: 150 MG/DL (ref 65–100)
HCO3 BLD-SCNC: 35.5 MMOL/L (ref 22–26)
HCT VFR BLD AUTO: 35.2 % (ref 36.6–50.3)
HGB BLD-MCNC: 9.2 G/DL (ref 12.1–17)
IMM GRANULOCYTES # BLD AUTO: 0 K/UL (ref 0–0.04)
IMM GRANULOCYTES NFR BLD AUTO: 0 % (ref 0–0.5)
LYMPHOCYTES # BLD: 1.6 K/UL (ref 0.8–3.5)
LYMPHOCYTES NFR BLD: 16 % (ref 12–49)
MCH RBC QN AUTO: 21.3 PG (ref 26–34)
MCHC RBC AUTO-ENTMCNC: 26.1 G/DL (ref 30–36.5)
MCV RBC AUTO: 81.5 FL (ref 80–99)
MONOCYTES # BLD: 0.9 K/UL (ref 0–1)
MONOCYTES NFR BLD: 9 % (ref 5–13)
NEUTS SEG # BLD: 7.2 K/UL (ref 1.8–8)
NEUTS SEG NFR BLD: 73 % (ref 32–75)
NRBC # BLD: 0 K/UL (ref 0–0.01)
NRBC BLD-RTO: 0 PER 100 WBC
O2/TOTAL GAS SETTING VFR VENT: 28 %
PCO2 BLD: 62.1 MMHG (ref 35–45)
PH BLD: 7.37 (ref 7.35–7.45)
PLATELET # BLD AUTO: 419 K/UL (ref 150–400)
PMV BLD AUTO: 10.1 FL (ref 8.9–12.9)
PO2 BLD: 77 MMHG (ref 80–100)
POTASSIUM SERPL-SCNC: 4.1 MMOL/L (ref 3.5–5.1)
RBC # BLD AUTO: 4.32 M/UL (ref 4.1–5.7)
RBC MORPH BLD: ABNORMAL
SAO2 % BLD: 94.3 % (ref 92–97)
SODIUM SERPL-SCNC: 136 MMOL/L (ref 136–145)
SPECIMEN TYPE: ABNORMAL
WBC # BLD AUTO: 9.9 K/UL (ref 4.1–11.1)

## 2024-04-14 PROCEDURE — 36415 COLL VENOUS BLD VENIPUNCTURE: CPT

## 2024-04-14 PROCEDURE — 2580000003 HC RX 258: Performed by: STUDENT IN AN ORGANIZED HEALTH CARE EDUCATION/TRAINING PROGRAM

## 2024-04-14 PROCEDURE — 80048 BASIC METABOLIC PNL TOTAL CA: CPT

## 2024-04-14 PROCEDURE — 2060000000 HC ICU INTERMEDIATE R&B

## 2024-04-14 PROCEDURE — 6370000000 HC RX 637 (ALT 250 FOR IP): Performed by: INTERNAL MEDICINE

## 2024-04-14 PROCEDURE — 6370000000 HC RX 637 (ALT 250 FOR IP): Performed by: STUDENT IN AN ORGANIZED HEALTH CARE EDUCATION/TRAINING PROGRAM

## 2024-04-14 PROCEDURE — 6360000002 HC RX W HCPCS: Performed by: STUDENT IN AN ORGANIZED HEALTH CARE EDUCATION/TRAINING PROGRAM

## 2024-04-14 PROCEDURE — 36600 WITHDRAWAL OF ARTERIAL BLOOD: CPT

## 2024-04-14 PROCEDURE — 85025 COMPLETE CBC W/AUTO DIFF WBC: CPT

## 2024-04-14 PROCEDURE — 82803 BLOOD GASES ANY COMBINATION: CPT

## 2024-04-14 RX ORDER — METOLAZONE 5 MG/1
5 TABLET ORAL ONCE
Status: COMPLETED | OUTPATIENT
Start: 2024-04-14 | End: 2024-04-14

## 2024-04-14 RX ADMIN — ENOXAPARIN SODIUM 40 MG: 100 INJECTION SUBCUTANEOUS at 09:59

## 2024-04-14 RX ADMIN — CARVEDILOL 3.12 MG: 6.25 TABLET, FILM COATED ORAL at 21:31

## 2024-04-14 RX ADMIN — SACUBITRIL AND VALSARTAN 1 TABLET: 49; 51 TABLET, FILM COATED ORAL at 09:59

## 2024-04-14 RX ADMIN — FUROSEMIDE 80 MG: 10 INJECTION, SOLUTION INTRAMUSCULAR; INTRAVENOUS at 09:59

## 2024-04-14 RX ADMIN — SPIRONOLACTONE 12.5 MG: 25 TABLET ORAL at 09:59

## 2024-04-14 RX ADMIN — EMPAGLIFLOZIN 10 MG: 10 TABLET, FILM COATED ORAL at 09:59

## 2024-04-14 RX ADMIN — SODIUM CHLORIDE, PRESERVATIVE FREE 10 ML: 5 INJECTION INTRAVENOUS at 21:31

## 2024-04-14 RX ADMIN — SODIUM CHLORIDE, PRESERVATIVE FREE 10 ML: 5 INJECTION INTRAVENOUS at 10:00

## 2024-04-14 RX ADMIN — METOLAZONE 5 MG: 5 TABLET ORAL at 09:59

## 2024-04-14 RX ADMIN — ENOXAPARIN SODIUM 40 MG: 100 INJECTION SUBCUTANEOUS at 21:32

## 2024-04-14 RX ADMIN — SACUBITRIL AND VALSARTAN 1 TABLET: 49; 51 TABLET, FILM COATED ORAL at 21:31

## 2024-04-14 RX ADMIN — CARVEDILOL 3.12 MG: 6.25 TABLET, FILM COATED ORAL at 09:59

## 2024-04-14 RX ADMIN — FUROSEMIDE 80 MG: 10 INJECTION, SOLUTION INTRAMUSCULAR; INTRAVENOUS at 18:03

## 2024-04-14 ASSESSMENT — PAIN SCALES - GENERAL
PAINLEVEL_OUTOF10: 0

## 2024-04-14 NOTE — PROGRESS NOTES
Cardiology Progress Note                                        Admit Date: 4/11/2024    Assessment/Plan:     CHF; slight weight reduction and good diuresis; continue IV diuretic  Cardiomyopathy; severe  HTN; stable    Kermit Paulino is a 44 y.o. male with     PROBLEM LIST:  Patient Active Problem List    Diagnosis Date Noted    CHF (NYHA class IV, ACC/AHA stage D) ([facility]) 04/11/2024    CHF exacerbation ([facility]) 10/25/2022    Chest pain 01/02/2021         Subjective:     Kermit Paulino reports none.    /86   Pulse 79   Temp 97.7 °F (36.5 °C) (Oral)   Resp 20   Ht 1.778 m (5' 10\")   Wt (!) 158.7 kg (349 lb 12.8 oz)   SpO2 100%   BMI 50.19 kg/m²     Intake/Output Summary (Last 24 hours) at 4/14/2024 0802  Last data filed at 4/13/2024 1200  Gross per 24 hour   Intake 240 ml   Output 700 ml   Net -460 ml       Objective:      Physical Exam:  HEENT: Perrla, EOMI  Neck: No JVD,  No thyroidmegaly  Resp: some rales  CV: RRR s1s2 No murmur no s3  Abd:Soft, Nontender  Ext: 2+ edema  Neuro: Alert and oriented; Nonfocal  Skin: Warm, Dry, Intact  Pulses: 2+ DP/PT/Rad      Telemetry: normal sinus rhythm    Current Facility-Administered Medications   Medication Dose Route Frequency    enoxaparin (LOVENOX) injection 40 mg  40 mg SubCUTAneous BID    ipratropium 0.5 mg-albuterol 2.5 mg (DUONEB) nebulizer solution 1 Dose  1 Dose Inhalation Q4H PRN    calcium carbonate (TUMS) chewable tablet 500 mg  500 mg Oral TID PRN    spironolactone (ALDACTONE) tablet 12.5 mg  12.5 mg Oral Daily    sodium chloride flush 0.9 % injection 5-40 mL  5-40 mL IntraVENous 2 times per day    sodium chloride flush 0.9 % injection 5-40 mL  5-40 mL IntraVENous PRN    0.9 % sodium chloride infusion   IntraVENous PRN    potassium chloride (KLOR-CON) extended release tablet 40 mEq  40 mEq Oral PRN    Or    potassium bicarb-citric acid (EFFER-K) effervescent tablet 40 mEq  40 mEq Oral PRN    Or    potassium chloride 10 mEq/100 mL IVPB (Peripheral Line)

## 2024-04-14 NOTE — PROGRESS NOTES
Patient assessed. High suspicion of sleep apnea due to the following reasons. Will refer for sleep evaluation.    [x] Heart Failure  [] Hypertention  [] Atrial Fibrillation  [x] BMI > 30  [] History of stroke  [] Diabetes  [] Heavy snoring  [x] Witnessed apnea  [x] Hypoxemia

## 2024-04-14 NOTE — PROGRESS NOTES
[facility]                                                                                          Hospitalist Progress Note  Greg Carranza MD  Office Phone: [phone]        Date of Service:  2024  NAME:  Kermit Paulino  :  1980  MRN:  [id number]       Admission Summary:   Kermit Paulino is a 44 y.o. male with history of chronic systolic heart failure (EF 20%), nonischemic cardiomyopathy, chronic hypoxic respiratory failure on 2 L nasal cannula at baseline severe morbid obesity, hypertension, asthma, IV drug abuse, iron deficiency anemia who presented to the emergency department with complaints of shortness of breath and worsening lower extremity swelling.  The patient denies any fever, chills, chest or abdominal pain, nausea, vomiting, cough, congestion, recent illness, palpitations, or dysuria.     Remarkable vitals on ER Presentation: BP to 163/109  Labs Remarkable for: Hemoglobin 8.7, troponin 77, BNP 4930, cr1.34, alb 2.5  ER Images: Chest x-ray showed stable cardiomegaly and no acute abnormality  ER Rx: Lasix 40 mg IV       Interval history / Subjective:   Seen and examined for follow up acute CHF. No new complaints. Remains on 2L O2. Has diuresed better.      Assessment & Plan:     Acute on chronic systolic congestive heart failure NYHA III  Chronic hypoxic respiratory failure secondary to CHF  Nonischemic cardiomyopathy  Baseline 2l NC  -[facility] 5/10/23 with EF 20%  -Continue diuresis with lasix 80mg iv bid  -Strict I&Os with daily weights  -Continue home supplemental oxygen  -ECHO shows EF 15-20%  -PTA Coreg, Entresto, Farxiga  -Appreciate Cardiology  -Outpatient follow up with his cardiology at [facility] for ICD  -Continue diuresis  -Dose of metolazone given per cardiology      Hypertension  -Continue Entresto, Coreg, Lasix     Asthma  -DuoNebs as needed     Iron deficiency anemia  -Patient currently not on iron supplementation  -Advised  noted.      Admission Status:[id number]:::1}      Medications Reviewed:     Current Facility-Administered Medications   Medication Dose Route Frequency    enoxaparin (LOVENOX) injection 40 mg  40 mg SubCUTAneous BID    ipratropium 0.5 mg-albuterol 2.5 mg (DUONEB) nebulizer solution 1 Dose  1 Dose Inhalation Q4H PRN    calcium carbonate (TUMS) chewable tablet 500 mg  500 mg Oral TID PRN    spironolactone (ALDACTONE) tablet 12.5 mg  12.5 mg Oral Daily    sodium chloride flush 0.9 % injection 5-40 mL  5-40 mL IntraVENous 2 times per day    sodium chloride flush 0.9 % injection 5-40 mL  5-40 mL IntraVENous PRN    0.9 % sodium chloride infusion   IntraVENous PRN    potassium chloride (KLOR-CON) extended release tablet 40 mEq  40 mEq Oral PRN    Or    potassium bicarb-citric acid (EFFER-K) effervescent tablet 40 mEq  40 mEq Oral PRN    Or    potassium chloride 10 mEq/100 mL IVPB (Peripheral Line)  10 mEq IntraVENous PRN    magnesium sulfate 2000 mg in 50 mL IVPB premix  2,000 mg IntraVENous PRN    ondansetron (ZOFRAN-ODT) disintegrating tablet 4 mg  4 mg Oral Q8H PRN    Or    ondansetron (ZOFRAN) injection 4 mg  4 mg IntraVENous Q6H PRN    polyethylene glycol (GLYCOLAX) packet 17 g  17 g Oral Daily PRN    acetaminophen (TYLENOL) tablet 650 mg  650 mg Oral Q6H PRN    Or    acetaminophen (TYLENOL) suppository 650 mg  650 mg Rectal Q6H PRN    furosemide (LASIX) injection 80 mg  80 mg IntraVENous BID    carvedilol (COREG) tablet 3.125 mg  3.125 mg Oral BID    sacubitril-valsartan (ENTRESTO) 49-51 MG per tablet 1 tablet  1 tablet Oral BID    empagliflozin (JARDIANCE) tablet 10 mg  10 mg Oral Daily     ______________________________________________________________________  EXPECTED LENGTH OF STAY: 3  ACTUAL LENGTH OF STAY:          3                 Greg Carranza MD

## 2024-04-14 NOTE — PLAN OF CARE
Problem: Safety - Adult  Goal: Free from fall injury  Outcome: Progressing  Flowsheets (Taken 4/14/2024 1004)  Free From Fall Injury: Instruct family/caregiver on patient safety     Problem: Discharge Planning  Goal: Discharge to home or other facility with appropriate resources  Outcome: Progressing  Flowsheets (Taken 4/14/2024 1004)  Discharge to home or other facility with appropriate resources:   Identify barriers to discharge with patient and caregiver   Arrange for needed discharge resources and transportation as appropriate     Problem: Skin/Tissue Integrity  Goal: Absence of new skin breakdown  Description: 1.  Monitor for areas of redness and/or skin breakdown  2.  Assess vascular access sites hourly  3.  Every 4-6 hours minimum:  Change oxygen saturation probe site  4.  Every 4-6 hours:  If on nasal continuous positive airway pressure, respiratory therapy assess nares and determine need for appliance change or resting period.  Outcome: Progressing     Problem: Chronic Conditions and Co-morbidities  Goal: Patient's chronic conditions and co-morbidity symptoms are monitored and maintained or improved  Outcome: Progressing  Flowsheets (Taken 4/14/2024 1004)  Care Plan - Patient's Chronic Conditions and Co-Morbidity Symptoms are Monitored and Maintained or Improved: Monitor and assess patient's chronic conditions and comorbid symptoms for stability, deterioration, or improvement     Problem: Neurosensory - Adult  Goal: Achieves stable or improved neurological status  Outcome: Progressing  Flowsheets (Taken 4/14/2024 1004)  Achieves stable or improved neurological status: Assess for and report changes in neurological status  Goal: Absence of seizures  Outcome: Progressing  Flowsheets (Taken 4/14/2024 1004)  Absence of seizures: Support airway/breathing, administer oxygen as needed  Goal: Achieves maximal functionality and self care  Outcome: Progressing     Problem: Cardiovascular - Adult  Goal: Maintains

## 2024-04-14 NOTE — PROGRESS NOTES
Pt with periods of apnea, hard to rouse. Pt states he is in deep sleep at times. Pt with elevated CO2 on ABG. Pt to receive ABG in morning. Pulmonology consulted, pt on 2 liters of oxygen. No apparent distress.

## 2024-04-15 LAB
ANION GAP SERPL CALC-SCNC: 2 MMOL/L (ref 5–15)
ARTERIAL PATENCY WRIST A: POSITIVE
BASE EXCESS BLD CALC-SCNC: 9.7 MMOL/L
BASOPHILS # BLD: 0 K/UL (ref 0–0.1)
BASOPHILS NFR BLD: 0 % (ref 0–1)
BDY SITE: ABNORMAL
BUN SERPL-MCNC: 16 MG/DL (ref 6–20)
BUN/CREAT SERPL: 12 (ref 12–20)
CALCIUM SERPL-MCNC: 9 MG/DL (ref 8.5–10.1)
CHLORIDE SERPL-SCNC: 92 MMOL/L (ref 97–108)
CO2 SERPL-SCNC: 40 MMOL/L (ref 21–32)
CREAT SERPL-MCNC: 1.33 MG/DL (ref 0.7–1.3)
DIFFERENTIAL METHOD BLD: ABNORMAL
EOSINOPHIL # BLD: 0.3 K/UL (ref 0–0.4)
EOSINOPHIL NFR BLD: 3 % (ref 0–7)
ERYTHROCYTE [DISTWIDTH] IN BLOOD BY AUTOMATED COUNT: 19.1 % (ref 11.5–14.5)
GAS FLOW.O2 O2 DELIVERY SYS: ABNORMAL
GLUCOSE SERPL-MCNC: 139 MG/DL (ref 65–100)
HCO3 BLD-SCNC: 37.3 MMOL/L (ref 22–26)
HCT VFR BLD AUTO: 34.7 % (ref 36.6–50.3)
HGB BLD-MCNC: 8.8 G/DL (ref 12.1–17)
IMM GRANULOCYTES # BLD AUTO: 0 K/UL (ref 0–0.04)
IMM GRANULOCYTES NFR BLD AUTO: 0 % (ref 0–0.5)
LYMPHOCYTES # BLD: 1.6 K/UL (ref 0.8–3.5)
LYMPHOCYTES NFR BLD: 16 % (ref 12–49)
MCH RBC QN AUTO: 20.6 PG (ref 26–34)
MCHC RBC AUTO-ENTMCNC: 25.4 G/DL (ref 30–36.5)
MCV RBC AUTO: 81.1 FL (ref 80–99)
MONOCYTES # BLD: 1 K/UL (ref 0–1)
MONOCYTES NFR BLD: 10 % (ref 5–13)
NEUTS SEG # BLD: 6.8 K/UL (ref 1.8–8)
NEUTS SEG NFR BLD: 71 % (ref 32–75)
NRBC # BLD: 0 K/UL (ref 0–0.01)
NRBC BLD-RTO: 0 PER 100 WBC
O2/TOTAL GAS SETTING VFR VENT: 2 %
PCO2 BLD: 63.6 MMHG (ref 35–45)
PH BLD: 7.38 (ref 7.35–7.45)
PLATELET # BLD AUTO: 426 K/UL (ref 150–400)
PMV BLD AUTO: 10.3 FL (ref 8.9–12.9)
PO2 BLD: 93 MMHG (ref 80–100)
POTASSIUM SERPL-SCNC: 3.3 MMOL/L (ref 3.5–5.1)
RBC # BLD AUTO: 4.28 M/UL (ref 4.1–5.7)
RBC MORPH BLD: ABNORMAL
RBC MORPH BLD: ABNORMAL
SAO2 % BLD: 96.7 % (ref 92–97)
SODIUM SERPL-SCNC: 134 MMOL/L (ref 136–145)
SPECIMEN TYPE: ABNORMAL
WBC # BLD AUTO: 9.7 K/UL (ref 4.1–11.1)

## 2024-04-15 PROCEDURE — 36415 COLL VENOUS BLD VENIPUNCTURE: CPT

## 2024-04-15 PROCEDURE — 6370000000 HC RX 637 (ALT 250 FOR IP): Performed by: STUDENT IN AN ORGANIZED HEALTH CARE EDUCATION/TRAINING PROGRAM

## 2024-04-15 PROCEDURE — 2060000000 HC ICU INTERMEDIATE R&B

## 2024-04-15 PROCEDURE — 85025 COMPLETE CBC W/AUTO DIFF WBC: CPT

## 2024-04-15 PROCEDURE — 80048 BASIC METABOLIC PNL TOTAL CA: CPT

## 2024-04-15 PROCEDURE — 2580000003 HC RX 258: Performed by: STUDENT IN AN ORGANIZED HEALTH CARE EDUCATION/TRAINING PROGRAM

## 2024-04-15 PROCEDURE — 6360000002 HC RX W HCPCS: Performed by: STUDENT IN AN ORGANIZED HEALTH CARE EDUCATION/TRAINING PROGRAM

## 2024-04-15 PROCEDURE — 94060 EVALUATION OF WHEEZING: CPT

## 2024-04-15 PROCEDURE — 94729 DIFFUSING CAPACITY: CPT

## 2024-04-15 PROCEDURE — 94726 PLETHYSMOGRAPHY LUNG VOLUMES: CPT

## 2024-04-15 PROCEDURE — 94010 BREATHING CAPACITY TEST: CPT

## 2024-04-15 PROCEDURE — 82803 BLOOD GASES ANY COMBINATION: CPT

## 2024-04-15 PROCEDURE — 6370000000 HC RX 637 (ALT 250 FOR IP): Performed by: NURSE PRACTITIONER

## 2024-04-15 PROCEDURE — 36600 WITHDRAWAL OF ARTERIAL BLOOD: CPT

## 2024-04-15 RX ORDER — SPIRONOLACTONE 25 MG/1
25 TABLET ORAL DAILY
Status: DISCONTINUED | OUTPATIENT
Start: 2024-04-15 | End: 2024-04-17 | Stop reason: HOSPADM

## 2024-04-15 RX ADMIN — EMPAGLIFLOZIN 10 MG: 10 TABLET, FILM COATED ORAL at 08:44

## 2024-04-15 RX ADMIN — FUROSEMIDE 80 MG: 10 INJECTION, SOLUTION INTRAMUSCULAR; INTRAVENOUS at 08:44

## 2024-04-15 RX ADMIN — SACUBITRIL AND VALSARTAN 1 TABLET: 49; 51 TABLET, FILM COATED ORAL at 22:10

## 2024-04-15 RX ADMIN — SODIUM CHLORIDE, PRESERVATIVE FREE 10 ML: 5 INJECTION INTRAVENOUS at 22:10

## 2024-04-15 RX ADMIN — CARVEDILOL 3.12 MG: 6.25 TABLET, FILM COATED ORAL at 22:10

## 2024-04-15 RX ADMIN — SPIRONOLACTONE 25 MG: 25 TABLET ORAL at 08:44

## 2024-04-15 RX ADMIN — CARVEDILOL 3.12 MG: 6.25 TABLET, FILM COATED ORAL at 08:44

## 2024-04-15 RX ADMIN — ENOXAPARIN SODIUM 40 MG: 100 INJECTION SUBCUTANEOUS at 22:10

## 2024-04-15 RX ADMIN — SODIUM CHLORIDE, PRESERVATIVE FREE 10 ML: 5 INJECTION INTRAVENOUS at 08:45

## 2024-04-15 RX ADMIN — CALCIUM CARBONATE (ANTACID) CHEW TAB 500 MG 500 MG: 500 CHEW TAB at 08:53

## 2024-04-15 RX ADMIN — SACUBITRIL AND VALSARTAN 1 TABLET: 49; 51 TABLET, FILM COATED ORAL at 08:44

## 2024-04-15 RX ADMIN — FUROSEMIDE 80 MG: 10 INJECTION, SOLUTION INTRAMUSCULAR; INTRAVENOUS at 17:44

## 2024-04-15 RX ADMIN — ENOXAPARIN SODIUM 40 MG: 100 INJECTION SUBCUTANEOUS at 08:44

## 2024-04-15 ASSESSMENT — PAIN SCALES - GENERAL
PAINLEVEL_OUTOF10: 0

## 2024-04-15 NOTE — PROGRESS NOTES
Cardiology Progress Note  4/15/2024    Admit Date: 2024  Admit Diagnosis: CHF (NYHA class IV, ACC/AHA stage D) ([facility]) [I50.84]  Acute on chronic systolic congestive heart failure (HCC) [I50.23]  Other hypervolemia [E87.79]  CC:  HF    Assessment/Recommendations:  Acute on chronic HFrEF - NYHA III. H/o NICM with severely reduced LVEF 10-15%.  - followed at [facility] for HF but has not been consistent with follow up  - continue IV furosemide 80 mg BID. Hold metolazone  - continue coreg and entresto  - will replace home farxiga with jardiance while IP  - increase spironolactone to 25 mg daily  - 7L UOP yesterday but only lost 2 lbs. Discussed limiting fluid intake. 2L fluid restriction  - has been referred for ICD before at [facility]. Has a NSIVCD with  ms so he may benefit from CRT. Encouraged to follow up at [facility] re this post discharge  - strict I/O and daily weights     Acute on chronic hypoxic and hypercarbic respiratory failure  - pulmonology consulted  - monitor with diuresis. Likely has KELSEY     HTN  - titrate entresto as tolerated     Morbid obesity     Discussed with RN.    Thank you for this consult. We will continue to follow along. Please contact us for any further questions or concerns.    Hospital problem list     Principal Problem:    CHF (NYHA class IV, ACC/AHA stage D) ([facility])  Resolved Problems:    * No resolved hospital problems. *                                                        Subjective:     Dyspnea better. 7L UOP but weight only down 2 lbs.    ROS: All other systems reviewed and were negative other than mentioned above.                                            No change in family and social history from H&P/Consult note.    Objective:    Physical Exam:  24 hr VS reviewed, overall VSSAF  Temp (24hrs), Av.3 °F (36.8 °C), Min:97.9 °F (36.6 °C), Max:98.9 °F (37.2 °C)    Patient Vitals for the past 8 hrs:   Pulse   04/15/24 1300 83   04/15/24 1200 81   04/15/24 1000 93

## 2024-04-15 NOTE — PROGRESS NOTES
[facility]                                                                                          Hospitalist Progress Note  Greg Carranza MD  Office Phone: [phone]        Date of Service:  4/15/2024  NAME:  Kermit Paulino  :  1980  MRN:  [id number]       Admission Summary:   Kermit Paulino is a 44 y.o. male with history of chronic systolic heart failure (EF 20%), nonischemic cardiomyopathy, chronic hypoxic respiratory failure on 2 L nasal cannula at baseline severe morbid obesity, hypertension, asthma, IV drug abuse, iron deficiency anemia who presented to the emergency department with complaints of shortness of breath and worsening lower extremity swelling.  The patient denies any fever, chills, chest or abdominal pain, nausea, vomiting, cough, congestion, recent illness, palpitations, or dysuria.     Remarkable vitals on ER Presentation: BP to 163/109  Labs Remarkable for: Hemoglobin 8.7, troponin 77, BNP 4930, cr1.34, alb 2.5  ER Images: Chest x-ray showed stable cardiomegaly and no acute abnormality  ER Rx: Lasix 40 mg IV       Interval history / Subjective:   Seen and examined for follow up acute CHF. Denies new complaints. No SOB. Looks to be doing well. He says that he was very sleepy in the day yesterday because he has not been sleeping well.      Assessment & Plan:     Acute on chronic systolic congestive heart failure NYHA III  Chronic hypoxic respiratory failure secondary to CHF  Nonischemic cardiomyopathy  Baseline 2l NC  -[facility] 5/10/23 with EF 20%  -Continue diuresis with lasix 80mg iv bid  -Strict I&Os with daily weights  -Continue home supplemental oxygen  -ECHO shows EF 15-20%  -PTA Coreg, Entresto, Farxiga  -Appreciate Cardiology  -Outpatient follow up with his cardiology [facility] for ICD  -Continue diuresis  -Patient put out about 7 L yesterday  -Pending further cardio recommendations      Hypertension  -Continue Entresto, Coreg,  meningeal signs  Chest: Clear to auscultation bilaterally   CVS: S1 S2 heard, Capillary refill less than 2 seconds  Abd: soft/ non tender, non distended, BS physiological,   Ext: no clubbing, no cyanosis, no edema, brisk 2+ DP pulses  Neuro/Psych: pleasant mood and affect, CN 2-12 grossly intact, sensory grossly within normal limit, Strength 5/5 in all extremities  Skin: warm     Data Review:    Review and/or order of clinical lab test      I have personally and independently reviewed all pertinent labs, diagnostic studies, imaging, and have provided independent interpretation of the same.     Labs:     Recent Labs     04/14/24 0636   WBC 9.9   HGB 9.2*   HCT 35.2*   *       Recent Labs     04/13/24 0449 04/14/24 0636 04/15/24  0642    136 134*   K 3.8 4.1 3.3*   CL 99 99 92*   CO2 36* 36* 40*   BUN 17 19 16       No results for input(s): \"ALT\", \"TP\", \"ALB\", \"GLOB\", \"GGT\", \"AML\" in the last 72 hours.    Invalid input(s): \"SGOT\", \"GPT\", \"AP\", \"TBIL\", \"TBILI\", \"AMYP\", \"LPSE\", \"HLPSE\"    No results for input(s): \"INR\", \"APTT\" in the last 72 hours.    Invalid input(s): \"PTP\"   Recent Labs     04/13/24  0449   TIBC 439        No results found for: \"FOL\", \"RBCF\"   No results for input(s): \"PH\", \"PCO2\", \"PO2\" in the last 72 hours.  No results for input(s): \"CPK\" in the last 72 hours.    Invalid input(s): \"CPKMB\", \"CKNDX\", \"TROIQ\"  Lab Results   Component Value Date/Time    CHOL 168 03/30/2023 03:09 PM    HDL 40 03/30/2023 03:09 PM     No results found for: \"GLUCPOC\"  [unfilled]    Notes reviewed from all clinical/nonclinical/nursing services involved in patient's clinical care. Care coordination discussions were held with appropriate clinical/nonclinical/ nursing providers based on care coordination needs.         Patients current active Medications were reviewed, considered, added and adjusted based on the clinical condition today.      Home Medications were reconciled to the best of my ability given all

## 2024-04-15 NOTE — PLAN OF CARE
Problem: Safety - Adult  Goal: Free from fall injury  Outcome: Progressing  Flowsheets (Taken 4/15/2024 1132)  Free From Fall Injury: Instruct family/caregiver on patient safety

## 2024-04-15 NOTE — CONSULTS
Pulmonary, Critical Care, and Sleep Medicine~Consult Note    Name: Kermit Paulino MRN: [id number]   : 1980 Hospital: [facility]   Date: 4/15/2024 11:36 AM Admission: 2024     Impression Plan   Acute on chronic hypoxic respiratory failure in the setting of chronic hypercapnia.  On 2 L at home  Decomp heart failure.  EF 10 to 15% with evidence of pulmonary hypertension  Restrictive lung disease, OHS  Asthma, likely developing obstructive phenotype.  Former smoker 20 years  Asthma  Significant obesity  Hypertension Spirometry  We will set him up with a noninvasive ventilation closer to discharge  We discussed and he was in agreement  O2 titration about 90%  Bronchodilators  Lasix, Jardiance  DVT prophylaxis     Daily Progression:    Consult Note requested by hospitalist service    Patient presented for admission on 2024 secondary to worsening shortness of breath.  He has a known history of nonischemic cardiomyopathy with an EF of 20%.  On baseline 2 L of oxygen.  He does have a history of smoking and IV drug use.  He states that he stopped smoking around 1.5 years ago and was a smoker for approximately 20 years.  He is on as needed albuterol but no other maintenance medications.  He has a long history of suspected sleep apnea but has never followed up with a sleep specialist despite several referrals.  [place] [facility] [place].    Admitting issue was worsening shortness of breath and lower extremity swelling.  Has seen his weights go up.  Admitting proBNP was 4930.  BNP was significantly elevated as well.  Creatinine was slightly elevated.  Chest x-ray on  showed stable cardiomegaly with no acute pleural disease.  Echo on admission showed an EF of 10 to 15% with a severely dilated left ventricle and severe global hypokinesis, elevated RVSP at 49 mmHg.  TAPSE was 0.9 cm.  TR max velocity was 2.92 M/S.  Estimated RA pressure was 15 mmHg.    I have reviewed the labs  suppository 650 mg  650 mg Rectal Q6H PRN    furosemide (LASIX) injection 80 mg  80 mg IntraVENous BID    carvedilol (COREG) tablet 3.125 mg  3.125 mg Oral BID    sacubitril-valsartan (ENTRESTO) 49-51 MG per tablet 1 tablet  1 tablet Oral BID    empagliflozin (JARDIANCE) tablet 10 mg  10 mg Oral Daily       Labs:  ABG  Latest Reference Range & Units 04/14/24 16:11 04/15/24 07:22   DEVICE -   NASAL CANNULA NASAL CANNULA   Site -   RIGHT RADIAL LEFT RADIAL   POC pH 7.35 - 7.45   7.37 7.38   POC pCO2 35.0 - 45.0 MMHG 62.1 (H) 63.6 (H)   POC PO2 80 - 100 MMHG 77 (L) 93   POC HCO3 22 - 26 MMOL/L 35.5 (H) 37.3 (H)   POC O2 SAT 92 - 97 % 94.3 96.7   POC Landon's Test -   Positive Positive   FIO2 % 28 2   Base Excess mmol/L 8.2 9.7   (H): Data is abnormally high  (L): Data is abnormally low     CBC Recent Labs     04/14/24  0636 04/15/24  0642   WBC 9.9 9.7   HGB 9.2* 8.8*   HCT 35.2* 34.7*   * 426*   MCV 81.5 81.1   MCH 21.3* 20.6*        Metabolic  Panel Recent Labs     04/13/24  0449 04/14/24  0636 04/15/24  0642    136 134*   K 3.8 4.1 3.3*   CL 99 99 92*   CO2 36* 36* 40*   BUN 17 19 16        Pertinent Labs                Virgilio Parsons PA-C  4/15/2024

## 2024-04-16 LAB
ANION GAP SERPL CALC-SCNC: 7 MMOL/L (ref 5–15)
BASOPHILS # BLD: 0 K/UL (ref 0–0.1)
BASOPHILS NFR BLD: 0 % (ref 0–1)
BUN SERPL-MCNC: 20 MG/DL (ref 6–20)
BUN/CREAT SERPL: 13 (ref 12–20)
CALCIUM SERPL-MCNC: 9.1 MG/DL (ref 8.5–10.1)
CHLORIDE SERPL-SCNC: 88 MMOL/L (ref 97–108)
CO2 SERPL-SCNC: 36 MMOL/L (ref 21–32)
CREAT SERPL-MCNC: 1.5 MG/DL (ref 0.7–1.3)
DIFFERENTIAL METHOD BLD: ABNORMAL
EOSINOPHIL # BLD: 0.2 K/UL (ref 0–0.4)
EOSINOPHIL NFR BLD: 2 % (ref 0–7)
ERYTHROCYTE [DISTWIDTH] IN BLOOD BY AUTOMATED COUNT: 19.1 % (ref 11.5–14.5)
GLUCOSE SERPL-MCNC: 172 MG/DL (ref 65–100)
HCT VFR BLD AUTO: 34.8 % (ref 36.6–50.3)
HGB BLD-MCNC: 9.5 G/DL (ref 12.1–17)
IMM GRANULOCYTES # BLD AUTO: 0 K/UL (ref 0–0.04)
IMM GRANULOCYTES NFR BLD AUTO: 0 % (ref 0–0.5)
LYMPHOCYTES # BLD: 1.6 K/UL (ref 0.8–3.5)
LYMPHOCYTES NFR BLD: 18 % (ref 12–49)
MCH RBC QN AUTO: 21.2 PG (ref 26–34)
MCHC RBC AUTO-ENTMCNC: 27.3 G/DL (ref 30–36.5)
MCV RBC AUTO: 77.5 FL (ref 80–99)
MONOCYTES # BLD: 0.8 K/UL (ref 0–1)
MONOCYTES NFR BLD: 9 % (ref 5–13)
NEUTS SEG # BLD: 6.2 K/UL (ref 1.8–8)
NEUTS SEG NFR BLD: 71 % (ref 32–75)
NRBC # BLD: 0 K/UL (ref 0–0.01)
NRBC BLD-RTO: 0 PER 100 WBC
NT PRO BNP: 1127 PG/ML
PLATELET # BLD AUTO: 396 K/UL (ref 150–400)
PMV BLD AUTO: 9.6 FL (ref 8.9–12.9)
POTASSIUM SERPL-SCNC: 3.3 MMOL/L (ref 3.5–5.1)
RBC # BLD AUTO: 4.49 M/UL (ref 4.1–5.7)
RBC MORPH BLD: ABNORMAL
SODIUM SERPL-SCNC: 131 MMOL/L (ref 136–145)
WBC # BLD AUTO: 8.8 K/UL (ref 4.1–11.1)

## 2024-04-16 PROCEDURE — 2580000003 HC RX 258: Performed by: STUDENT IN AN ORGANIZED HEALTH CARE EDUCATION/TRAINING PROGRAM

## 2024-04-16 PROCEDURE — 6370000000 HC RX 637 (ALT 250 FOR IP): Performed by: STUDENT IN AN ORGANIZED HEALTH CARE EDUCATION/TRAINING PROGRAM

## 2024-04-16 PROCEDURE — 36415 COLL VENOUS BLD VENIPUNCTURE: CPT

## 2024-04-16 PROCEDURE — 2060000000 HC ICU INTERMEDIATE R&B

## 2024-04-16 PROCEDURE — 80048 BASIC METABOLIC PNL TOTAL CA: CPT

## 2024-04-16 PROCEDURE — 94760 N-INVAS EAR/PLS OXIMETRY 1: CPT

## 2024-04-16 PROCEDURE — 2700000000 HC OXYGEN THERAPY PER DAY

## 2024-04-16 PROCEDURE — 83880 ASSAY OF NATRIURETIC PEPTIDE: CPT

## 2024-04-16 PROCEDURE — 6360000002 HC RX W HCPCS: Performed by: STUDENT IN AN ORGANIZED HEALTH CARE EDUCATION/TRAINING PROGRAM

## 2024-04-16 PROCEDURE — 85025 COMPLETE CBC W/AUTO DIFF WBC: CPT

## 2024-04-16 RX ORDER — POTASSIUM CHLORIDE 750 MG/1
40 TABLET, FILM COATED, EXTENDED RELEASE ORAL DAILY
Status: DISCONTINUED | OUTPATIENT
Start: 2024-04-16 | End: 2024-04-16

## 2024-04-16 RX ORDER — TORSEMIDE 100 MG/1
100 TABLET ORAL 2 TIMES DAILY
Status: DISCONTINUED | OUTPATIENT
Start: 2024-04-16 | End: 2024-04-17 | Stop reason: HOSPADM

## 2024-04-16 RX ORDER — DIPHENHYDRAMINE HCL 25 MG
25 CAPSULE ORAL EVERY 6 HOURS PRN
Status: DISCONTINUED | OUTPATIENT
Start: 2024-04-16 | End: 2024-04-17 | Stop reason: HOSPADM

## 2024-04-16 RX ORDER — POTASSIUM CHLORIDE 750 MG/1
20 TABLET, FILM COATED, EXTENDED RELEASE ORAL DAILY
Status: DISCONTINUED | OUTPATIENT
Start: 2024-04-16 | End: 2024-04-17 | Stop reason: HOSPADM

## 2024-04-16 RX ADMIN — DIPHENHYDRAMINE HYDROCHLORIDE 25 MG: 25 CAPSULE ORAL at 16:44

## 2024-04-16 RX ADMIN — SACUBITRIL AND VALSARTAN 1 TABLET: 49; 51 TABLET, FILM COATED ORAL at 09:00

## 2024-04-16 RX ADMIN — SPIRONOLACTONE 25 MG: 25 TABLET ORAL at 09:00

## 2024-04-16 RX ADMIN — ENOXAPARIN SODIUM 40 MG: 100 INJECTION SUBCUTANEOUS at 21:03

## 2024-04-16 RX ADMIN — ENOXAPARIN SODIUM 40 MG: 100 INJECTION SUBCUTANEOUS at 09:01

## 2024-04-16 RX ADMIN — CARVEDILOL 3.12 MG: 6.25 TABLET, FILM COATED ORAL at 21:02

## 2024-04-16 RX ADMIN — EMPAGLIFLOZIN 10 MG: 10 TABLET, FILM COATED ORAL at 09:00

## 2024-04-16 RX ADMIN — SODIUM CHLORIDE, PRESERVATIVE FREE 10 ML: 5 INJECTION INTRAVENOUS at 21:03

## 2024-04-16 RX ADMIN — POTASSIUM CHLORIDE 20 MEQ: 750 TABLET, FILM COATED, EXTENDED RELEASE ORAL at 09:00

## 2024-04-16 RX ADMIN — TORSEMIDE 100 MG: 100 TABLET ORAL at 09:00

## 2024-04-16 RX ADMIN — TORSEMIDE 100 MG: 100 TABLET ORAL at 16:44

## 2024-04-16 RX ADMIN — CARVEDILOL 3.12 MG: 6.25 TABLET, FILM COATED ORAL at 09:00

## 2024-04-16 RX ADMIN — SODIUM CHLORIDE, PRESERVATIVE FREE 10 ML: 5 INJECTION INTRAVENOUS at 09:02

## 2024-04-16 ASSESSMENT — PAIN SCALES - GENERAL
PAINLEVEL_OUTOF10: 0
PAINLEVEL_OUTOF10: 0

## 2024-04-16 NOTE — CARE COORDINATION
Transition of Care Plan:    RUR: 11% Low  Prior Level of Functioning: Independent  Disposition: Home  Follow up appointments: 4/19/24 @ 1pm with Dr. Love  DME needed: [employer] order approved  Transportation at discharge: Family  IM/IMM Medicare/[employer] letter given: NA  Caregiver Contact: NA  Discharge Caregiver contacted prior to discharge? NA  Care Conference needed? No  Barriers to discharge: None    CM faxed trilogy order form to [employer]. DC pending cardiology clearance. CM to monitor.     2:22 Patient has been approved for [employer]. CM to follow up with [employer] rep Eva [phone].     Ailin Ohara, MS  [phone]

## 2024-04-16 NOTE — PROGRESS NOTES
[facility]                                                                                          Hospitalist Progress Note  Greg Carranza MD  Office Phone: [phone]        Date of Service:  2024  NAME:  Kermit Paulino  :  1980  MRN:  [id number]       Admission Summary:   Kermit Paulino is a 44 y.o. male with history of chronic systolic heart failure (EF 20%), nonischemic cardiomyopathy, chronic hypoxic respiratory failure on 2 L nasal cannula at baseline severe morbid obesity, hypertension, asthma, IV drug abuse, iron deficiency anemia who presented to the emergency department with complaints of shortness of breath and worsening lower extremity swelling.  The patient denies any fever, chills, chest or abdominal pain, nausea, vomiting, cough, congestion, recent illness, palpitations, or dysuria.     Remarkable vitals on ER Presentation: BP to 163/109  Labs Remarkable for: Hemoglobin 8.7, troponin 77, BNP 4930, cr1.34, alb 2.5  ER Images: Chest x-ray showed stable cardiomegaly and no acute abnormality  ER Rx: Lasix 40 mg IV       Interval history / Subjective:   Seen and examined for follow up acute CHF. No acute complaints at this time. Discussed plan for likely DC tomorrow.      Assessment & Plan:     Acute on chronic systolic congestive heart failure NYHA III  Chronic hypoxic respiratory failure secondary to CHF  Nonischemic cardiomyopathy  Baseline 2l NC  -[facility] 5/10/[facility] with EF 20%  -Continue diuresis with lasix 80mg iv bid  -Strict I&Os with daily weights  -Continue home supplemental oxygen  -ECHO shows EF 15-20%  -PTA Coreg, Entresto, Farxiga  -Appreciate Cardiology  -Outpatient follow up with his cardiology at [facility] for ICD  -Back to home dose of torsemide  -monitor renal function     Hypertension  -Continue Entresto, Coreg, Lasix     Asthma  -DuoNebs as needed     Iron deficiency anemia  -Patient currently not on iron

## 2024-04-16 NOTE — PROGRESS NOTES
Topics    Alcohol use: Yes      Family History   Problem Relation Age of Onset    Hypertension Mother      OBJECTIVE:     Vital Signs:     BP (!) 112/59   Pulse 86   Temp 97.6 °F (36.4 °C) (Oral)   Resp 18   Ht 1.778 m (5' 10\")   Wt (!) 151.8 kg (334 lb 9.6 oz)   SpO2 91%   BMI 48.01 kg/m²    Temp (24hrs), Av.4 °F (36.9 °C), Min:97.6 °F (36.4 °C), Max:98.8 °F (37.1 °C)     Intake/Output:     Last shift: No intake/output data recorded.    Last 3 shifts:  1901 -  0700  In: 1800 [P.O.:1800]  Out: 6900 [Urine:6900]        Intake/Output Summary (Last 24 hours) at 2024 1225  Last data filed at 2024 0541  Gross per 24 hour   Intake 1800 ml   Output 4300 ml   Net -2500 ml         Physical Exam:                                        Exam Findings Other   General: No resp distress noted, appears stated age    HEENT:  No ulcers, JVD not elevated, no cervical LAD    Chest: No pectus deformity, normal chest rise b/l    HEART:  RRR, no murmurs/rubs/gallops    Lungs:  CTA b/l, no rhonchi/crackles/wheeze, diminished BS at bases    ABD: Soft/NT, non rigid mildly distended    EXT: No cyanosis/clubbing/edema, normal peripheral pulses    Skin: No rashes or ulcers, no mottling    Neuro: A/O x 3        Medications:  Current Facility-Administered Medications   Medication Dose Route Frequency    potassium chloride (KLOR-CON) extended release tablet 20 mEq  20 mEq Oral Daily    torsemide (DEMADEX) tablet 100 mg  100 mg Oral BID    spironolactone (ALDACTONE) tablet 25 mg  25 mg Oral Daily    enoxaparin (LOVENOX) injection 40 mg  40 mg SubCUTAneous BID    ipratropium 0.5 mg-albuterol 2.5 mg (DUONEB) nebulizer solution 1 Dose  1 Dose Inhalation Q4H PRN    calcium carbonate (TUMS) chewable tablet 500 mg  500 mg Oral TID PRN    sodium chloride flush 0.9 % injection 5-40 mL  5-40 mL IntraVENous 2 times per day    0.9 % sodium chloride infusion   IntraVENous PRN    magnesium sulfate 2000 mg in 50 mL IVPB premix   2,000 mg IntraVENous PRN    ondansetron (ZOFRAN-ODT) disintegrating tablet 4 mg  4 mg Oral Q8H PRN    Or    ondansetron (ZOFRAN) injection 4 mg  4 mg IntraVENous Q6H PRN    polyethylene glycol (GLYCOLAX) packet 17 g  17 g Oral Daily PRN    acetaminophen (TYLENOL) tablet 650 mg  650 mg Oral Q6H PRN    Or    acetaminophen (TYLENOL) suppository 650 mg  650 mg Rectal Q6H PRN    carvedilol (COREG) tablet 3.125 mg  3.125 mg Oral BID    sacubitril-valsartan (ENTRESTO) 49-51 MG per tablet 1 tablet  1 tablet Oral BID    empagliflozin (JARDIANCE) tablet 10 mg  10 mg Oral Daily       Labs:  ABG  Latest Reference Range & Units 04/14/24 16:11 04/15/24 07:22   DEVICE -   NASAL CANNULA NASAL CANNULA   Site -   RIGHT RADIAL LEFT RADIAL   POC pH 7.35 - 7.45   7.37 7.38   POC pCO2 35.0 - 45.0 MMHG 62.1 (H) 63.6 (H)   POC PO2 80 - 100 MMHG 77 (L) 93   POC HCO3 22 - 26 MMOL/L 35.5 (H) 37.3 (H)   POC O2 SAT 92 - 97 % 94.3 96.7   POC Landon's Test -   Positive Positive   FIO2 % 28 2   Base Excess mmol/L 8.2 9.7   (H): Data is abnormally high  (L): Data is abnormally low     CBC Recent Labs     04/14/24 0636 04/15/24  0642 04/16/24  0351   WBC 9.9 9.7 8.8   HGB 9.2* 8.8* 9.5*   HCT 35.2* 34.7* 34.8*   * 426* 396   MCV 81.5 81.1 77.5*   MCH 21.3* 20.6* 21.2*          Metabolic  Panel Recent Labs     04/14/24  0636 04/15/24  0642 04/16/24  0351    134* 131*   K 4.1 3.3* 3.3*   CL 99 92* 88*   CO2 36* 40* 36*   BUN 19 16 20          Pertinent Labs                Virgilio Parsons PA-C  4/16/2024

## 2024-04-16 NOTE — PROGRESS NOTES
Cardiology Progress Note  2024    Admit Date: 2024  Admit Diagnosis: CHF (NYHA class IV, ACC/AHA stage D) ([facility]) [I50.84]  Acute on chronic systolic congestive heart failure (HCC) [I50.23]  Other hypervolemia [E87.79]  CC:  HF    Assessment/Recommendations:  Acute on chronic HFrEF - NYHA III. H/o NICM with severely reduced LVEF 10-15%.  - followed at [facility] for HF but has not been consistent with follow up  - switch to home PO torsemide 100 mg BID  - continue coreg, jardiance and entresto  - continue spironolactone to 25 mg daily  - 2L fluid restriction  - has been [facility]. Has a NSIVCD with  ms so he may benefit from CRT. Encouraged to follow up at [facility] re this post discharge  - strict I/O and daily weights  - monitor on PO diuretics today. If renal function stable tomorrow, would be ok for discharge from cardiac standpoint     Acute on chronic hypoxic and hypercarbic respiratory failure  - pulmonology consulted, recommending NIPPV     HTN  - as above     Morbid obesity       Thank you for this consult. We will continue to follow along. Please contact us for any further questions or concerns.    Hospital problem list     Principal Problem:    CHF (NYHA class IV, ACC/AHA stage D) ([facility])  Resolved Problems:    * No resolved hospital problems. *                                                        Subjective:     Weight down to 334 lbs from 347, possibly inaccurate. His breathing is better. No other complaints    ROS: All other systems reviewed and were negative other than mentioned above.                                            No change in family and social history from H&P/Consult note.    Objective:    Physical Exam:  24 hr VS reviewed, overall VSSAF  Temp (24hrs), Av.5 °F (36.9 °C), Min:97.9 °F (36.6 °C), Max:98.8 °F (37.1 °C)    Patient Vitals for the past 8 hrs:   Pulse   24 0803 50   24 0801 87   24 0600 89   24 0400 81    04/16/24 0200 80   04/16/24 0101 84    Patient Vitals for the past 8 hrs:   Resp   04/16/24 0801 18   04/16/24 0400 19    Patient Vitals for the past 8 hrs:   BP   04/16/24 0803 99/63   04/16/24 0801 95/62   04/16/24 0400 98/70   04/16/24 0101 (!) 95/57          Intake/Output Summary (Last 24 hours) at 4/16/2024 0817  Last data filed at 4/16/2024 0541  Gross per 24 hour   Intake 1800 ml   Output 4300 ml   Net -2500 ml       General: morbidly obese  HEENT: sclera anicteric, moist mucous membranes  Neck: supple, difficult to assess JVP  CV: regular rate and rhythm, distant heart sounds, no murmurs, rubs or gallops,  Lungs: no respiratory distress, lung CTA  Abdomen: soft, non distended  MSK: chronic lower extremity changes  Skin: warm to touch  Neuro: alert and oriented, answered questions appropriately  Psych: normal mood and affect     Data Review:  Lab results reviewed as noted below.                             Current medications reviewed as noted below.    Telemetry independently reviewed : [x]  Sinus    []  chronic afib     []  par afib    []  NSVT    ECG independently reviewed: []  NSR    []  no significant changes   [x]  no new ECG provided for review    No results for input(s): \"PH\", \"PCO2\", \"PO2\" in the last 72 hours.  No results for input(s): \"CPK\", \"CKMB\", \"TROPONINI\" in the last 72 hours.  Recent Labs     04/14/24  0636 04/15/24  0642 04/16/24  0351    134* 131*   K 4.1 3.3* 3.3*   CL 99 92* 88*   CO2 36* 40* 36*   BUN 19 16 20   WBC 9.9 9.7 8.8   HGB 9.2* 8.8* 9.5*   HCT 35.2* 34.7* 34.8*   * 426* 396     No results for input(s): \"TP\", \"ALB\", \"GLOB\", \"GGT\", \"AML\" in the last 72 hours.    Invalid input(s): \"SGOT\", \"GPT\", \"AP\", \"TBIL\", \"TBILI\", \"AMYP\", \"LPSE\", \"HLPSE\"    No results for input(s): \"INR\", \"APTT\" in the last 72 hours.    Invalid input(s): \"PTP\"   No results for input(s): \"TIBC\", \"FERR\" in the last 72 hours.    Invalid input(s): \"FE\", \"PSAT\"     No results found for:

## 2024-04-16 NOTE — PLAN OF CARE
Problem: Safety - Adult  Goal: Free from fall injury  Outcome: Progressing  Flowsheets (Taken 4/16/2024 1027)  Free From Fall Injury: Instruct family/caregiver on patient safety

## 2024-04-17 VITALS
OXYGEN SATURATION: 98 % | SYSTOLIC BLOOD PRESSURE: 107 MMHG | TEMPERATURE: 98.4 F | BODY MASS INDEX: 45.1 KG/M2 | WEIGHT: 315 LBS | RESPIRATION RATE: 16 BRPM | HEART RATE: 90 BPM | HEIGHT: 70 IN | DIASTOLIC BLOOD PRESSURE: 80 MMHG

## 2024-04-17 LAB
ANION GAP SERPL CALC-SCNC: 6 MMOL/L (ref 5–15)
BASOPHILS # BLD: 0.1 K/UL (ref 0–0.1)
BASOPHILS NFR BLD: 1 % (ref 0–1)
BUN SERPL-MCNC: 24 MG/DL (ref 6–20)
BUN/CREAT SERPL: 16 (ref 12–20)
CALCIUM SERPL-MCNC: 8.7 MG/DL (ref 8.5–10.1)
CHLORIDE SERPL-SCNC: 88 MMOL/L (ref 97–108)
CO2 SERPL-SCNC: 38 MMOL/L (ref 21–32)
CREAT SERPL-MCNC: 1.49 MG/DL (ref 0.7–1.3)
DIFFERENTIAL METHOD BLD: ABNORMAL
EOSINOPHIL # BLD: 0.2 K/UL (ref 0–0.4)
EOSINOPHIL NFR BLD: 3 % (ref 0–7)
ERYTHROCYTE [DISTWIDTH] IN BLOOD BY AUTOMATED COUNT: 19.2 % (ref 11.5–14.5)
GLUCOSE SERPL-MCNC: 193 MG/DL (ref 65–100)
HCT VFR BLD AUTO: 34.2 % (ref 36.6–50.3)
HGB BLD-MCNC: 9.4 G/DL (ref 12.1–17)
IMM GRANULOCYTES # BLD AUTO: 0 K/UL (ref 0–0.04)
IMM GRANULOCYTES NFR BLD AUTO: 0 % (ref 0–0.5)
LYMPHOCYTES # BLD: 1.7 K/UL (ref 0.8–3.5)
LYMPHOCYTES NFR BLD: 21 % (ref 12–49)
MCH RBC QN AUTO: 21.6 PG (ref 26–34)
MCHC RBC AUTO-ENTMCNC: 27.5 G/DL (ref 30–36.5)
MCV RBC AUTO: 78.4 FL (ref 80–99)
MONOCYTES # BLD: 0.8 K/UL (ref 0–1)
MONOCYTES NFR BLD: 10 % (ref 5–13)
NEUTS SEG # BLD: 5.4 K/UL (ref 1.8–8)
NEUTS SEG NFR BLD: 65 % (ref 32–75)
NRBC # BLD: 0 K/UL (ref 0–0.01)
NRBC BLD-RTO: 0 PER 100 WBC
PLATELET # BLD AUTO: 373 K/UL (ref 150–400)
PMV BLD AUTO: 9.7 FL (ref 8.9–12.9)
POTASSIUM SERPL-SCNC: 3.5 MMOL/L (ref 3.5–5.1)
RBC # BLD AUTO: 4.36 M/UL (ref 4.1–5.7)
RBC MORPH BLD: ABNORMAL
SODIUM SERPL-SCNC: 132 MMOL/L (ref 136–145)
WBC # BLD AUTO: 8.2 K/UL (ref 4.1–11.1)

## 2024-04-17 PROCEDURE — 80048 BASIC METABOLIC PNL TOTAL CA: CPT

## 2024-04-17 PROCEDURE — 36415 COLL VENOUS BLD VENIPUNCTURE: CPT

## 2024-04-17 PROCEDURE — 94760 N-INVAS EAR/PLS OXIMETRY 1: CPT

## 2024-04-17 PROCEDURE — 85025 COMPLETE CBC W/AUTO DIFF WBC: CPT

## 2024-04-17 PROCEDURE — 2580000003 HC RX 258: Performed by: STUDENT IN AN ORGANIZED HEALTH CARE EDUCATION/TRAINING PROGRAM

## 2024-04-17 PROCEDURE — 6370000000 HC RX 637 (ALT 250 FOR IP): Performed by: STUDENT IN AN ORGANIZED HEALTH CARE EDUCATION/TRAINING PROGRAM

## 2024-04-17 PROCEDURE — 2700000000 HC OXYGEN THERAPY PER DAY

## 2024-04-17 PROCEDURE — 6360000002 HC RX W HCPCS: Performed by: STUDENT IN AN ORGANIZED HEALTH CARE EDUCATION/TRAINING PROGRAM

## 2024-04-17 RX ORDER — SPIRONOLACTONE 25 MG/1
25 TABLET ORAL DAILY
Qty: 30 TABLET | Refills: 0 | Status: SHIPPED | OUTPATIENT
Start: 2024-04-18

## 2024-04-17 RX ORDER — SACUBITRIL AND VALSARTAN 49; 51 MG/1; MG/1
1 TABLET, FILM COATED ORAL 2 TIMES DAILY
Qty: 60 TABLET | Refills: 0 | Status: SHIPPED | OUTPATIENT
Start: 2024-04-17

## 2024-04-17 RX ADMIN — EMPAGLIFLOZIN 10 MG: 10 TABLET, FILM COATED ORAL at 08:23

## 2024-04-17 RX ADMIN — CARVEDILOL 3.12 MG: 6.25 TABLET, FILM COATED ORAL at 08:23

## 2024-04-17 RX ADMIN — TORSEMIDE 100 MG: 100 TABLET ORAL at 08:23

## 2024-04-17 RX ADMIN — SODIUM CHLORIDE, PRESERVATIVE FREE 10 ML: 5 INJECTION INTRAVENOUS at 08:24

## 2024-04-17 RX ADMIN — SACUBITRIL AND VALSARTAN 1 TABLET: 49; 51 TABLET, FILM COATED ORAL at 08:23

## 2024-04-17 RX ADMIN — POTASSIUM CHLORIDE 20 MEQ: 750 TABLET, FILM COATED, EXTENDED RELEASE ORAL at 08:23

## 2024-04-17 RX ADMIN — ENOXAPARIN SODIUM 40 MG: 100 INJECTION SUBCUTANEOUS at 08:23

## 2024-04-17 ASSESSMENT — PAIN SCALES - GENERAL: PAINLEVEL_OUTOF10: 0

## 2024-04-17 NOTE — DISCHARGE SUMMARY
Discharge Summary       PATIENT ID: Kermit Paulino  MRN: [id number]   YOB: 1980    DATE OF ADMISSION: 4/11/2024  3:11 AM    DATE OF DISCHARGE: 4/17/24   PRIMARY CARE PROVIDER: Annia Love APRN - NP     ATTENDING PHYSICIAN: Greg Carranza MD  DISCHARGING PROVIDER: Greg Carranza MD    To contact this individual call [phone] and ask the  to page.  If unavailable ask to be transferred the Adult Hospitalist Department.    CONSULTATIONS: IP CONSULT TO HOSPITALIST  IP CONSULT TO CARDIOLOGY  IP CONSULT TO ADVANCED HEART FAILURE NURSE NAVIGATOR  IP CONSULT TO PULMONOLOGY  IP CONSULT TO CASE MANAGEMENT    PROCEDURES/SURGERIES: * No surgery found *    ADMITTING DIAGNOSES & HOSPITAL COURSE:   Kermit Paulino is a 44 y.o. male with history of chronic systolic heart failure (EF 20%), nonischemic cardiomyopathy, chronic hypoxic respiratory failure on 2 L nasal cannula at baseline severe morbid obesity, hypertension, asthma, IV drug abuse, iron deficiency anemia who presented to the emergency department with complaints of shortness of breath and worsening lower extremity swelling.  The patient denies any fever, chills, chest or abdominal pain, nausea, vomiting, cough, congestion, recent illness, palpitations, or dysuria.     Remarkable vitals on ER Presentation: BP to 163/109  Labs Remarkable for: Hemoglobin 8.7, troponin 77, BNP 4930, cr1.34, alb 2.5  ER Images: Chest x-ray showed stable cardiomegaly and no acute abnormality  ER Rx: Lasix 40 mg IV    Acute on chronic systolic congestive heart failure  Chronic hypoxic respiratory failure secondary to CHF  Nonischemic cardiomyopathy  -[facility] 5/10/23 with EF 20%  -Continue diuresis with lasix 80mg iv bid  -Strict I&Os with daily weights  -Continue home supplemental oxygen  -ECHO in AM  -PTA Coreg, Entresto  -Cardiology consult in AM     Hypertension  -Continue Entresto, Coreg, Lasix     Asthma  -DuoNebs as needed     Iron deficiency  anemia  -Patient currently not on iron supplementation  -Advised compliance with home iron therapy     Severe morbid obesity  -Counseled on weight loss, dieting and exercise    DISCHARGE DIAGNOSES / PLAN:      Acute on chronic systolic congestive heart failure NYHA III  Chronic hypoxic respiratory failure secondary to CHF  Nonischemic cardiomyopathy  Baseline 2l NC  -[facility] 5/10/23 with EF 20%  -ECHO shows EF 15-20%  -PTA Coreg, Entresto, Farxiga  -Appreciate Cardiology  -[facility]  -Back to home dose of torsemide  -monitor renal function  -NIV for home being arranged by pulmonology      Hypertension  -Continue Entresto, Coreg, Lasix     Asthma  -DuoNebs as needed     Iron deficiency anemia  -Patient currently not on iron supplementation  -Advised compliance with home iron therapy     Severe morbid obesity: Counseled on weight loss, dieting and exercise     Probable KELSEY: scheduled for outpatient sleep study       PENDING TEST RESULTS:   At the time of discharge the following test results are still pending: none    [facility]    [facility]     ADDITIONAL CARE RECOMMENDATIONS: none    DIET: cardiac diet    ACTIVITY: activity as tolerated    WOUND CARE: none    EQUIPMENT needed: none      DISCHARGE MEDICATIONS:     Medication List        START taking these medications      spironolactone 25 MG tablet  Commonly known as: ALDACTONE  Take 1 tablet by mouth daily  Start taking on: April 18, 2024            CHANGE how you take these medications      Entresto 49-51 MG per tablet  Generic drug: sacubitril-valsartan  Take 1 tablet by mouth 2 times daily  What changed: See the new instructions.            CONTINUE taking these medications      albuterol sulfate  (90 Base) MCG/ACT inhaler  Commonly known as: PROVENTIL;VENTOLIN;PROAIR     carvedilol 3.125 MG tablet  Commonly known as: COREG     Farxiga 5 MG tablet  Generic drug: dapagliflozin     fluticasone 50

## 2024-04-17 NOTE — PROGRESS NOTES
Pulmonary, Critical Care, and Sleep Medicine~Progress Note    Name: Kermit Paulino MRN: [id number]   : 1980 Hospital: [facility]   Date: 2024 11:21 AM Admission: 2024     Impression Plan   Acute on chronic hypoxic respiratory failure in the setting of chronic hypercapnia.  On 2 L at home  Decomp heart failure.  EF 10 to 15% with evidence of pulmonary hypertension  Restrictive lung disease, OHS  Asthma, likely developing obstructive phenotype.  Former smoker 20 years  Asthma  Significant obesity  Hypertension Spirometry, pending  We will set him up with a noninvasive ventilation closer to discharge  We discussed and he was in agreement.  BiPAP including ST function and CPAP were considered and ruled out as long-term effective management for hypercapnia.  AVAPS-AE mode is the preferred choice of ventilation for management of hypercapnia long-term.  Home ventilator unit in this form will help with maintaining lung volumes, pressure support 30 cm of H2O or greater if needed, backup alarms, backup battery.  It will help reduce rehospitalization rates associated to hypercapnia.  Form filled out and given to case management.  O2 titration about 90%  Bronchodilators  Lasix, Jardiance  DVT prophylaxis  Going home  Paperwork filled out     Daily Progression:      Doing well       resting    4/15  Consult Note requested by hospitalist service    Patient presented for admission on 2024 secondary to worsening shortness of breath.  He has a known history of nonischemic cardiomyopathy with an EF of 20%.  On baseline 2 L of oxygen.  He does have a history of smoking and IV drug use.  He states that he stopped smoking around 1.5 years ago and was a smoker for approximately 20 years.  He is on as needed albuterol but no other maintenance medications.  He has a long history of suspected sleep apnea but has never followed up with a sleep specialist despite several referrals.  He is  500 mg Oral TID PRN    sodium chloride flush 0.9 % injection 5-40 mL  5-40 mL IntraVENous 2 times per day    0.9 % sodium chloride infusion   IntraVENous PRN    magnesium sulfate 2000 mg in 50 mL IVPB premix  2,000 mg IntraVENous PRN    ondansetron (ZOFRAN-ODT) disintegrating tablet 4 mg  4 mg Oral Q8H PRN    Or    ondansetron (ZOFRAN) injection 4 mg  4 mg IntraVENous Q6H PRN    polyethylene glycol (GLYCOLAX) packet 17 g  17 g Oral Daily PRN    acetaminophen (TYLENOL) tablet 650 mg  650 mg Oral Q6H PRN    Or    acetaminophen (TYLENOL) suppository 650 mg  650 mg Rectal Q6H PRN    carvedilol (COREG) tablet 3.125 mg  3.125 mg Oral BID    sacubitril-valsartan (ENTRESTO) 49-51 MG per tablet 1 tablet  1 tablet Oral BID    empagliflozin (JARDIANCE) tablet 10 mg  10 mg Oral Daily       Labs:  ABG  Latest Reference Range & Units 04/14/24 16:11 04/15/24 07:22   DEVICE -   NASAL CANNULA NASAL CANNULA   Site -   RIGHT RADIAL LEFT RADIAL   POC pH 7.35 - 7.45   7.37 7.38   POC pCO2 35.0 - 45.0 MMHG 62.1 (H) 63.6 (H)   POC PO2 80 - 100 MMHG 77 (L) 93   POC HCO3 22 - 26 MMOL/L 35.5 (H) 37.3 (H)   POC O2 SAT 92 - 97 % 94.3 96.7   POC Landon's Test -   Positive Positive   FIO2 % 28 2   Base Excess mmol/L 8.2 9.7   (H): Data is abnormally high  (L): Data is abnormally low     CBC Recent Labs     04/15/24  0642 04/16/24  0351 04/17/24  0505   WBC 9.7 8.8 8.2   HGB 8.8* 9.5* 9.4*   HCT 34.7* 34.8* 34.2*   * 396 373   MCV 81.1 77.5* 78.4*   MCH 20.6* 21.2* 21.6*          Metabolic  Panel Recent Labs     04/15/24  0642 04/16/24  0351 04/17/24  0505   * 131* 132*   K 3.3* 3.3* 3.5   CL 92* 88* 88*   CO2 40* 36* 38*   BUN 16 20 24*          Pertinent Labs                Virgilio Parsons PA-C  4/17/2024

## 2024-04-17 NOTE — PROGRESS NOTES
Cardiology Progress Note  2024    Admit Date: 2024  Admit Diagnosis: CHF (NYHA class IV, ACC/AHA stage D) ([facility]) [I50.84]  Acute on chronic systolic congestive heart failure (HCC) [I50.23]  Other hypervolemia [E87.79]  CC:  HF    Assessment/Recommendations:  Acute on chronic HFrEF - NYHA III. H/o NICM with severely reduced LVEF 10-15%.  - followed at [facility] for HF but has not been consistent with follow up  - continue PO torsemide 100 mg BID  - continue coreg, jardiance, spironolactone and entresto  - has been referred for ICD [facility]. Has a NSIVCD with  ms so he may benefit from CRT. Encouraged to follow up at [facility] re this post discharge  - strict I/O and daily weights  - ok for discharge from cardiac standpoint. He is scheduled for follow up at [facility].     Acute on chronic hypoxic and hypercarbic respiratory failure  - pulmonology consulted, recommending NIPPV at home     HTN  - as above     Morbid obesity     Thank you for this consult. We will sign off. Please contact us for any further questions or concerns.    Hospital problem list     Principal Problem:    CHF (NYHA class IV, ACC/AHA stage D) ([facility])  Resolved Problems:    * No resolved hospital problems. *                                                        Subjective:     Weight stable 333 lbs.Denies dyspnea. Noting itching that he thinks maybe related to spironolactone. He wants to hold this morning's dose but wants to take it at discharge.    ROS: All other systems reviewed and were negative other than mentioned above.                                            No change in family and social history from H&P/Consult note.    Objective:    Physical Exam:  24 hr VS reviewed, overall VSSAF  Temp (24hrs), Av.4 °F (36.9 °C), Min:97.6 °F (36.4 °C), Max:98.8 °F (37.1 °C)    Patient Vitals for the past 8 hrs:   Pulse   24 0806 83   24 0600 88   24 0400 94   24 0344 91   24 0200 92    Patient  potassium chloride (KLOR-CON) extended release tablet 20 mEq  20 mEq Oral Daily    torsemide (DEMADEX) tablet 100 mg  100 mg Oral BID    diphenhydrAMINE (BENADRYL) capsule 25 mg  25 mg Oral Q6H PRN    spironolactone (ALDACTONE) tablet 25 mg  25 mg Oral Daily    enoxaparin (LOVENOX) injection 40 mg  40 mg SubCUTAneous BID    ipratropium 0.5 mg-albuterol 2.5 mg (DUONEB) nebulizer solution 1 Dose  1 Dose Inhalation Q4H PRN    calcium carbonate (TUMS) chewable tablet 500 mg  500 mg Oral TID PRN    sodium chloride flush 0.9 % injection 5-40 mL  5-40 mL IntraVENous 2 times per day    0.9 % sodium chloride infusion   IntraVENous PRN    magnesium sulfate 2000 mg in 50 mL IVPB premix  2,000 mg IntraVENous PRN    ondansetron (ZOFRAN-ODT) disintegrating tablet 4 mg  4 mg Oral Q8H PRN    Or    ondansetron (ZOFRAN) injection 4 mg  4 mg IntraVENous Q6H PRN    polyethylene glycol (GLYCOLAX) packet 17 g  17 g Oral Daily PRN    acetaminophen (TYLENOL) tablet 650 mg  650 mg Oral Q6H PRN    Or    acetaminophen (TYLENOL) suppository 650 mg  650 mg Rectal Q6H PRN    carvedilol (COREG) tablet 3.125 mg  3.125 mg Oral BID    sacubitril-valsartan (ENTRESTO) 49-51 MG per tablet 1 tablet  1 tablet Oral BID    empagliflozin (JARDIANCE) tablet 10 mg  10 mg Oral Daily         Aaron Sorto MD  General Cardiology  [facility]  04/17/24

## 2024-04-17 NOTE — PLAN OF CARE
Problem: Safety - Adult  Goal: Free from fall injury  Outcome: Progressing  Flowsheets (Taken 4/17/2024 1103)  Free From Fall Injury: Instruct family/caregiver on patient safety

## 2024-04-18 ENCOUNTER — TELEPHONE (OUTPATIENT)
Facility: HOSPITAL | Age: 44
End: 2024-04-18

## 2024-04-18 NOTE — TELEPHONE ENCOUNTER
HEART FAILURE NURSE NAVIGATOR POST DISCHARGE FOLLOW UP PHONE CALL     HF NN contacted patient by telephone to perform post hospital discharge follow up call.  Verified patient name and date of birth as identifiers.  Provided introduction to self and role.    Reviewed discharge instructions; confirmed patient is in receipt of all prescribed HF medications. Patient states he already has a prescription for spironolactone 25 mg at home, but he will  his Entresto prescription today.    Reinforced importance of daily weights and dietary restrictions, following low sodium diet.      Reinforced signs/symptoms of HF and when to notify the physician.    Confirmed knowledge of scheduled follow up appointment: PCP Annia Love NP 4/19/24 at 1:00 PM; cardiology Jennifer Aguilar NP 4/22/24 at 12:45 PM.  Patient states he will attend both of these appointments.    Confirmed patient has transportation to above appointment.    Patient given opportunity to ask questions/express concerns.  All questions answered with good understanding.

## 2024-04-26 NOTE — PROGRESS NOTES
Physician Progress Note      PATIENT:               OLGA DOCKERY  [facility] #:                  [id number]  :                       1980  ADMIT DATE:       2024 3:11 AM  DISCH DATE:        2024 3:30 PM  RESPONDING  PROVIDER #:        Greg Carranza MD          QUERY TEXT:    Pt admitted with CHF. Pt noted to have abnormal serum CO2 levels. If possible,   please document in the progress notes and discharge summary if you are   evaluating and/or treating any of the following:      The medical record reflects the following:  Risk Factors: CHF  Clinical Indicators:  04/15/24 06:42  CARBON DIOXIDE: 40 (H)    24 03:51  CARBON DIOXIDE: 36 (H)    24 05:05  CARBON DIOXIDE: 38 (H)    Treatment: supplemental oxygen @ 2lts (PTA), lasix IV, lab monitoring    Thank you  Licha Allen RN, CDI, CCDS, CRCR  Certified  Clinical Documentation   O: [phone]  [email or web address]  I can also be reached by perfect serve  Options provided:  -- Metabolic Alkalosis  -- Other - I will add my own diagnosis  -- Disagree - Not applicable / Not valid  -- Disagree - Clinically unable to determine / Unknown  -- Refer to Clinical Documentation Reviewer    PROVIDER RESPONSE TEXT:    This patient has metabolic alkalosis.    Query created by: Licha Allen on 2024 7:21 AM      Electronically signed by:  Greg Carranza MD 2024 8:40 AM

## 2024-06-13 NOTE — PROGRESS NOTES
[address] [facility] Adult  Hospitalist Group                                                                                          Hospitalist Progress Note  Purvi Gannon MD  Answering service: [phone] from in house phone        Date of Service:  2022  NAME:  Tracey Oh  :  1980  MRN:  [id number]      Admission Summary:     A 38year old male patient with PMH of CHF with EF 20%, HTN presented to ED for evaluation of worsening sob. Patient has been non complain with his medications - he only takes torsemide and stopped taking other meds as he feels that he gets amnesia/ memory impairment. He has been noticing worsening sob since 1 month. He was not able to sleep. + PND and orthopnea. He c/o worsening edema of legs and abdominal distension. He does have chronic productive cough. He also states that he has on/off chest pains. He denied fever, abd pain, urinary or bowel changes. In ED, pro BNP elevated , IV lasix 80mg given. Hospitalist consulted for admission  10/25/2022     Interval history / Subjective:     He said he feels better, he had bowel movement last night not dark like before. No left-sided chest pain, difficulty of breathing, palpitation or diaphoresis     Assessment & Plan:     Acute on chronic systolic and diastolic CHF NYHA class IV on poa  Acute hypoxic respiratory failure   - pro BNP 4293  - CT: Mild groundglass opacity which may represent mild pulmonary edema    - echo on 10/27/2022 severely reduced left ventricular systolic function with estimated EF 20 to 25%, left ventricle is moderately dilated, severe septal thickening. Finding consistent with severe concentric hypertrophy.   Severe global hypokinesis present  -Monitor strict I&Os , daily weight   -IV Lasix switched to oral torsemide 100 mg p.o. twice daily, on Coreg, BP improved, resume Entresto, and continue empagliflozin, monitor blood pressure  - hold aldactone   - venous duplex: no DVT - SpO2 84-98% on 2 l/m, monitor pulse ox, wean down oxygen  -Cardiologist on board     Fever on 10/26 possible due to aspiration PNA   - normal lactic, procalcitonin  - resp panel neg  - CTA chest: no PE. Small nodular airspace opacities in the right lower lobe may reflect aspiration or infection   -Completed IV zosyn      ANH possibly prerenal  -Creatinine 1.43 on poa - cr improved to 1.18  -Avoid nephrotoxin   -monitor renal function while patient on diuretics     HTN  -BP improved and normal on coreg, demadex,and Entresto  -midodine is discontinued and monitor blood pressure     Morbid obesity   -Body mass index is 44.48 kg/m². -Recommend weight loss     Possible GI bleed  -hem occult positive on 10/28  -no abdominal pain  -hold lovenox  -Hgb now Hgb 9.6  -consult GI  -GI on board and possible colonoscopy tomorrow     Suspect YONATAN  - sleep study as outpt        Code status: Full code  Prophylaxis: hold Lovenox, on SCD  Care Plan discussed with: Patient, nurse and CM  Anticipated Disposition: Home with home health care service in the next 24 to 48 hours     Hospital Problems  Never Reviewed            Codes Class Noted POA    CHF exacerbation ([facility] [address] ICD-10-CM: I50.9  ICD-9-CM: 428.0  10/25/2022 Unknown       Vital Signs:    Last 24hrs VS reviewed since prior progress note.  Most recent are:  Visit Vitals  BP (!) 147/82 (BP 1 Location: Right lower arm, BP Patient Position: At rest)   Pulse 91   Temp 98.7 °F (37.1 °C)   Resp 18   Ht 5' 10\" (1.778 m)   Wt 148.5 kg (327 lb 6.4 oz)   SpO2 92%   BMI 46.98 kg/m²         Intake/Output Summary (Last 24 hours) at 11/1/2022 1148  Last data filed at 11/1/2022 0920  Gross per 24 hour   Intake 900 ml   Output 3300 ml   Net -2400 ml          Physical Examination:     I had a face to face encounter with this patient and independently examined them on 11/1/2022 as outlined below:          Constitutional:  No acute distress, cooperative, pleasant    ENT:  Oral mucosa moist, oropharynx benign. Resp: Decreased bronchial breath sounds to auscultation bilaterally. No wheezing/rhonchi/rales. No accessory muscle use. CV:  Regular rhythm, normal rate, no murmurs, gallops, rubs    GI:  Soft, non distended, non tender. normoactive bowel sounds, no hepatosplenomegaly     Musculoskeletal: Bilateral pretibial and pedal.    Neurologic: Conscious in the last ,moves all extremities. AAOx3, CN II-XII reviewed            Data Review:    Review and/or order of clinical lab test  Review and/or order of tests in the radiology section of CPT  Review and/or order of tests in the medicine section of [facility]      Labs:     Recent Labs     11/01/22  0502 10/31/22  0530   WBC 7.9 8.2   HGB 8.9* 9.6*   HCT 32.8* 35.7*    332       Recent Labs     11/01/22  0502 10/31/22  0530 10/30/22  0035    138 137   K 3.7 4.2 3.5    99 98   CO2 37* 38* 34*   BUN 12 13 14   CREA 1.18 1.28 1.37*   * 136* 110*   CA 8.0* 8.1* 7.6*       Recent Labs     11/01/22  0502 10/31/22  0530 10/30/22  0035   ALT 14 17 14   AP 37* 36* 38*   TBILI 0.4 0.4 0.3   TP 7.6 8.5* 8.4*   ALB 2.5* 2.6* 2.4*   GLOB 5.1* 5.9* 6.0*       No results for input(s): INR, PTP, APTT, INREXT, INREXT in the last 72 hours. No results for input(s): FE, TIBC, PSAT, FERR in the last 72 hours. No results found for: FOL, RBCF   No results for input(s): PH, PCO2, PO2 in the last 72 hours. No results for input(s): CPK, CKNDX, TROIQ in the last 72 hours.     No lab exists for component: CPKMB  No results found for: CHOL, CHOLX, CHLST, CHOLV, HDL, HDLP, LDL, LDLC, DLDLP, TGLX, TRIGL, TRIGP, CHHD, CHHDX  Lab Results   Component Value Date/Time    Glucose (POC) 99 12/04/2014 12:44 PM     No results found for: COLOR, APPRN, SPGRU, REFSG, CORINNA, PROTU, GLUCU, KETU, BILU, UROU, LALO, LEUKU, GLUKE, EPSU, BACTU, WBCU, RBCU, CASTS, UCRY      Medications Reviewed:     Current Facility-Administered Medications   Medication Dose Route Frequency peg 3350-electrolytes (COLYTE) 4000 mL  2,000 mL Oral BID    torsemide (DEMADEX) tablet 100 mg  100 mg Oral BID    pantoprazole (PROTONIX) tablet 40 mg  40 mg Oral ACB    [Held by provider] enoxaparin (LOVENOX) injection 30 mg  30 mg SubCUTAneous Q12H    sodium chloride (NS) flush 5-40 mL  5-40 mL IntraVENous Q8H    sodium chloride (NS) flush 5-40 mL  5-40 mL IntraVENous PRN    acetaminophen (TYLENOL) tablet 650 mg  650 mg Oral Q6H PRN    Or    acetaminophen (TYLENOL) suppository 650 mg  650 mg Rectal Q6H PRN    polyethylene glycol (MIRALAX) packet 17 g  17 g Oral DAILY PRN    ondansetron (ZOFRAN ODT) tablet 4 mg  4 mg Oral Q8H PRN    Or    ondansetron (ZOFRAN) injection 4 mg  4 mg IntraVENous Q6H PRN    carvediloL (COREG) tablet 3.125 mg  3.125 mg Oral BID    sacubitriL-valsartan (ENTRESTO) 49-51 mg tablet 1 Tablet  1 Tablet Oral Q12H    empagliflozin (JARDIANCE) tablet 10 mg  10 mg Oral DAILY    albuterol-ipratropium (DUO-NEB) 2.5 MG-0.5 MG/3 ML  3 mL Nebulization Q4H PRN     ______________________________________________________________________  EXPECTED LENGTH OF STAY: 3d 19h  ACTUAL LENGTH OF STAY:          7                 Salena Aguilar MD No difficulties

## 2024-09-05 ENCOUNTER — HOSPITAL ENCOUNTER (EMERGENCY)
Facility: HOSPITAL | Age: 44
Discharge: HOME | End: 2024-09-06
Attending: EMERGENCY MEDICINE
Payer: MEDICAID

## 2024-09-05 ENCOUNTER — APPOINTMENT (OUTPATIENT)
Facility: HOSPITAL | Age: 44
End: 2024-09-05
Payer: MEDICAID

## 2024-09-05 DIAGNOSIS — R06.02 SHORTNESS OF BREATH: Primary | ICD-10-CM

## 2024-09-05 DIAGNOSIS — E87.6 HYPOKALEMIA: ICD-10-CM

## 2024-09-05 LAB
ALBUMIN SERPL-MCNC: 2.9 G/DL (ref 3.5–5)
ALBUMIN/GLOB SERPL: 0.5 (ref 1.1–2.2)
ALP SERPL-CCNC: 56 U/L (ref 45–117)
ALT SERPL-CCNC: 12 U/L (ref 12–78)
ANION GAP SERPL CALC-SCNC: 1 MMOL/L (ref 2–12)
AST SERPL-CCNC: 14 U/L (ref 15–37)
BASOPHILS # BLD: 0 K/UL (ref 0–0.1)
BASOPHILS NFR BLD: 0 % (ref 0–1)
BILIRUB SERPL-MCNC: 0.5 MG/DL (ref 0.2–1)
BUN SERPL-MCNC: 12 MG/DL (ref 6–20)
BUN/CREAT SERPL: 11 (ref 12–20)
CALCIUM SERPL-MCNC: 7.4 MG/DL (ref 8.5–10.1)
CHLORIDE SERPL-SCNC: 97 MMOL/L (ref 97–108)
CO2 SERPL-SCNC: 39 MMOL/L (ref 21–32)
COMMENT:: NORMAL
CREAT SERPL-MCNC: 1.05 MG/DL (ref 0.7–1.3)
DIFFERENTIAL METHOD BLD: ABNORMAL
EOSINOPHIL # BLD: 0.2 K/UL (ref 0–0.4)
EOSINOPHIL NFR BLD: 2 % (ref 0–7)
ERYTHROCYTE [DISTWIDTH] IN BLOOD BY AUTOMATED COUNT: 16.9 % (ref 11.5–14.5)
GLOBULIN SER CALC-MCNC: 5.4 G/DL (ref 2–4)
GLUCOSE SERPL-MCNC: 110 MG/DL (ref 65–100)
HCT VFR BLD AUTO: 37.6 % (ref 36.6–50.3)
HGB BLD-MCNC: 11 G/DL (ref 12.1–17)
IMM GRANULOCYTES # BLD AUTO: 0 K/UL (ref 0–0.04)
IMM GRANULOCYTES NFR BLD AUTO: 0 % (ref 0–0.5)
LYMPHOCYTES # BLD: 1.8 K/UL (ref 0.8–3.5)
LYMPHOCYTES NFR BLD: 18 % (ref 12–49)
MCH RBC QN AUTO: 24.7 PG (ref 26–34)
MCHC RBC AUTO-ENTMCNC: 29.3 G/DL (ref 30–36.5)
MCV RBC AUTO: 84.3 FL (ref 80–99)
MONOCYTES # BLD: 0.8 K/UL (ref 0–1)
MONOCYTES NFR BLD: 8 % (ref 5–13)
NEUTS SEG # BLD: 7.4 K/UL (ref 1.8–8)
NEUTS SEG NFR BLD: 72 % (ref 32–75)
NRBC # BLD: 0 K/UL (ref 0–0.01)
NRBC BLD-RTO: 0 PER 100 WBC
NT PRO BNP: 372 PG/ML
PLATELET # BLD AUTO: 325 K/UL (ref 150–400)
PMV BLD AUTO: 10.8 FL (ref 8.9–12.9)
POTASSIUM SERPL-SCNC: 3.1 MMOL/L (ref 3.5–5.1)
PROT SERPL-MCNC: 8.3 G/DL (ref 6.4–8.2)
RBC # BLD AUTO: 4.46 M/UL (ref 4.1–5.7)
SODIUM SERPL-SCNC: 137 MMOL/L (ref 136–145)
SPECIMEN HOLD: NORMAL
TROPONIN I SERPL HS-MCNC: 45 NG/L (ref 0–76)
TROPONIN I SERPL HS-MCNC: 50 NG/L (ref 0–76)
WBC # BLD AUTO: 10.3 K/UL (ref 4.1–11.1)

## 2024-09-05 PROCEDURE — 80053 COMPREHEN METABOLIC PANEL: CPT

## 2024-09-05 PROCEDURE — 99285 EMERGENCY DEPT VISIT HI MDM: CPT

## 2024-09-05 PROCEDURE — 71045 X-RAY EXAM CHEST 1 VIEW: CPT

## 2024-09-05 PROCEDURE — 85025 COMPLETE CBC W/AUTO DIFF WBC: CPT

## 2024-09-05 PROCEDURE — 36415 COLL VENOUS BLD VENIPUNCTURE: CPT

## 2024-09-05 PROCEDURE — 84484 ASSAY OF TROPONIN QUANT: CPT

## 2024-09-05 PROCEDURE — 93005 ELECTROCARDIOGRAM TRACING: CPT | Performed by: EMERGENCY MEDICINE

## 2024-09-05 PROCEDURE — 6370000000 HC RX 637 (ALT 250 FOR IP): Performed by: STUDENT IN AN ORGANIZED HEALTH CARE EDUCATION/TRAINING PROGRAM

## 2024-09-05 PROCEDURE — 83880 ASSAY OF NATRIURETIC PEPTIDE: CPT

## 2024-09-05 RX ORDER — POTASSIUM CHLORIDE 750 MG/1
40 TABLET, EXTENDED RELEASE ORAL ONCE
Status: COMPLETED | OUTPATIENT
Start: 2024-09-05 | End: 2024-09-05

## 2024-09-05 RX ADMIN — POTASSIUM CHLORIDE 40 MEQ: 750 TABLET, FILM COATED, EXTENDED RELEASE ORAL at 22:38

## 2024-09-05 ASSESSMENT — ENCOUNTER SYMPTOMS
RHINORRHEA: 0
NAUSEA: 0
DIARRHEA: 0
ABDOMINAL PAIN: 0
SORE THROAT: 0
EYE PAIN: 0
SHORTNESS OF BREATH: 0
COLOR CHANGE: 0
COUGH: 0
BACK PAIN: 0
VOMITING: 0

## 2024-09-05 ASSESSMENT — PAIN - FUNCTIONAL ASSESSMENT: PAIN_FUNCTIONAL_ASSESSMENT: NONE - DENIES PAIN

## 2024-09-05 NOTE — ED PROVIDER NOTES
MRI are read by the radiologist. Plain radiographic images are visualized and preliminarily interpreted by the emergency physician with the below findings:        Interpretation per the Radiologist below, if available at the time of this note:    XR CHEST PORTABLE   Final Result   No acute intrathoracic process is identified.             Electronically signed by DHARMESH NOLASCO           LABS:  Labs Reviewed   CBC WITH AUTO DIFFERENTIAL - Abnormal; Notable for the following components:       Result Value    Hemoglobin 11.0 (*)     MCH 24.7 (*)     MCHC 29.3 (*)     RDW 16.9 (*)     All other components within normal limits   COMPREHENSIVE METABOLIC PANEL - Abnormal; Notable for the following components:    Potassium 3.1 (*)     CO2 39 (*)     Anion Gap 1 (*)     Glucose 110 (*)     BUN/Creatinine Ratio 11 (*)     Calcium 7.4 (*)     AST 14 (*)     Total Protein 8.3 (*)     Albumin 2.9 (*)     Globulin 5.4 (*)     Albumin/Globulin Ratio 0.5 (*)     All other components within normal limits   BRAIN NATRIURETIC PEPTIDE - Abnormal; Notable for the following components:    NT Pro- (*)     All other components within normal limits   EXTRA TUBES HOLD   TROPONIN   TROPONIN       All other labs were within normal range or not returned as of this dictation.    EMERGENCY DEPARTMENT COURSE and DIFFERENTIAL DIAGNOSIS/MDM:   Vitals:    Vitals:    09/05/24 2200 09/05/24 2245 09/06/24 0000 09/06/24 0100   BP: 96/81 (!) 115/90     Pulse: 73 62 61 64   Resp: 22 15 15 16   Temp:       TempSrc:       SpO2: 96% 95% 97% 96%   Weight:               Medical Decision Making  Chest pain the patient longstanding congestive heart failure.  Otherwise stable in the ER.  Will do labs and chest x-ray today.    Patient awaiting completion of labs and case is turned over to Dr. Madison at 2100.  Discharge planning per Dr. Madison.    Amount and/or Complexity of Data Reviewed  Labs: ordered. Decision-making details documented in ED  Course.  Radiology: ordered.  ECG/medicine tests: ordered.    Risk  Prescription drug management.            REASSESSMENT     ED Course as of 09/07/24 1047   Thu Sep 05, 2024   2050 EG at 1915 read at 1922 shows sinus rhythm at 64 bpm with occasional fusion complexes and LVH. [VM]   2114 Potassium(!): 3.1 [LT]   Fri Sep 06, 2024   0113 Patient asymptomatic on reassessment.  Reports he has been having panic attacks, but is typically able to control the symptoms through deep breathing and coping skills.  Reports that he currently is taking approximately 100 mg of Lasix twice daily and that he was warned that this may cause low potassium levels and changes in sensation or muscle cramping.  Reports he experiences symptoms prior to arrival but that they have fully resolved at this time.  Denies any ongoing chest pain or shortness of breath.  Satting 95% on home 1 L/min nasal cannula.  States that he typically wears 1 to 2 L/min nasal cannula at baseline.  Patient requesting something to drink and discharge home at this time.  Return precautions provided. [LT]      ED Course User Index  [LT] Donna Madison MD  [VM] Cesar Kidd MD           CONSULTS:  None    PROCEDURES:  Unless otherwise noted below, none     Procedures      FINAL IMPRESSION      1. Shortness of breath    2. Hypokalemia          DISPOSITION/PLAN   DISPOSITION Decision To Discharge 09/06/2024 01:11:33 AM      PATIENT REFERRED TO:  Annia Love APRN - NP  [address] [facility] [address] [phone]    Schedule an appointment as soon as possible for a visit       [facility] EMERGENCY DEP  [address] [place] [id number] [phone]    As needed, If symptoms worsen      DISCHARGE MEDICATIONS:  Discharge Medication List as of 9/6/2024  1:21 AM            (Please note that portions of this note were completed with a voice recognition program.  Efforts were made to edit the dictations but occasionally words are

## 2024-09-05 NOTE — ED TRIAGE NOTES
Pt arrives via EMS from home for SOB and CP. Pt reports numbness in hands and feet. Pt states hx of same with hypokalemia. Pt arrives 100% on 4l nasal cannula at baseline. Pt given ASA 324mg in route.

## 2024-09-06 VITALS
OXYGEN SATURATION: 96 % | DIASTOLIC BLOOD PRESSURE: 90 MMHG | BODY MASS INDEX: 48.59 KG/M2 | WEIGHT: 315 LBS | HEART RATE: 64 BPM | RESPIRATION RATE: 16 BRPM | SYSTOLIC BLOOD PRESSURE: 115 MMHG | TEMPERATURE: 97.8 F

## 2024-09-06 LAB
EKG ATRIAL RATE: 64 BPM
EKG DIAGNOSIS: NORMAL
EKG P AXIS: 9 DEGREES
EKG P-R INTERVAL: 196 MS
EKG Q-T INTERVAL: 494 MS
EKG QRS DURATION: 140 MS
EKG QTC CALCULATION (BAZETT): 509 MS
EKG R AXIS: -49 DEGREES
EKG T AXIS: 126 DEGREES
EKG VENTRICULAR RATE: 64 BPM

## 2024-09-06 NOTE — ED NOTES
ED SIGN OUT NOTE  Care assumed at [facility] 9:05 PM EDT    Patient was signed out to me by Dr. Kidd.     Patient is awaiting labs and reassessment.    BP (!) 96/56   Pulse 61   Temp 98 °F (36.7 °C) (Temporal)   Resp 22   Wt (!) 153.6 kg (338 lb 10 oz)   SpO2 100%   BMI 48.59 kg/m²     Labs Reviewed   CBC WITH AUTO DIFFERENTIAL - Abnormal; Notable for the following components:       Result Value    Hemoglobin 11.0 (*)     MCH 24.7 (*)     MCHC 29.3 (*)     RDW 16.9 (*)     All other components within normal limits   COMPREHENSIVE METABOLIC PANEL - Abnormal; Notable for the following components:    Potassium 3.1 (*)     CO2 39 (*)     Anion Gap 1 (*)     Glucose 110 (*)     BUN/Creatinine Ratio 11 (*)     Calcium 7.4 (*)     AST 14 (*)     Total Protein 8.3 (*)     Albumin 2.9 (*)     Globulin 5.4 (*)     Albumin/Globulin Ratio 0.5 (*)     All other components within normal limits   BRAIN NATRIURETIC PEPTIDE - Abnormal; Notable for the following components:    NT Pro- (*)     All other components within normal limits   EXTRA TUBES HOLD   TROPONIN     XR CHEST PORTABLE   Final Result   No acute intrathoracic process is identified.             Electronically signed by DHARMESH NOLASCO          ED Course as of 09/05/24 2105   Thu Sep 05, 2024   2050 EG at 1915 read at 1922 shows sinus rhythm at 64 bpm with occasional fusion complexes and LVH. [VM]      ED Course User Index  [VM] Cesar Kidd MD       Diagnosis:   No diagnosis found.    Disposition:        Plan:   ***    Donna Madison MD

## 2024-09-06 NOTE — DISCHARGE INSTRUCTIONS
As we discussed, you should follow-up with your primary care physician and cardiologist for further evaluation management of your symptoms and for recheck of your potassium level. Please return to the emergency department if you experience chest pain, shortness of breath, difficulty breathing, lightheadedness, dizziness, persistent vomiting with concerns for dehydration, or any other new and concerning symptom.

## 2025-01-12 ENCOUNTER — APPOINTMENT (OUTPATIENT)
Facility: HOSPITAL | Age: 45
End: 2025-01-12
Payer: MEDICAID

## 2025-01-12 ENCOUNTER — HOSPITAL ENCOUNTER (EMERGENCY)
Facility: HOSPITAL | Age: 45
Discharge: HOME | End: 2025-01-12
Attending: STUDENT IN AN ORGANIZED HEALTH CARE EDUCATION/TRAINING PROGRAM
Payer: MEDICAID

## 2025-01-12 VITALS
OXYGEN SATURATION: 98 % | RESPIRATION RATE: 18 BRPM | DIASTOLIC BLOOD PRESSURE: 84 MMHG | HEART RATE: 95 BPM | SYSTOLIC BLOOD PRESSURE: 152 MMHG | TEMPERATURE: 97.9 F

## 2025-01-12 DIAGNOSIS — S89.92XA INJURY OF LEFT KNEE, INITIAL ENCOUNTER: Primary | ICD-10-CM

## 2025-01-12 PROCEDURE — 73562 X-RAY EXAM OF KNEE 3: CPT

## 2025-01-12 PROCEDURE — 99283 EMERGENCY DEPT VISIT LOW MDM: CPT

## 2025-01-12 PROCEDURE — 6370000000 HC RX 637 (ALT 250 FOR IP): Performed by: STUDENT IN AN ORGANIZED HEALTH CARE EDUCATION/TRAINING PROGRAM

## 2025-01-12 RX ORDER — ACETAMINOPHEN 500 MG
1000 TABLET ORAL
Status: COMPLETED | OUTPATIENT
Start: 2025-01-12 | End: 2025-01-12

## 2025-01-12 RX ADMIN — ACETAMINOPHEN 1000 MG: 500 TABLET ORAL at 03:30

## 2025-01-12 ASSESSMENT — PAIN DESCRIPTION - ORIENTATION: ORIENTATION: LEFT

## 2025-01-12 ASSESSMENT — PAIN DESCRIPTION - LOCATION: LOCATION: KNEE

## 2025-01-12 ASSESSMENT — PAIN DESCRIPTION - PAIN TYPE: TYPE: ACUTE PAIN

## 2025-01-12 ASSESSMENT — PAIN DESCRIPTION - ONSET: ONSET: ON-GOING

## 2025-01-12 ASSESSMENT — PAIN SCALES - GENERAL: PAINLEVEL_OUTOF10: 10

## 2025-01-12 ASSESSMENT — PAIN DESCRIPTION - FREQUENCY: FREQUENCY: CONTINUOUS

## 2025-01-12 ASSESSMENT — PAIN - FUNCTIONAL ASSESSMENT
PAIN_FUNCTIONAL_ASSESSMENT: 0-10
PAIN_FUNCTIONAL_ASSESSMENT: ACTIVITIES ARE NOT PREVENTED

## 2025-01-12 ASSESSMENT — PAIN DESCRIPTION - DESCRIPTORS: DESCRIPTORS: SHARP

## 2025-01-12 NOTE — DISCHARGE INSTRUCTIONS
You presented to the ED with left knee pain after an injury.  X-rays show no acute fracture.  Ligamentous or meniscal injury is possible.  Knee immobilizer provided as well as crutches.  Take Tylenol 1000 mg every 6 hours.    Information for orthopedic surgery provided.  You should get a call on Monday but if not that information for the doctors phone numbers in your discharge paperwork is all.

## 2025-01-12 NOTE — ED TRIAGE NOTES
Pt comes in from home with CC of left knee pain. Pt reports he fell and feels like something \"popped\".

## 2025-01-14 NOTE — ED PROVIDER NOTES
radiology and laboratory results have been reviewed with patient and/or available family.  Patient and/or family verbally conveyed their understanding and agreement of the patient's signs, symptoms, diagnosis, treatment and prognosis and additionally agree to follow-up as recommended in the discharge instructions or to return to the Emergency Department should their condition change or worsen prior to their follow-up appointment.  All questions have been answered and patient and/or available family express understanding.      Prescriptions provided to the patient:     Discharge Medication List as of 1/12/2025  4:41 AM           Shashi Westbrook MD   Emergency Medicine Attending Physician Shashi Hadley MD  01/13/25 2056

## 2025-02-11 ENCOUNTER — HOSPITAL ENCOUNTER (EMERGENCY)
Facility: HOSPITAL | Age: 45
Discharge: HOME | End: 2025-02-11
Attending: STUDENT IN AN ORGANIZED HEALTH CARE EDUCATION/TRAINING PROGRAM
Payer: MEDICAID

## 2025-02-11 VITALS
TEMPERATURE: 98 F | BODY MASS INDEX: 44.1 KG/M2 | RESPIRATION RATE: 15 BRPM | WEIGHT: 315 LBS | SYSTOLIC BLOOD PRESSURE: 141 MMHG | HEIGHT: 71 IN | OXYGEN SATURATION: 99 % | HEART RATE: 90 BPM | DIASTOLIC BLOOD PRESSURE: 105 MMHG

## 2025-02-11 DIAGNOSIS — S89.92XD INJURY OF LEFT KNEE, SUBSEQUENT ENCOUNTER: Primary | ICD-10-CM

## 2025-02-11 PROCEDURE — 6370000000 HC RX 637 (ALT 250 FOR IP): Performed by: STUDENT IN AN ORGANIZED HEALTH CARE EDUCATION/TRAINING PROGRAM

## 2025-02-11 PROCEDURE — 99284 EMERGENCY DEPT VISIT MOD MDM: CPT

## 2025-02-11 RX ORDER — OXYCODONE HYDROCHLORIDE 5 MG/1
10 TABLET ORAL
Status: COMPLETED | OUTPATIENT
Start: 2025-02-11 | End: 2025-02-11

## 2025-02-11 RX ORDER — OXYCODONE HYDROCHLORIDE 10 MG/1
10 TABLET ORAL EVERY 6 HOURS PRN
Qty: 10 TABLET | Refills: 0 | Status: SHIPPED | OUTPATIENT
Start: 2025-02-11 | End: 2025-03-13

## 2025-02-11 RX ORDER — LIDOCAINE 4 G/G
1 PATCH TOPICAL DAILY
Qty: 30 PATCH | Refills: 0 | Status: SHIPPED | OUTPATIENT
Start: 2025-02-11 | End: 2025-03-13

## 2025-02-11 RX ORDER — LIDOCAINE 4 G/G
1 PATCH TOPICAL
Status: DISCONTINUED | OUTPATIENT
Start: 2025-02-11 | End: 2025-02-11 | Stop reason: HOSPADM

## 2025-02-11 RX ADMIN — OXYCODONE 10 MG: 5 TABLET ORAL at 19:38

## 2025-02-11 ASSESSMENT — PAIN - FUNCTIONAL ASSESSMENT
PAIN_FUNCTIONAL_ASSESSMENT: PREVENTS OR INTERFERES SOME ACTIVE ACTIVITIES AND ADLS
PAIN_FUNCTIONAL_ASSESSMENT: ACTIVITIES ARE NOT PREVENTED

## 2025-02-11 ASSESSMENT — LIFESTYLE VARIABLES
HOW MANY STANDARD DRINKS CONTAINING ALCOHOL DO YOU HAVE ON A TYPICAL DAY: 1 OR 2
HOW OFTEN DO YOU HAVE A DRINK CONTAINING ALCOHOL: MONTHLY OR LESS

## 2025-02-11 ASSESSMENT — PAIN DESCRIPTION - ORIENTATION
ORIENTATION: LEFT
ORIENTATION: LEFT

## 2025-02-11 ASSESSMENT — PAIN SCALES - GENERAL
PAINLEVEL_OUTOF10: 10
PAINLEVEL_OUTOF10: 9

## 2025-02-11 ASSESSMENT — PAIN DESCRIPTION - LOCATION
LOCATION: KNEE
LOCATION: LEG

## 2025-02-11 ASSESSMENT — PAIN DESCRIPTION - DESCRIPTORS
DESCRIPTORS: SHARP
DESCRIPTORS: ACHING

## 2025-02-12 NOTE — ED NOTES
Patient discharged by provider, Dr Welch.   Patient ambulatory with steady gait to ED lobby with family.

## 2025-02-12 NOTE — ED NOTES
Bedside report given to BENJI Valentin by BENJI Lentz. Nurse was informed of reason for arrival, vitals, labs, medications, orders, procedures, results, cardiac rhythm, any outstanding and pending orders and plan of care. Opportunity for questions were provided for receiving RN at this time.

## 2025-02-20 NOTE — ED PROVIDER NOTES
[facility] EMERGENCY DEPARTMENT  EMERGENCY DEPARTMENT ENCOUNTER       Pt Name: Kermit Paulino  MRN: [id number]  Birthdate 1980  Date of evaluation: 2/11/2025  Provider: Nickolas Welch MD   PCP: Annia Love APRN - NP  Note Started: 8:51 PM EST 2/19/25     CHIEF COMPLAINT       Chief Complaint   Patient presents with    Leg Pain     Pt ambulatory with cc of ongoing left leg pain s/p a fall 1 mo ago. Pt states went to [facility] was was evaluated and given knee immobilizer which helped, but does not fit appropriately and slides down. [facility] states did scans but unable to see injury d/t swelling. States followed up with ortho and they want MRI. Pt came today bc pain is getting too unbearable. Pt wears 2LNC baseline for CHF.         HISTORY OF PRESENT ILLNESS: 1 or more elements      History From: Patient  HPI Limitations: None     Kermit Paulino is a 45 y.o. male who presents for left knee pain.  Patient states he had a fall about 3 to 4 weeks ago.  He states he was seen in [facility], had negative x-rays and was fitted with a knee immobilizer.  He states he was told he had a knee effusion.  He has not yet seen orthopedics.  He states he does need to follow-up to get an MRI but has not done so yet.  He reports severe pain in the left knee and states he has had little relief with over-the-counter medication he was prescribed.  He states he cannot take NSAIDs because of his heart failure and blood pressure medications.  Denies any new injury or fall.  Denies any new symptoms.  Denies swelling, bruising, redness, wounds, fever, chills.     Nursing Notes were all reviewed and agreed with or any disagreements were addressed in the HPI.     REVIEW OF SYSTEMS      Review of Systems     Positives and Pertinent negatives as per HPI.    PAST HISTORY     Past Medical History:  Past Medical History:   Diagnosis Date    Asthma     CHF (congestive heart failure) (HCC)     Gastrointestinal disorder     ulcers

## 2025-04-25 ENCOUNTER — HOSPITAL ENCOUNTER (OUTPATIENT)
Facility: HOSPITAL | Age: 45
Setting detail: OBSERVATION
Discharge: HOME | End: 2025-04-27
Attending: STUDENT IN AN ORGANIZED HEALTH CARE EDUCATION/TRAINING PROGRAM | Admitting: FAMILY MEDICINE
Payer: MEDICAID

## 2025-04-25 DIAGNOSIS — R07.9 CHEST PAIN, UNSPECIFIED TYPE: Primary | ICD-10-CM

## 2025-04-25 DIAGNOSIS — Z95.0 PACEMAKER: ICD-10-CM

## 2025-04-25 LAB
COMMENT:: NORMAL
SPECIMEN HOLD: NORMAL

## 2025-04-25 PROCEDURE — 84443 ASSAY THYROID STIM HORMONE: CPT

## 2025-04-25 PROCEDURE — 85025 COMPLETE CBC W/AUTO DIFF WBC: CPT

## 2025-04-25 PROCEDURE — 80053 COMPREHEN METABOLIC PANEL: CPT

## 2025-04-25 PROCEDURE — 99285 EMERGENCY DEPT VISIT HI MDM: CPT

## 2025-04-25 PROCEDURE — 83735 ASSAY OF MAGNESIUM: CPT

## 2025-04-25 PROCEDURE — 93005 ELECTROCARDIOGRAM TRACING: CPT | Performed by: STUDENT IN AN ORGANIZED HEALTH CARE EDUCATION/TRAINING PROGRAM

## 2025-04-25 PROCEDURE — 84484 ASSAY OF TROPONIN QUANT: CPT

## 2025-04-25 ASSESSMENT — LIFESTYLE VARIABLES
HOW MANY STANDARD DRINKS CONTAINING ALCOHOL DO YOU HAVE ON A TYPICAL DAY: PATIENT DOES NOT DRINK
HOW OFTEN DO YOU HAVE A DRINK CONTAINING ALCOHOL: NEVER

## 2025-04-25 ASSESSMENT — PAIN - FUNCTIONAL ASSESSMENT
PAIN_FUNCTIONAL_ASSESSMENT: ACTIVITIES ARE NOT PREVENTED
PAIN_FUNCTIONAL_ASSESSMENT: 0-10

## 2025-04-25 ASSESSMENT — PAIN DESCRIPTION - PAIN TYPE: TYPE: ACUTE PAIN

## 2025-04-25 ASSESSMENT — PAIN SCALES - GENERAL: PAINLEVEL_OUTOF10: 6

## 2025-04-25 ASSESSMENT — PAIN DESCRIPTION - LOCATION: LOCATION: CHEST;SHOULDER;OTHER (COMMENT)

## 2025-04-25 ASSESSMENT — PAIN DESCRIPTION - ONSET: ONSET: ON-GOING

## 2025-04-25 ASSESSMENT — PAIN DESCRIPTION - FREQUENCY: FREQUENCY: CONTINUOUS

## 2025-04-25 ASSESSMENT — PAIN DESCRIPTION - ORIENTATION: ORIENTATION: LEFT

## 2025-04-26 ENCOUNTER — APPOINTMENT (OUTPATIENT)
Facility: HOSPITAL | Age: 45
End: 2025-04-26
Payer: MEDICAID

## 2025-04-26 ENCOUNTER — APPOINTMENT (OUTPATIENT)
Facility: HOSPITAL | Age: 45
End: 2025-04-26
Attending: FAMILY MEDICINE
Payer: MEDICAID

## 2025-04-26 LAB
ALBUMIN SERPL-MCNC: 3 G/DL (ref 3.5–5)
ALBUMIN/GLOB SERPL: 0.5 (ref 1.1–2.2)
ALP SERPL-CCNC: 58 U/L (ref 45–117)
ALT SERPL-CCNC: 13 U/L (ref 12–78)
ANION GAP SERPL CALC-SCNC: 4 MMOL/L (ref 2–12)
AST SERPL-CCNC: 16 U/L (ref 15–37)
BASOPHILS # BLD: 0.08 K/UL (ref 0–0.1)
BASOPHILS NFR BLD: 1 % (ref 0–1)
BILIRUB SERPL-MCNC: 0.3 MG/DL (ref 0.2–1)
BUN SERPL-MCNC: 10 MG/DL (ref 6–20)
BUN/CREAT SERPL: 11 (ref 12–20)
CALCIUM SERPL-MCNC: 8.6 MG/DL (ref 8.5–10.1)
CHLORIDE SERPL-SCNC: 102 MMOL/L (ref 97–108)
CO2 SERPL-SCNC: 29 MMOL/L (ref 21–32)
CREAT SERPL-MCNC: 0.89 MG/DL (ref 0.7–1.3)
DIFFERENTIAL METHOD BLD: ABNORMAL
EKG ATRIAL RATE: 74 BPM
EKG DIAGNOSIS: NORMAL
EKG P AXIS: 63 DEGREES
EKG P-R INTERVAL: 212 MS
EKG Q-T INTERVAL: 426 MS
EKG QRS DURATION: 142 MS
EKG QTC CALCULATION (BAZETT): 472 MS
EKG R AXIS: 211 DEGREES
EKG T AXIS: 49 DEGREES
EKG VENTRICULAR RATE: 74 BPM
EOSINOPHIL # BLD: 0.25 K/UL (ref 0–0.4)
EOSINOPHIL NFR BLD: 3 % (ref 0–7)
ERYTHROCYTE [DISTWIDTH] IN BLOOD BY AUTOMATED COUNT: 15.5 % (ref 11.5–14.5)
GLOBULIN SER CALC-MCNC: 5.5 G/DL (ref 2–4)
GLUCOSE SERPL-MCNC: 184 MG/DL (ref 65–100)
HCT VFR BLD AUTO: 39.4 % (ref 36.6–50.3)
HGB BLD-MCNC: 11.9 G/DL (ref 12.1–17)
IMM GRANULOCYTES # BLD AUTO: 0 K/UL
IMM GRANULOCYTES NFR BLD AUTO: 0 %
LYMPHOCYTES # BLD: 1.6 K/UL (ref 0.8–3.5)
LYMPHOCYTES NFR BLD: 19 % (ref 12–49)
MAGNESIUM SERPL-MCNC: 2 MG/DL (ref 1.6–2.4)
MCH RBC QN AUTO: 27.2 PG (ref 26–34)
MCHC RBC AUTO-ENTMCNC: 30.2 G/DL (ref 30–36.5)
MCV RBC AUTO: 90.2 FL (ref 80–99)
MONOCYTES # BLD: 0.84 K/UL (ref 0–1)
MONOCYTES NFR BLD: 10 % (ref 5–13)
NEUTS BAND NFR BLD MANUAL: 1 % (ref 0–6)
NEUTS SEG # BLD: 5.63 K/UL (ref 1.8–8)
NEUTS SEG NFR BLD: 66 % (ref 32–75)
NRBC # BLD: 0 K/UL (ref 0–0.01)
NRBC BLD-RTO: 0 PER 100 WBC
NT PRO BNP: 570 PG/ML
PLATELET # BLD AUTO: 218 K/UL (ref 150–400)
PMV BLD AUTO: 10.6 FL (ref 8.9–12.9)
POTASSIUM SERPL-SCNC: 3.9 MMOL/L (ref 3.5–5.1)
PROT SERPL-MCNC: 8.5 G/DL (ref 6.4–8.2)
RBC # BLD AUTO: 4.37 M/UL (ref 4.1–5.7)
RBC MORPH BLD: ABNORMAL
SODIUM SERPL-SCNC: 135 MMOL/L (ref 136–145)
TROPONIN I SERPL HS-MCNC: 53 NG/L (ref 0–76)
TROPONIN I SERPL HS-MCNC: 53 NG/L (ref 0–76)
TSH SERPL DL<=0.05 MIU/L-ACNC: 0.96 UIU/ML (ref 0.36–3.74)
WBC # BLD AUTO: 8.4 K/UL (ref 4.1–11.1)

## 2025-04-26 PROCEDURE — G0378 HOSPITAL OBSERVATION PER HR: HCPCS

## 2025-04-26 PROCEDURE — 6370000000 HC RX 637 (ALT 250 FOR IP): Performed by: FAMILY MEDICINE

## 2025-04-26 PROCEDURE — 93971 EXTREMITY STUDY: CPT

## 2025-04-26 PROCEDURE — 2500000003 HC RX 250 WO HCPCS: Performed by: FAMILY MEDICINE

## 2025-04-26 PROCEDURE — 6360000002 HC RX W HCPCS: Performed by: FAMILY MEDICINE

## 2025-04-26 PROCEDURE — 71045 X-RAY EXAM CHEST 1 VIEW: CPT

## 2025-04-26 PROCEDURE — 94640 AIRWAY INHALATION TREATMENT: CPT

## 2025-04-26 PROCEDURE — 6370000000 HC RX 637 (ALT 250 FOR IP): Performed by: HOSPITALIST

## 2025-04-26 PROCEDURE — 94660 CPAP INITIATION&MGMT: CPT

## 2025-04-26 PROCEDURE — 84484 ASSAY OF TROPONIN QUANT: CPT

## 2025-04-26 PROCEDURE — 94760 N-INVAS EAR/PLS OXIMETRY 1: CPT

## 2025-04-26 PROCEDURE — 2700000000 HC OXYGEN THERAPY PER DAY

## 2025-04-26 PROCEDURE — 83880 ASSAY OF NATRIURETIC PEPTIDE: CPT

## 2025-04-26 RX ORDER — SODIUM CHLORIDE 9 MG/ML
INJECTION, SOLUTION INTRAVENOUS PRN
Status: DISCONTINUED | OUTPATIENT
Start: 2025-04-26 | End: 2025-04-27 | Stop reason: HOSPADM

## 2025-04-26 RX ORDER — ONDANSETRON 4 MG/1
4 TABLET, ORALLY DISINTEGRATING ORAL EVERY 8 HOURS PRN
Status: DISCONTINUED | OUTPATIENT
Start: 2025-04-26 | End: 2025-04-27 | Stop reason: HOSPADM

## 2025-04-26 RX ORDER — ONDANSETRON 2 MG/ML
4 INJECTION INTRAMUSCULAR; INTRAVENOUS EVERY 6 HOURS PRN
Status: DISCONTINUED | OUTPATIENT
Start: 2025-04-26 | End: 2025-04-27 | Stop reason: HOSPADM

## 2025-04-26 RX ORDER — TORSEMIDE 100 MG/1
100 TABLET ORAL 2 TIMES DAILY
Status: DISCONTINUED | OUTPATIENT
Start: 2025-04-26 | End: 2025-04-27 | Stop reason: HOSPADM

## 2025-04-26 RX ORDER — ACETAMINOPHEN 325 MG/1
650 TABLET ORAL EVERY 6 HOURS PRN
Status: DISCONTINUED | OUTPATIENT
Start: 2025-04-26 | End: 2025-04-27 | Stop reason: HOSPADM

## 2025-04-26 RX ORDER — SODIUM CHLORIDE 0.9 % (FLUSH) 0.9 %
5-40 SYRINGE (ML) INJECTION PRN
Status: DISCONTINUED | OUTPATIENT
Start: 2025-04-26 | End: 2025-04-27 | Stop reason: HOSPADM

## 2025-04-26 RX ORDER — ACETAMINOPHEN 650 MG/1
650 SUPPOSITORY RECTAL EVERY 6 HOURS PRN
Status: DISCONTINUED | OUTPATIENT
Start: 2025-04-26 | End: 2025-04-27 | Stop reason: HOSPADM

## 2025-04-26 RX ORDER — SPIRONOLACTONE 25 MG/1
25 TABLET ORAL DAILY
Status: DISCONTINUED | OUTPATIENT
Start: 2025-04-26 | End: 2025-04-27 | Stop reason: HOSPADM

## 2025-04-26 RX ORDER — LIDOCAINE 40 MG/G
CREAM TOPICAL PRN
Status: DISCONTINUED | OUTPATIENT
Start: 2025-04-26 | End: 2025-04-27 | Stop reason: HOSPADM

## 2025-04-26 RX ORDER — ENOXAPARIN SODIUM 100 MG/ML
40 INJECTION SUBCUTANEOUS 2 TIMES DAILY
Status: DISCONTINUED | OUTPATIENT
Start: 2025-04-27 | End: 2025-04-27

## 2025-04-26 RX ORDER — POLYETHYLENE GLYCOL 3350 17 G/17G
17 POWDER, FOR SOLUTION ORAL DAILY PRN
Status: DISCONTINUED | OUTPATIENT
Start: 2025-04-26 | End: 2025-04-27 | Stop reason: HOSPADM

## 2025-04-26 RX ORDER — SODIUM CHLORIDE 0.9 % (FLUSH) 0.9 %
5-40 SYRINGE (ML) INJECTION EVERY 12 HOURS SCHEDULED
Status: DISCONTINUED | OUTPATIENT
Start: 2025-04-26 | End: 2025-04-27 | Stop reason: HOSPADM

## 2025-04-26 RX ORDER — IPRATROPIUM BROMIDE AND ALBUTEROL SULFATE 2.5; .5 MG/3ML; MG/3ML
1 SOLUTION RESPIRATORY (INHALATION)
Status: DISCONTINUED | OUTPATIENT
Start: 2025-04-26 | End: 2025-04-27 | Stop reason: HOSPADM

## 2025-04-26 RX ADMIN — ARFORMOTEROL TARTRATE: 15 SOLUTION RESPIRATORY (INHALATION) at 08:29

## 2025-04-26 RX ADMIN — TORSEMIDE 100 MG: 100 TABLET ORAL at 21:35

## 2025-04-26 RX ADMIN — Medication 10 ML: at 08:36

## 2025-04-26 RX ADMIN — TORSEMIDE 100 MG: 100 TABLET ORAL at 08:35

## 2025-04-26 RX ADMIN — ACETAMINOPHEN 650 MG: 325 TABLET ORAL at 21:35

## 2025-04-26 RX ADMIN — IPRATROPIUM BROMIDE AND ALBUTEROL SULFATE 1 DOSE: 2.5; .5 SOLUTION RESPIRATORY (INHALATION) at 19:30

## 2025-04-26 RX ADMIN — IPRATROPIUM BROMIDE AND ALBUTEROL SULFATE 1 DOSE: 2.5; .5 SOLUTION RESPIRATORY (INHALATION) at 08:29

## 2025-04-26 RX ADMIN — EMPAGLIFLOZIN 10 MG: 10 TABLET, FILM COATED ORAL at 08:35

## 2025-04-26 RX ADMIN — ARFORMOTEROL TARTRATE: 15 SOLUTION RESPIRATORY (INHALATION) at 19:30

## 2025-04-26 RX ADMIN — IPRATROPIUM BROMIDE AND ALBUTEROL SULFATE 1 DOSE: 2.5; .5 SOLUTION RESPIRATORY (INHALATION) at 15:10

## 2025-04-26 RX ADMIN — IPRATROPIUM BROMIDE AND ALBUTEROL SULFATE 1 DOSE: 2.5; .5 SOLUTION RESPIRATORY (INHALATION) at 11:21

## 2025-04-26 RX ADMIN — SACUBITRIL AND VALSARTAN 1 TABLET: 49; 51 TABLET, FILM COATED ORAL at 08:35

## 2025-04-26 RX ADMIN — LIDOCAINE 4%: 4 CREAM TOPICAL at 19:22

## 2025-04-26 RX ADMIN — SACUBITRIL AND VALSARTAN 1 TABLET: 49; 51 TABLET, FILM COATED ORAL at 21:35

## 2025-04-26 RX ADMIN — Medication 10 ML: at 21:35

## 2025-04-26 RX ADMIN — SPIRONOLACTONE 25 MG: 25 TABLET ORAL at 08:35

## 2025-04-26 ASSESSMENT — PAIN SCALES - GENERAL
PAINLEVEL_OUTOF10: 3
PAINLEVEL_OUTOF10: 7

## 2025-04-26 ASSESSMENT — PAIN DESCRIPTION - LOCATION: LOCATION: OTHER (COMMENT)

## 2025-04-26 ASSESSMENT — PAIN DESCRIPTION - ORIENTATION: ORIENTATION: RIGHT;LEFT

## 2025-04-26 ASSESSMENT — PAIN DESCRIPTION - DESCRIPTORS: DESCRIPTORS: ACHING

## 2025-04-26 NOTE — ED TRIAGE NOTES
Pt comes to ED from home with reports of CP, left nipple pain, left shoulder pain, swelling into his neck and down his left arm. Pt reports pacemaker placement about 3 weeks ago. Reports notifying his MD yesterday about the nipple pain.

## 2025-04-26 NOTE — ED PROVIDER NOTES
[facility] EMERGENCY DEPARTMENT  EMERGENCY DEPARTMENT ENCOUNTER      Pt Name: Kermit Paulino  MRN: [id number]  Birthdate 1980  Date of evaluation: 4/25/2025  Provider: Shamar Haynes DO    CHIEF COMPLAINT       Chief Complaint   Patient presents with    Chest Pain         HISTORY OF PRESENT ILLNESS   (Location/Symptom, Timing/Onset, Context/Setting, Quality, Duration, Modifying Factors, Severity)  Note limiting factors.   45 male presents ED for evaluation of chest pain left arm and shoulder pain pectoral pain and swelling in his left arm.  Symptoms have gotten worse over the last couple days, had a pacemaker placement about 3 weeks ago by cardiology, continues to have worsening pain, no fevers, has a history of congestive heart failure denies history of DVT history of nonischemic cardiomyopathy which is why his pacemaker was placed.            Review of External Medical Records:     Nursing Notes were reviewed.    REVIEW OF SYSTEMS    (2-9 systems for level 4, 10 or more for level 5)     Review of Systems   Respiratory:  Positive for shortness of breath.    Cardiovascular:  Positive for chest pain.   Gastrointestinal:  Negative for abdominal pain.       Except as noted above the remainder of the review of systems was reviewed and negative.       PAST MEDICAL HISTORY     Past Medical History:   Diagnosis Date    Asthma     CHF (congestive heart failure) (HCC)     Gastrointestinal disorder     ulcers    Hypertension          SURGICAL HISTORY       Past Surgical History:   Procedure Laterality Date    COLONOSCOPY N/A 11/2/2022    COLONOSCOPY performed by Alfonso Beck MD at [facility] ENDOSCOPY    ORTHOPEDIC SURGERY      R hand repair         CURRENT MEDICATIONS       Previous Medications    ALBUTEROL SULFATE HFA (PROVENTIL;VENTOLIN;PROAIR) 108 (90 BASE) MCG/ACT INHALER    Inhale 2 puffs into the lungs every 6 hours as needed    CARVEDILOL (COREG) 3.125 MG TABLET    Take 3.125 mg by mouth 2 times daily

## 2025-04-26 NOTE — H&P
History and Physical    Date of Service:  4/26/2025  Primary Care Provider: Annia Love APRN - NP  Source of information: patient, electronic medical record    Chief Complaint: Chest Pain      History of Presenting Illness:   Kermit Paulino is a 45 y.o. male with apmhx HFrEF 2/2 NICM (EF 10%), htn, COPD, pulm htn, CKD, KELSEY, HF, PuD, and asthma who presents with left arm and shoulder pain.  Associated sx include swelling in the left upper extremity.  His sx have worsened over the past several days. He is s/p ICD placement three weeks ago.      In the ED, VSS.  Labs showed glucose 184, and CXR with no acute cardiopulmonary process.       REVIEW OF SYSTEMS:  A comprehensive review of systems was negative except for that written in the History of Present Illness.     Past Medical History:   Diagnosis Date    Asthma     CHF (congestive heart failure) (HCC)     Gastrointestinal disorder     ulcers    Hypertension       Past Surgical History:   Procedure Laterality Date    COLONOSCOPY N/A 11/2/2022    COLONOSCOPY performed by Alfonso Beck MD at [facility] ENDOSCOPY    ORTHOPEDIC SURGERY      R hand repair     Prior to Admission medications    Medication Sig Start Date End Date Taking? Authorizing Provider   spironolactone (ALDACTONE) 25 MG tablet Take 1 tablet by mouth daily 4/18/24   Greg Harry MD   sacubitril-valsartan (ENTRESTO) 49-51 MG per tablet Take 1 tablet by mouth 2 times daily 4/17/24   Greg Harry MD   albuterol sulfate HFA (PROVENTIL;VENTOLIN;PROAIR) 108 (90 Base) MCG/ACT inhaler Inhale 2 puffs into the lungs every 6 hours as needed 12/31/16   Automatic Reconciliation, Ar   carvedilol (COREG) 3.125 MG tablet Take 3.125 mg by mouth 2 times daily 4/25/22   Automatic Reconciliation, Ar   dapagliflozin (FARXIGA) 5 MG tablet Take 5 mg by mouth daily 6/18/22   Automatic Reconciliation, Ar   fluticasone (FLONASE) 50 MCG/ACT nasal spray 2 sprays by Nasal route daily as needed

## 2025-04-27 VITALS
DIASTOLIC BLOOD PRESSURE: 77 MMHG | OXYGEN SATURATION: 97 % | SYSTOLIC BLOOD PRESSURE: 124 MMHG | TEMPERATURE: 97.8 F | HEART RATE: 71 BPM | WEIGHT: 315 LBS | RESPIRATION RATE: 20 BRPM | BODY MASS INDEX: 44.1 KG/M2 | HEIGHT: 71 IN

## 2025-04-27 LAB
ANION GAP SERPL CALC-SCNC: 7 MMOL/L (ref 2–12)
BASOPHILS # BLD: 0 K/UL (ref 0–0.1)
BASOPHILS NFR BLD: 0 % (ref 0–1)
BUN SERPL-MCNC: 14 MG/DL (ref 6–20)
BUN/CREAT SERPL: 11 (ref 12–20)
CALCIUM SERPL-MCNC: 8.3 MG/DL (ref 8.5–10.1)
CHLORIDE SERPL-SCNC: 99 MMOL/L (ref 97–108)
CO2 SERPL-SCNC: 30 MMOL/L (ref 21–32)
CREAT SERPL-MCNC: 1.22 MG/DL (ref 0.7–1.3)
DIFFERENTIAL METHOD BLD: ABNORMAL
EOSINOPHIL # BLD: 0.25 K/UL (ref 0–0.4)
EOSINOPHIL NFR BLD: 3 % (ref 0–7)
ERYTHROCYTE [DISTWIDTH] IN BLOOD BY AUTOMATED COUNT: 15.4 % (ref 11.5–14.5)
GLUCOSE SERPL-MCNC: 190 MG/DL (ref 65–100)
HCT VFR BLD AUTO: 40 % (ref 36.6–50.3)
HGB BLD-MCNC: 12.1 G/DL (ref 12.1–17)
IMM GRANULOCYTES # BLD AUTO: 0 K/UL
IMM GRANULOCYTES NFR BLD AUTO: 0 %
LYMPHOCYTES # BLD: 1.66 K/UL (ref 0.8–3.5)
LYMPHOCYTES NFR BLD: 20 % (ref 12–49)
MCH RBC QN AUTO: 27.8 PG (ref 26–34)
MCHC RBC AUTO-ENTMCNC: 30.3 G/DL (ref 30–36.5)
MCV RBC AUTO: 92 FL (ref 80–99)
MONOCYTES # BLD: 0.33 K/UL (ref 0–1)
MONOCYTES NFR BLD: 4 % (ref 5–13)
NEUTS SEG # BLD: 6.06 K/UL (ref 1.8–8)
NEUTS SEG NFR BLD: 73 % (ref 32–75)
NRBC # BLD: 0 K/UL (ref 0–0.01)
NRBC BLD-RTO: 0 PER 100 WBC
PLATELET # BLD AUTO: 223 K/UL (ref 150–400)
PMV BLD AUTO: 10.6 FL (ref 8.9–12.9)
POTASSIUM SERPL-SCNC: 3.9 MMOL/L (ref 3.5–5.1)
RBC # BLD AUTO: 4.35 M/UL (ref 4.1–5.7)
RBC MORPH BLD: ABNORMAL
SODIUM SERPL-SCNC: 136 MMOL/L (ref 136–145)
WBC # BLD AUTO: 8.3 K/UL (ref 4.1–11.1)

## 2025-04-27 PROCEDURE — 94640 AIRWAY INHALATION TREATMENT: CPT

## 2025-04-27 PROCEDURE — G0378 HOSPITAL OBSERVATION PER HR: HCPCS

## 2025-04-27 PROCEDURE — 6360000002 HC RX W HCPCS: Performed by: FAMILY MEDICINE

## 2025-04-27 PROCEDURE — 6370000000 HC RX 637 (ALT 250 FOR IP): Performed by: FAMILY MEDICINE

## 2025-04-27 PROCEDURE — 85025 COMPLETE CBC W/AUTO DIFF WBC: CPT

## 2025-04-27 PROCEDURE — 2700000000 HC OXYGEN THERAPY PER DAY

## 2025-04-27 PROCEDURE — 80048 BASIC METABOLIC PNL TOTAL CA: CPT

## 2025-04-27 PROCEDURE — 2500000003 HC RX 250 WO HCPCS: Performed by: FAMILY MEDICINE

## 2025-04-27 RX ORDER — ENOXAPARIN SODIUM 100 MG/ML
30 INJECTION SUBCUTANEOUS 2 TIMES DAILY
Status: DISCONTINUED | OUTPATIENT
Start: 2025-04-27 | End: 2025-04-27 | Stop reason: HOSPADM

## 2025-04-27 RX ADMIN — Medication 10 ML: at 08:34

## 2025-04-27 RX ADMIN — IPRATROPIUM BROMIDE AND ALBUTEROL SULFATE 1 DOSE: 2.5; .5 SOLUTION RESPIRATORY (INHALATION) at 12:25

## 2025-04-27 RX ADMIN — TORSEMIDE 100 MG: 100 TABLET ORAL at 08:31

## 2025-04-27 RX ADMIN — SACUBITRIL AND VALSARTAN 1 TABLET: 49; 51 TABLET, FILM COATED ORAL at 08:32

## 2025-04-27 RX ADMIN — IPRATROPIUM BROMIDE AND ALBUTEROL SULFATE 1 DOSE: 2.5; .5 SOLUTION RESPIRATORY (INHALATION) at 07:52

## 2025-04-27 RX ADMIN — EMPAGLIFLOZIN 10 MG: 10 TABLET, FILM COATED ORAL at 08:32

## 2025-04-27 RX ADMIN — SPIRONOLACTONE 25 MG: 25 TABLET ORAL at 08:32

## 2025-04-27 RX ADMIN — ARFORMOTEROL TARTRATE: 15 SOLUTION RESPIRATORY (INHALATION) at 07:52

## 2025-04-27 NOTE — PROGRESS NOTES
Patient was up to bathroom all night. Large urine output. Patient was also requesting water all night. Educated patient on the importance of fluid restriction to avoid overload. Patient verbalized understanding but stated that he feels sick/nauseous if he doesn't have water to drink and continued to request fluids.

## 2025-04-27 NOTE — CARE COORDINATION
Transition of Care Plan  RUR N/A  Observation      Medicare Outpatient Observation Notice (MOON)/ [place] Outpatient Observation Notice (VOON) provided to patient/representative with verbal explanation of the notice. Time allotted for questions regarding the notice.  Patient /representative provided a completed copy of the MOON/VOON notice.  Copy placed on bedside chart.     CM med tiw with patient in his room-- he was alert and oriented-- Being discharged today.  Confirmed demographics, PCP and insurance-- Self care and independent   Family transporting patient home this afternoon.     Patient has no transition of care needs at this time.  Will follow up with PCP       YESI MEADW

## 2025-04-27 NOTE — PROGRESS NOTES
Lovenox Monitoring / Dosing Update  Indication: DVT Prophylaxis  Recent Labs     04/25/25  2359 04/27/25  0307   HGB 11.9* 12.1    223     Current Weight: 147.9 kg  Est. CrCl = >100 ml/min  Current Dose: 40 mg subcutaneously every 12 hours.  Plan: Change to 30 mg every 12 hours per [facility] P&T Committee Protocol with respect to patient weight.  Pharmacy will continue to monitor patient daily and will make dosage adjustments as needed.

## 2025-04-27 NOTE — DISCHARGE INSTRUCTIONS
Take your diuretics and other medications as prescribed.  Keep nipples covered in vaseline and bandage/gauze.  If pain persists, f/u with PCP for ultrasound. Cessation of spironolactone can also be considered.

## 2025-04-27 NOTE — DISCHARGE SUMMARY
Discharge Summary       PATIENT ID: Kermit Paulino  MRN: [id number]   YOB: 1980    DATE OF ADMISSION: 4/25/2025 11:39 PM    DATE OF DISCHARGE: 4/27/2025   PRIMARY CARE PROVIDER: Annia Love APRN - NP     ATTENDING PHYSICIAN: Oc  DISCHARGING PROVIDER: Carmelita Renae MD    To contact this individual call [phone] and ask the  to page.  If unavailable ask to be transferred the Adult Hospitalist Department.    CONSULTATIONS: IP CONSULT TO HOSPITALIST    PROCEDURES/SURGERIES: * No surgery found *     ADMITTING DIAGNOSES & HOSPITAL COURSE:   45 y.o. male with apmhx HFrEF 2/2 NICM (EF 10%), htn, COPD, pulm htn, CKD, KELSEY, HF, PuD, and asthma who presents with left arm and shoulder pain.  Associated sx include swelling in the left upper extremity.  His sx have worsened over the past several days. He is s/p ICD placement three weeks ago.       In the ED, VSS.  Labs showed glucose 184, and CXR with no acute cardiopulmonary process.      -home diuretics resumed and his asymmetric edema rapidly improved  -LUE doppler negative for DVT  -c/o nipple pain, exam showed superficial fissuring, wound care improved sx  -counseled on the importance of med compliance; was not taking diuretics consistently  -if nipple pain persists, consider ultrasound, if normal, consider spironolactone cessation    DISCHARGE DIAGNOSES / PLAN:        FOLLOW UP APPOINTMENTS:    Follow-up Information       Follow up With Specialties Details Why Contact Info    Annia Love APRN - NP Internal Medicine, Nurse Practitioner, Family Follow up  [address] [facility] [address] [phone]              DISCHARGE MEDICATIONS:     Medication List        CONTINUE taking these medications      albuterol sulfate  (90 Base) MCG/ACT inhaler  Commonly known as: PROVENTIL;VENTOLIN;PROAIR     carvedilol 3.125 MG tablet  Commonly known as: COREG     Entresto 49-51 MG per tablet  Generic drug:

## 2025-04-28 LAB — ECHO BSA: 2.77 M2
